# Patient Record
Sex: FEMALE | Race: WHITE | NOT HISPANIC OR LATINO
[De-identification: names, ages, dates, MRNs, and addresses within clinical notes are randomized per-mention and may not be internally consistent; named-entity substitution may affect disease eponyms.]

---

## 2017-01-31 ENCOUNTER — APPOINTMENT (OUTPATIENT)
Dept: CT IMAGING | Facility: CLINIC | Age: 72
End: 2017-01-31

## 2017-01-31 ENCOUNTER — APPOINTMENT (OUTPATIENT)
Dept: RADIOLOGY | Facility: CLINIC | Age: 72
End: 2017-01-31

## 2017-01-31 ENCOUNTER — OUTPATIENT (OUTPATIENT)
Dept: OUTPATIENT SERVICES | Facility: HOSPITAL | Age: 72
LOS: 1 days | End: 2017-01-31
Payer: MEDICARE

## 2017-01-31 DIAGNOSIS — R63.4 ABNORMAL WEIGHT LOSS: ICD-10-CM

## 2017-01-31 DIAGNOSIS — Z00.8 ENCOUNTER FOR OTHER GENERAL EXAMINATION: ICD-10-CM

## 2017-01-31 DIAGNOSIS — M54.42 LUMBAGO WITH SCIATICA, LEFT SIDE: ICD-10-CM

## 2017-01-31 PROBLEM — Z00.00 ENCOUNTER FOR PREVENTIVE HEALTH EXAMINATION: Status: ACTIVE | Noted: 2017-01-31

## 2017-01-31 PROCEDURE — 72110 X-RAY EXAM L-2 SPINE 4/>VWS: CPT

## 2017-01-31 PROCEDURE — 72170 X-RAY EXAM OF PELVIS: CPT

## 2017-01-31 PROCEDURE — 74176 CT ABD & PELVIS W/O CONTRAST: CPT

## 2018-05-20 ENCOUNTER — INPATIENT (INPATIENT)
Facility: HOSPITAL | Age: 73
LOS: 19 days | DRG: 871 | End: 2018-06-09
Attending: INTERNAL MEDICINE | Admitting: HOSPITALIST
Payer: MEDICARE

## 2018-05-20 VITALS
RESPIRATION RATE: 18 BRPM | SYSTOLIC BLOOD PRESSURE: 69 MMHG | HEART RATE: 144 BPM | DIASTOLIC BLOOD PRESSURE: 56 MMHG | OXYGEN SATURATION: 96 %

## 2018-05-20 LAB
ALBUMIN SERPL ELPH-MCNC: 1.5 G/DL — LOW (ref 3.3–5)
ALP SERPL-CCNC: 82 U/L — SIGNIFICANT CHANGE UP (ref 40–120)
ALT FLD-CCNC: 7 U/L — LOW (ref 10–45)
ANION GAP SERPL CALC-SCNC: 8 MMOL/L — SIGNIFICANT CHANGE UP (ref 5–17)
APTT BLD: 37.1 SEC — SIGNIFICANT CHANGE UP (ref 27.5–37.4)
AST SERPL-CCNC: 13 U/L — SIGNIFICANT CHANGE UP (ref 10–40)
BASOPHILS # BLD AUTO: 0 K/UL — SIGNIFICANT CHANGE UP (ref 0–0.2)
BASOPHILS NFR BLD AUTO: 0 % — SIGNIFICANT CHANGE UP (ref 0–2)
BILIRUB SERPL-MCNC: 0.3 MG/DL — SIGNIFICANT CHANGE UP (ref 0.2–1.2)
BLD GP AB SCN SERPL QL: NEGATIVE — SIGNIFICANT CHANGE UP
BUN SERPL-MCNC: 28 MG/DL — HIGH (ref 7–23)
CALCIUM SERPL-MCNC: 7.2 MG/DL — LOW (ref 8.4–10.5)
CHLORIDE SERPL-SCNC: 103 MMOL/L — SIGNIFICANT CHANGE UP (ref 96–108)
CO2 SERPL-SCNC: 25 MMOL/L — SIGNIFICANT CHANGE UP (ref 22–31)
CREAT SERPL-MCNC: 0.71 MG/DL — SIGNIFICANT CHANGE UP (ref 0.5–1.3)
EOSINOPHIL # BLD AUTO: 0.1 K/UL — SIGNIFICANT CHANGE UP (ref 0–0.5)
EOSINOPHIL NFR BLD AUTO: 0 % — SIGNIFICANT CHANGE UP (ref 0–6)
GAS PNL BLDV: SIGNIFICANT CHANGE UP
GLUCOSE SERPL-MCNC: 102 MG/DL — HIGH (ref 70–99)
HCT VFR BLD CALC: 20.7 % — CRITICAL LOW (ref 34.5–45)
HGB BLD-MCNC: 6.5 G/DL — CRITICAL LOW (ref 11.5–15.5)
INR BLD: 1.45 RATIO — HIGH (ref 0.88–1.16)
LYMPHOCYTES # BLD AUTO: 2.5 K/UL — SIGNIFICANT CHANGE UP (ref 1–3.3)
LYMPHOCYTES # BLD AUTO: 6 % — LOW (ref 13–44)
MCHC RBC-ENTMCNC: 28.9 PG — SIGNIFICANT CHANGE UP (ref 27–34)
MCHC RBC-ENTMCNC: 31.3 GM/DL — LOW (ref 32–36)
MCV RBC AUTO: 92.4 FL — SIGNIFICANT CHANGE UP (ref 80–100)
MONOCYTES # BLD AUTO: 1.2 K/UL — HIGH (ref 0–0.9)
MONOCYTES NFR BLD AUTO: 3 % — SIGNIFICANT CHANGE UP (ref 2–14)
NEUTROPHILS # BLD AUTO: 24.1 K/UL — HIGH (ref 1.8–7.4)
NEUTROPHILS NFR BLD AUTO: 89 % — HIGH (ref 43–77)
PLATELET # BLD AUTO: 363 K/UL — SIGNIFICANT CHANGE UP (ref 150–400)
POTASSIUM SERPL-MCNC: 4.5 MMOL/L — SIGNIFICANT CHANGE UP (ref 3.5–5.3)
POTASSIUM SERPL-SCNC: 4.5 MMOL/L — SIGNIFICANT CHANGE UP (ref 3.5–5.3)
PROT SERPL-MCNC: 5 G/DL — LOW (ref 6–8.3)
PROTHROM AB SERPL-ACNC: 15.9 SEC — HIGH (ref 9.8–12.7)
RBC # BLD: 2.24 M/UL — LOW (ref 3.8–5.2)
RBC # FLD: 21.7 % — HIGH (ref 10.3–14.5)
RH IG SCN BLD-IMP: POSITIVE — SIGNIFICANT CHANGE UP
RH IG SCN BLD-IMP: POSITIVE — SIGNIFICANT CHANGE UP
SODIUM SERPL-SCNC: 136 MMOL/L — SIGNIFICANT CHANGE UP (ref 135–145)
WBC # BLD: 27.9 K/UL — HIGH (ref 3.8–10.5)
WBC # FLD AUTO: 27.9 K/UL — HIGH (ref 3.8–10.5)

## 2018-05-20 PROCEDURE — 99223 1ST HOSP IP/OBS HIGH 75: CPT | Mod: AI,GC

## 2018-05-20 PROCEDURE — 74177 CT ABD & PELVIS W/CONTRAST: CPT | Mod: 26

## 2018-05-20 PROCEDURE — 71260 CT THORAX DX C+: CPT | Mod: 26

## 2018-05-20 PROCEDURE — 71045 X-RAY EXAM CHEST 1 VIEW: CPT | Mod: 26

## 2018-05-20 PROCEDURE — 99291 CRITICAL CARE FIRST HOUR: CPT | Mod: GC

## 2018-05-20 RX ORDER — SODIUM CHLORIDE 9 MG/ML
1000 INJECTION, SOLUTION INTRAVENOUS ONCE
Qty: 0 | Refills: 0 | Status: COMPLETED | OUTPATIENT
Start: 2018-05-20 | End: 2018-05-20

## 2018-05-20 RX ORDER — PIPERACILLIN AND TAZOBACTAM 4; .5 G/20ML; G/20ML
3.38 INJECTION, POWDER, LYOPHILIZED, FOR SOLUTION INTRAVENOUS ONCE
Qty: 0 | Refills: 0 | Status: COMPLETED | OUTPATIENT
Start: 2018-05-20 | End: 2018-05-21

## 2018-05-20 RX ORDER — OXYCODONE HYDROCHLORIDE 5 MG/1
5 TABLET ORAL ONCE
Qty: 0 | Refills: 0 | Status: DISCONTINUED | OUTPATIENT
Start: 2018-05-20 | End: 2018-05-20

## 2018-05-20 RX ORDER — SODIUM CHLORIDE 9 MG/ML
1000 INJECTION INTRAMUSCULAR; INTRAVENOUS; SUBCUTANEOUS ONCE
Qty: 0 | Refills: 0 | Status: DISCONTINUED | OUTPATIENT
Start: 2018-05-20 | End: 2018-05-20

## 2018-05-20 RX ORDER — VANCOMYCIN HCL 1 G
1000 VIAL (EA) INTRAVENOUS ONCE
Qty: 0 | Refills: 0 | Status: COMPLETED | OUTPATIENT
Start: 2018-05-20 | End: 2018-05-21

## 2018-05-20 RX ADMIN — OXYCODONE HYDROCHLORIDE 5 MILLIGRAM(S): 5 TABLET ORAL at 18:09

## 2018-05-20 RX ADMIN — SODIUM CHLORIDE 1000 MILLILITER(S): 9 INJECTION, SOLUTION INTRAVENOUS at 17:01

## 2018-05-20 RX ADMIN — OXYCODONE HYDROCHLORIDE 5 MILLIGRAM(S): 5 TABLET ORAL at 20:26

## 2018-05-20 NOTE — ED PROVIDER NOTE - MEDICAL DECISION MAKING DETAILS
MING Butts MD: 74 y/o female with metastatic sarcoma, AFib not on AC is BIBA for generalized weakness, abd pain, found to be hypotensive and tachycardic. Low H/H on recent labs. Plan: IV hydration, infectious and metabolic w/u, transfuse if H/H low, likely admission for further w/u

## 2018-05-20 NOTE — ED PROVIDER NOTE - CRITICAL CARE PROVIDED
direct patient care (not related to procedure)/documentation/telephone consultation with the patient's family/additional history taking/interpretation of diagnostic studies/consult w/ pt's family directly relating to pts condition

## 2018-05-20 NOTE — ED PROVIDER NOTE - OBJECTIVE STATEMENT
Pt is a 74 y/o female with PMH AFib (not on AC), metastatic sarcoma who is BIBA for weakness and abd pain. Per patient, she has chronic abd pain for which she takes oxycodone, but did not take any today. She has had reduced PO intake 2/2 pain everytime she eats and diarrhea. Per son (physician), pt was on chemo until 2 yrs ago when it was d/c-ed because it wasn't helping. Pt receives all her care at [facility]. Her Oncologist is Dr. Argelia Smith. Her Hgb was <8 on labs done last week but no blood was given at that time. She has had off/on BRBPR, which was thought to be 2/2 "gastritis," per son she had an EGD 1-2 months ago. Per EMS, SBP was 60's systolic on arrival and -140's. Per son, she has been holding her lopressor (for AFib) when her BP is low. Last BRBPR was 1 week ago per patient. Denies f/c, n/v/d, HA, dizziness, focal numbness/weakness.  Son: Reymundo Rivera (hospitalist at [facility]) [phone] Pt is a 72 y/o female with PMH AFib (not on AC), metastatic sarcoma who is BIBA for weakness and abd pain. Per patient, she has chronic abd pain for which she takes oxycodone, but did not take any today. She has had reduced PO intake 2/2 pain everytime she eats and diarrhea. Per son (physician), pt was on chemo until 2 yrs ago when it was d/c-ed because it wasn't helping. Pt receives all her care at [facility]. Her Oncologist is Dr. Argelia Smith. Her Hgb was <8 on labs done last week but no blood was given at that time. She has had off/on BRBPR, which was thought to be 2/2 "gastritis," per son she had an EGD 1-2 months ago. Per EMS, SBP was 60's systolic on arrival and -140's. Per son, she has been holding her lopressor (for AFib) when her BP is low. Last BRBPR was 1 week ago per patient. Denies f/c, n/v/d, HA, dizziness, focal numbness/weakness.  Son: Reymundo Rivera (hospitalist at [facility]) [phone] Pt is a 72 y/o female with PMH AFib (not on AC), metastatic sarcoma who is BIBA for weakness and abd pain. Per patient, she has chronic abd pain for which she takes oxycodone, but did not take any today. She has had reduced PO intake 2/2 pain everytime she eats and diarrhea. Per son (physician), pt was on chemo until 2 yrs ago when it was d/c-ed because it wasn't helping. Pt receives all her care at [facility]. Her Oncologist is Dr. Argelia Smith. Her Hgb was <8 on labs done last week but no blood was given at that time. She has had off/on BRBPR, which was thought to be 2/2 "gastritis," per son she had an EGD 1-2 months ago. Per EMS, SBP was 60's systolic on arrival and -140's. Per son, she has been holding her lopressor (for AFib) when her BP is low. Last BRBPR was 1 week ago per patient. Denies f/c, n/v/d, HA, dizziness, focal numbness/weakness.     Add' hx: Pt had a pericardial effusion drained a month ago. 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 Last surgery was 1 yr ago (resected her cancer), but it locally recurred. S/p gemcytobine/doxitaxel c/b pneumonitis, followed by liposomal doxirubicin. Later transitioned to a clinical trial immunotherapy drug of zivolumab + another new drug, d/c-ed due to side effects (thyroiditis>hypothyroidism). Had a few recent admissions for GIB (melena from duodenal lesion which improved s/p radiation) while at [facility] - xarelto discontinued. H/o EGD but no colonoscopy done.  Per son, she is usually on short acting metoprolol when BP allows if her BP is on the low-side for her AFib.   Code status: full code for now; pt has a living will however    Son: Reymundo Rivera (hospitalist at [facility]) [phone]. Please call if add'l questions or discussions re: her care

## 2018-05-20 NOTE — ED ADULT NURSE REASSESSMENT NOTE - NS ED NURSE REASSESS COMMENT FT1
#1 PRBC started as per order with 2RNs verifying the pt and blood product, consent in chart, pt is educated for signs and symptoms of adverse reaction.

## 2018-05-20 NOTE — ED ADULT NURSE NOTE - OBJECTIVE STATEMENT
72 yo F arrived to the ed by ambulance hx of stomach ca c/o rectal bleeding/hypotension; pt c/o abd pain; c.m placed, tachycardic; A&Ox4; IV placed 74 yo F arrived to the ed by ambulance hx of stomach ca c/o rectal bleeding/hypotension; pt stopped chemo 2 years ago; reports decreased po intake r/t pain when eating; as per some pt has had intermittent gi bleeding, today c/o weakness, denies any pain on assessment; c.m placed, tachycardic; A&Ox4; IV placed

## 2018-05-20 NOTE — ED PROVIDER NOTE - CARE PLAN
Principal Discharge DX:	Hypotension, unspecified hypotension type  Secondary Diagnosis:	Anemia, unspecified type

## 2018-05-21 DIAGNOSIS — A41.9 SEPSIS, UNSPECIFIED ORGANISM: ICD-10-CM

## 2018-05-21 DIAGNOSIS — E03.9 HYPOTHYROIDISM, UNSPECIFIED: ICD-10-CM

## 2018-05-21 DIAGNOSIS — I95.9 HYPOTENSION, UNSPECIFIED: ICD-10-CM

## 2018-05-21 DIAGNOSIS — Z90.411 ACQUIRED PARTIAL ABSENCE OF PANCREAS: Chronic | ICD-10-CM

## 2018-05-21 DIAGNOSIS — Z90.5 ACQUIRED ABSENCE OF KIDNEY: Chronic | ICD-10-CM

## 2018-05-21 DIAGNOSIS — C49.9 MALIGNANT NEOPLASM OF CONNECTIVE AND SOFT TISSUE, UNSPECIFIED: ICD-10-CM

## 2018-05-21 DIAGNOSIS — Z71.89 OTHER SPECIFIED COUNSELING: ICD-10-CM

## 2018-05-21 DIAGNOSIS — Z29.9 ENCOUNTER FOR PROPHYLACTIC MEASURES, UNSPECIFIED: ICD-10-CM

## 2018-05-21 DIAGNOSIS — I48.91 UNSPECIFIED ATRIAL FIBRILLATION: ICD-10-CM

## 2018-05-21 DIAGNOSIS — D62 ACUTE POSTHEMORRHAGIC ANEMIA: ICD-10-CM

## 2018-05-21 LAB
ALBUMIN SERPL ELPH-MCNC: 1.5 G/DL — LOW (ref 3.3–5)
ALBUMIN SERPL ELPH-MCNC: 1.7 G/DL — LOW (ref 3.3–5)
ALP SERPL-CCNC: 102 U/L — SIGNIFICANT CHANGE UP (ref 40–120)
ALP SERPL-CCNC: 87 U/L — SIGNIFICANT CHANGE UP (ref 40–120)
ALT FLD-CCNC: 5 U/L — LOW (ref 10–45)
ALT FLD-CCNC: <5 U/L — LOW (ref 10–45)
ANION GAP SERPL CALC-SCNC: 10 MMOL/L — SIGNIFICANT CHANGE UP (ref 5–17)
ANION GAP SERPL CALC-SCNC: 11 MMOL/L — SIGNIFICANT CHANGE UP (ref 5–17)
AST SERPL-CCNC: 12 U/L — SIGNIFICANT CHANGE UP (ref 10–40)
AST SERPL-CCNC: 6 U/L — LOW (ref 10–40)
BILIRUB SERPL-MCNC: 0.7 MG/DL — SIGNIFICANT CHANGE UP (ref 0.2–1.2)
BILIRUB SERPL-MCNC: 0.8 MG/DL — SIGNIFICANT CHANGE UP (ref 0.2–1.2)
BUN SERPL-MCNC: 26 MG/DL — HIGH (ref 7–23)
BUN SERPL-MCNC: 26 MG/DL — HIGH (ref 7–23)
CALCIUM SERPL-MCNC: 7.1 MG/DL — LOW (ref 8.4–10.5)
CALCIUM SERPL-MCNC: 7.7 MG/DL — LOW (ref 8.4–10.5)
CHLORIDE SERPL-SCNC: 102 MMOL/L — SIGNIFICANT CHANGE UP (ref 96–108)
CHLORIDE SERPL-SCNC: 104 MMOL/L — SIGNIFICANT CHANGE UP (ref 96–108)
CO2 SERPL-SCNC: 22 MMOL/L — SIGNIFICANT CHANGE UP (ref 22–31)
CO2 SERPL-SCNC: 25 MMOL/L — SIGNIFICANT CHANGE UP (ref 22–31)
CREAT SERPL-MCNC: 0.73 MG/DL — SIGNIFICANT CHANGE UP (ref 0.5–1.3)
CREAT SERPL-MCNC: 0.75 MG/DL — SIGNIFICANT CHANGE UP (ref 0.5–1.3)
FOLATE SERPL-MCNC: 7.4 NG/ML — SIGNIFICANT CHANGE UP
GAS PNL BLDV: SIGNIFICANT CHANGE UP
GLUCOSE BLDC GLUCOMTR-MCNC: 75 MG/DL — SIGNIFICANT CHANGE UP (ref 70–99)
GLUCOSE SERPL-MCNC: 77 MG/DL — SIGNIFICANT CHANGE UP (ref 70–99)
GLUCOSE SERPL-MCNC: 86 MG/DL — SIGNIFICANT CHANGE UP (ref 70–99)
HCT VFR BLD CALC: 30.7 % — LOW (ref 34.5–45)
HCT VFR BLD CALC: 34.8 % — SIGNIFICANT CHANGE UP (ref 34.5–45)
HCT VFR BLD CALC: 38.2 % — SIGNIFICANT CHANGE UP (ref 34.5–45)
HGB BLD-MCNC: 10 G/DL — LOW (ref 11.5–15.5)
HGB BLD-MCNC: 11.3 G/DL — LOW (ref 11.5–15.5)
HGB BLD-MCNC: 12.2 G/DL — SIGNIFICANT CHANGE UP (ref 11.5–15.5)
MAGNESIUM SERPL-MCNC: 1.9 MG/DL — SIGNIFICANT CHANGE UP (ref 1.6–2.6)
MCHC RBC-ENTMCNC: 29.3 PG — SIGNIFICANT CHANGE UP (ref 27–34)
MCHC RBC-ENTMCNC: 29.6 PG — SIGNIFICANT CHANGE UP (ref 27–34)
MCHC RBC-ENTMCNC: 29.8 PG — SIGNIFICANT CHANGE UP (ref 27–34)
MCHC RBC-ENTMCNC: 31.8 GM/DL — LOW (ref 32–36)
MCHC RBC-ENTMCNC: 32.5 GM/DL — SIGNIFICANT CHANGE UP (ref 32–36)
MCHC RBC-ENTMCNC: 32.6 GM/DL — SIGNIFICANT CHANGE UP (ref 32–36)
MCV RBC AUTO: 91 FL — SIGNIFICANT CHANGE UP (ref 80–100)
MCV RBC AUTO: 91.6 FL — SIGNIFICANT CHANGE UP (ref 80–100)
MCV RBC AUTO: 92.1 FL — SIGNIFICANT CHANGE UP (ref 80–100)
PHOSPHATE SERPL-MCNC: 2.9 MG/DL — SIGNIFICANT CHANGE UP (ref 2.5–4.5)
PLATELET # BLD AUTO: 293 K/UL — SIGNIFICANT CHANGE UP (ref 150–400)
PLATELET # BLD AUTO: 303 K/UL — SIGNIFICANT CHANGE UP (ref 150–400)
PLATELET # BLD AUTO: 360 K/UL — SIGNIFICANT CHANGE UP (ref 150–400)
POTASSIUM SERPL-MCNC: 4.5 MMOL/L — SIGNIFICANT CHANGE UP (ref 3.5–5.3)
POTASSIUM SERPL-MCNC: 4.5 MMOL/L — SIGNIFICANT CHANGE UP (ref 3.5–5.3)
POTASSIUM SERPL-SCNC: 4.5 MMOL/L — SIGNIFICANT CHANGE UP (ref 3.5–5.3)
POTASSIUM SERPL-SCNC: 4.5 MMOL/L — SIGNIFICANT CHANGE UP (ref 3.5–5.3)
PROT SERPL-MCNC: 5.1 G/DL — LOW (ref 6–8.3)
PROT SERPL-MCNC: 5.4 G/DL — LOW (ref 6–8.3)
RBC # BLD: 3.37 M/UL — LOW (ref 3.8–5.2)
RBC # BLD: 3.8 M/UL — SIGNIFICANT CHANGE UP (ref 3.8–5.2)
RBC # BLD: 4.15 M/UL — SIGNIFICANT CHANGE UP (ref 3.8–5.2)
RBC # FLD: 18 % — HIGH (ref 10.3–14.5)
RBC # FLD: 18.7 % — HIGH (ref 10.3–14.5)
RBC # FLD: 18.9 % — HIGH (ref 10.3–14.5)
SODIUM SERPL-SCNC: 137 MMOL/L — SIGNIFICANT CHANGE UP (ref 135–145)
SODIUM SERPL-SCNC: 137 MMOL/L — SIGNIFICANT CHANGE UP (ref 135–145)
T3 SERPL-MCNC: 37 NG/DL — LOW (ref 80–200)
T4 FREE SERPL-MCNC: 0.4 NG/DL — LOW (ref 0.9–1.8)
TSH SERPL-MCNC: 31.87 UIU/ML — HIGH (ref 0.27–4.2)
VIT B12 SERPL-MCNC: 1692 PG/ML — HIGH (ref 232–1245)
WBC # BLD: 22.4 K/UL — HIGH (ref 3.8–10.5)
WBC # BLD: 25.3 K/UL — HIGH (ref 3.8–10.5)
WBC # BLD: 26.9 K/UL — HIGH (ref 3.8–10.5)
WBC # FLD AUTO: 22.4 K/UL — HIGH (ref 3.8–10.5)
WBC # FLD AUTO: 25.3 K/UL — HIGH (ref 3.8–10.5)
WBC # FLD AUTO: 26.9 K/UL — HIGH (ref 3.8–10.5)

## 2018-05-21 PROCEDURE — 99233 SBSQ HOSP IP/OBS HIGH 50: CPT | Mod: GC

## 2018-05-21 PROCEDURE — 99222 1ST HOSP IP/OBS MODERATE 55: CPT

## 2018-05-21 RX ORDER — OXYCODONE HYDROCHLORIDE 5 MG/1
5 TABLET ORAL EVERY 6 HOURS
Qty: 0 | Refills: 0 | Status: DISCONTINUED | OUTPATIENT
Start: 2018-05-21 | End: 2018-05-21

## 2018-05-21 RX ORDER — OXYCODONE HYDROCHLORIDE 5 MG/1
5 TABLET ORAL EVERY 6 HOURS
Qty: 0 | Refills: 0 | Status: DISCONTINUED | OUTPATIENT
Start: 2018-05-21 | End: 2018-05-22

## 2018-05-21 RX ORDER — SODIUM CHLORIDE 9 MG/ML
500 INJECTION INTRAMUSCULAR; INTRAVENOUS; SUBCUTANEOUS ONCE
Qty: 0 | Refills: 0 | Status: COMPLETED | OUTPATIENT
Start: 2018-05-21 | End: 2018-05-21

## 2018-05-21 RX ORDER — DIGOXIN 250 MCG
0.25 TABLET ORAL EVERY 8 HOURS
Qty: 0 | Refills: 0 | Status: COMPLETED | OUTPATIENT
Start: 2018-05-21 | End: 2018-05-22

## 2018-05-21 RX ORDER — METOPROLOL TARTRATE 50 MG
25 TABLET ORAL
Qty: 0 | Refills: 0 | Status: DISCONTINUED | OUTPATIENT
Start: 2018-05-21 | End: 2018-06-03

## 2018-05-21 RX ORDER — SODIUM CHLORIDE 9 MG/ML
1000 INJECTION INTRAMUSCULAR; INTRAVENOUS; SUBCUTANEOUS
Qty: 0 | Refills: 0 | Status: COMPLETED | OUTPATIENT
Start: 2018-05-21 | End: 2018-05-22

## 2018-05-21 RX ORDER — SODIUM CHLORIDE 9 MG/ML
1000 INJECTION INTRAMUSCULAR; INTRAVENOUS; SUBCUTANEOUS ONCE
Qty: 0 | Refills: 0 | Status: DISCONTINUED | OUTPATIENT
Start: 2018-05-21 | End: 2018-05-21

## 2018-05-21 RX ORDER — SODIUM CHLORIDE 9 MG/ML
1000 INJECTION INTRAMUSCULAR; INTRAVENOUS; SUBCUTANEOUS ONCE
Qty: 0 | Refills: 0 | Status: COMPLETED | OUTPATIENT
Start: 2018-05-21 | End: 2018-05-21

## 2018-05-21 RX ORDER — LEVOTHYROXINE SODIUM 125 MCG
75 TABLET ORAL DAILY
Qty: 0 | Refills: 0 | Status: DISCONTINUED | OUTPATIENT
Start: 2018-05-21 | End: 2018-05-25

## 2018-05-21 RX ORDER — MIRTAZAPINE 45 MG/1
7.5 TABLET, ORALLY DISINTEGRATING ORAL DAILY
Qty: 0 | Refills: 0 | Status: DISCONTINUED | OUTPATIENT
Start: 2018-05-21 | End: 2018-06-06

## 2018-05-21 RX ORDER — ACETAMINOPHEN 500 MG
1000 TABLET ORAL ONCE
Qty: 0 | Refills: 0 | Status: DISCONTINUED | OUTPATIENT
Start: 2018-05-21 | End: 2018-05-21

## 2018-05-21 RX ORDER — VANCOMYCIN HCL 1 G
1000 VIAL (EA) INTRAVENOUS EVERY 12 HOURS
Qty: 0 | Refills: 0 | Status: DISCONTINUED | OUTPATIENT
Start: 2018-05-21 | End: 2018-05-22

## 2018-05-21 RX ORDER — SODIUM CHLORIDE 9 MG/ML
1000 INJECTION INTRAMUSCULAR; INTRAVENOUS; SUBCUTANEOUS
Qty: 0 | Refills: 0 | Status: DISCONTINUED | OUTPATIENT
Start: 2018-05-21 | End: 2018-05-21

## 2018-05-21 RX ORDER — DIGOXIN 250 MCG
0.5 TABLET ORAL ONCE
Qty: 0 | Refills: 0 | Status: COMPLETED | OUTPATIENT
Start: 2018-05-21 | End: 2018-05-21

## 2018-05-21 RX ORDER — PANTOPRAZOLE SODIUM 20 MG/1
40 TABLET, DELAYED RELEASE ORAL EVERY 12 HOURS
Qty: 0 | Refills: 0 | Status: DISCONTINUED | OUTPATIENT
Start: 2018-05-21 | End: 2018-05-24

## 2018-05-21 RX ORDER — ATORVASTATIN CALCIUM 80 MG/1
40 TABLET, FILM COATED ORAL AT BEDTIME
Qty: 0 | Refills: 0 | Status: DISCONTINUED | OUTPATIENT
Start: 2018-05-21 | End: 2018-06-05

## 2018-05-21 RX ORDER — PIPERACILLIN AND TAZOBACTAM 4; .5 G/20ML; G/20ML
3.38 INJECTION, POWDER, LYOPHILIZED, FOR SOLUTION INTRAVENOUS EVERY 8 HOURS
Qty: 0 | Refills: 0 | Status: DISCONTINUED | OUTPATIENT
Start: 2018-05-21 | End: 2018-05-25

## 2018-05-21 RX ORDER — HYDROMORPHONE HYDROCHLORIDE 2 MG/ML
0.5 INJECTION INTRAMUSCULAR; INTRAVENOUS; SUBCUTANEOUS ONCE
Qty: 0 | Refills: 0 | Status: DISCONTINUED | OUTPATIENT
Start: 2018-05-21 | End: 2018-05-21

## 2018-05-21 RX ORDER — HYDROMORPHONE HYDROCHLORIDE 2 MG/ML
0.5 INJECTION INTRAMUSCULAR; INTRAVENOUS; SUBCUTANEOUS EVERY 4 HOURS
Qty: 0 | Refills: 0 | Status: DISCONTINUED | OUTPATIENT
Start: 2018-05-21 | End: 2018-05-22

## 2018-05-21 RX ADMIN — HYDROMORPHONE HYDROCHLORIDE 0.5 MILLIGRAM(S): 2 INJECTION INTRAMUSCULAR; INTRAVENOUS; SUBCUTANEOUS at 12:56

## 2018-05-21 RX ADMIN — HYDROMORPHONE HYDROCHLORIDE 0.5 MILLIGRAM(S): 2 INJECTION INTRAMUSCULAR; INTRAVENOUS; SUBCUTANEOUS at 13:15

## 2018-05-21 RX ADMIN — PANTOPRAZOLE SODIUM 40 MILLIGRAM(S): 20 TABLET, DELAYED RELEASE ORAL at 18:11

## 2018-05-21 RX ADMIN — OXYCODONE HYDROCHLORIDE 5 MILLIGRAM(S): 5 TABLET ORAL at 21:33

## 2018-05-21 RX ADMIN — OXYCODONE HYDROCHLORIDE 5 MILLIGRAM(S): 5 TABLET ORAL at 20:33

## 2018-05-21 RX ADMIN — ATORVASTATIN CALCIUM 40 MILLIGRAM(S): 80 TABLET, FILM COATED ORAL at 20:33

## 2018-05-21 RX ADMIN — Medication 0.25 MILLIGRAM(S): at 20:21

## 2018-05-21 RX ADMIN — Medication 250 MILLIGRAM(S): at 10:14

## 2018-05-21 RX ADMIN — Medication 0.5 MILLIGRAM(S): at 11:08

## 2018-05-21 RX ADMIN — MIRTAZAPINE 7.5 MILLIGRAM(S): 45 TABLET, ORALLY DISINTEGRATING ORAL at 20:33

## 2018-05-21 RX ADMIN — PIPERACILLIN AND TAZOBACTAM 25 GRAM(S): 4; .5 INJECTION, POWDER, LYOPHILIZED, FOR SOLUTION INTRAVENOUS at 09:02

## 2018-05-21 RX ADMIN — SODIUM CHLORIDE 75 MILLILITER(S): 9 INJECTION INTRAMUSCULAR; INTRAVENOUS; SUBCUTANEOUS at 04:38

## 2018-05-21 RX ADMIN — PANTOPRAZOLE SODIUM 40 MILLIGRAM(S): 20 TABLET, DELAYED RELEASE ORAL at 06:55

## 2018-05-21 RX ADMIN — PIPERACILLIN AND TAZOBACTAM 25 GRAM(S): 4; .5 INJECTION, POWDER, LYOPHILIZED, FOR SOLUTION INTRAVENOUS at 18:11

## 2018-05-21 RX ADMIN — SODIUM CHLORIDE 4000 MILLILITER(S): 9 INJECTION INTRAMUSCULAR; INTRAVENOUS; SUBCUTANEOUS at 11:07

## 2018-05-21 RX ADMIN — Medication 250 MILLIGRAM(S): at 04:34

## 2018-05-21 RX ADMIN — Medication 250 MILLIGRAM(S): at 22:57

## 2018-05-21 RX ADMIN — PIPERACILLIN AND TAZOBACTAM 200 GRAM(S): 4; .5 INJECTION, POWDER, LYOPHILIZED, FOR SOLUTION INTRAVENOUS at 00:53

## 2018-05-21 NOTE — CONSULT NOTE ADULT - ATTENDING COMMENTS
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  Patient with multiple admissions for anemia requiring transfusions over the past weeks.  Patient had previous response to XRT to duodenal lesion after bleeding  All prior care performed at [facility].  Currently no bleeding for 24-48 hours.  Patient responded to 2 units prbc.  Extensive discussion with son (Hospitalist at [facility]) regarding potential of endoscopy for diagnostic purposes, but unclear benefit for intervention (suspect any GI intervention to eroding tumor may not be of benefit).      - Will defer on endoscopic evaluation at this time, re evaluate if rebleeding occurs  - IV PPI twice daily (may be of some benefit for duodenal lesion)  - CBC q12 - transfusion as per primary team  - Liquid diet

## 2018-05-21 NOTE — PROGRESS NOTE ADULT - PROBLEM SELECTOR PLAN 6
Per son, patient takes Metoprolol 50 ER BID, which is an abnormal dose. Will hold MEtoprolol for now in setting of hypotension.  Admit to telemetry.  Check TSH. Per son, patient takes Metoprolol 50 ER BID, which is an abnormal dose. Will hold Metoprolol for now in setting of hypotension.  Admit to telemetry.  Check TSH.

## 2018-05-21 NOTE — PROGRESS NOTE ADULT - PROBLEM SELECTOR PLAN 2
F/u UA, BCx.  LLL PNA on CXR. In setting of hypotension, could be septic shock. Will continue broad spectrum antibiotics.  F/u C. Diff per sons request. Patient not endorsing diarrhea at this time. Leukocytosis 28 on admission, unclear whether reactive 2/2 malignancy vs infectious.   F/u UA, BCx.  LLL PNA on CXR. In setting of hypotension, could be septic shock. Will continue broad spectrum antibiotics.  F/u C. Diff per sons request. Patient not endorsing diarrhea at this time.

## 2018-05-21 NOTE — CONSULT NOTE ADULT - ASSESSMENT
Impression:    1. Anemia/recent hematochezia: unclear if acute active bleeding is the cause of hemodynamic instability vs. infectious process. The ddx for bleeding includes due to the upper GI fistula with the duodenum vs. secondary to the lower GI lesion fistulizing with the duodenum, cannot exclude a coexisting other GI lesion such as a peptic ulcer or angioectasia    2. Locally advanced sarcoma    3. Afib with RVR    4. Leukocytosis: no clear infectious source     5. Intermittent diarrhea: possibly due to lesion invading the colon, rule out infectious enterocolitis (cdiff)    Recommendation:  -stabilization/resuscitation per medical team, consider ICU c/s  -would consider EGD/colonoscopy to identify the underlying cause of bleeding and see if the lesion is amenable to endoscopic therapy however the patient's Son Dr. Reymundo Clark would prefer any endoscopic evaluation be performed at [facility] so that care can be coordinated with the patient's oncology team  -it would be helpful to obtain endoscopy records from [facility]  -continue to clarify goals of care  -if diarrhea recurs check Cdiff/GI PCR Impression:    1. Anemia/recent hematochezia: unclear if acute active bleeding is the cause of hemodynamic instability vs. infectious process. The ddx for bleeding includes due to the upper GI fistula with the duodenum vs. secondary to the lower GI lesion fistulizing with the colon, cannot exclude a coexisting other GI lesion such as a peptic ulcer or angioectasia    2. Locally advanced sarcoma    3. Afib with RVR    4. Leukocytosis: no clear infectious source     5. Intermittent diarrhea: possibly due to lesion invading the colon, rule out infectious enterocolitis (cdiff)    Recommendation:  -stabilization/resuscitation per medical team, consider ICU c/s  -would consider EGD/colonoscopy to identify the underlying cause of bleeding and see if the lesion is amenable to endoscopic therapy pending stabilization, however the patient's Son  Reymundo Collazojames would prefer any endoscopic evaluation be performed at [facility] so that care can be coordinated with the patient's oncology team  -it would be helpful to obtain endoscopy records from [facility]  -continue to clarify goals of care  -if diarrhea recurs check Cdiff/GI PCR

## 2018-05-21 NOTE — PROGRESS NOTE ADULT - SUBJECTIVE AND OBJECTIVE BOX
Internal Medicine Progress Note    Claire De Paz, PGY1  Internal Medicine, team 1  Pager [phone] / [phone]  After 7PM on weekdays and 12PM on weekends, please page #[phone]    Patient is a 73y old  Female who presents with a chief complaint of Lethargy, abd pain (21 May 2018 01:47)      SUBJECTIVE / OVERNIGHT EVENTS:    MEDICATIONS  (STANDING):  atorvastatin 40 milliGRAM(s) Oral at bedtime  levothyroxine 75 MICROGram(s) Oral daily  metoprolol tartrate 25 milliGRAM(s) Oral two times a day  mirtazapine 7.5 milliGRAM(s) Oral daily  pantoprazole  Injectable 40 milliGRAM(s) IV Push every 12 hours  piperacillin/tazobactam IVPB. 3.375 Gram(s) IV Intermittent every 8 hours  sodium chloride 0.9%. 1000 milliLiter(s) (75 mL/Hr) IV Continuous <Continuous>  vancomycin  IVPB 1000 milliGRAM(s) IV Intermittent every 12 hours    MEDICATIONS  (PRN):    Vital Signs Last 24 Hrs  T(C): 36.4 (21 May 2018 07:12), Max: 37.6 (21 May 2018 03:28)  T(F): 97.6 (21 May 2018 07:12), Max: 99.7 (21 May 2018 03:28)  HR: 127 (21 May 2018 07:12) (80 - 145)  BP: 98/63 (21 May 2018 07:12) (69/56 - 135/67)  BP(mean): --  RR: 15 (21 May 2018 07:12) (15 - 22)  SpO2: 100% (21 May 2018 07:12) (95% - 100%)    CAPILLARY BLOOD GLUCOSE      POCT Blood Glucose.: 75 mg/dL (21 May 2018 03:13)    I&O's Summary      PHYSICAL EXAM  GENERAL: NAD, well-developed  HEAD:  Atraumatic, Normocephalic  EYES: EOMI, PERRLA, conjunctiva and sclera clear  NECK: Supple, No JVD  CHEST/LUNG: Clear to auscultation bilaterally; No wheeze  HEART: Regular rate and rhythm; No murmurs, rubs, or gallops  ABDOMEN: Soft, Nontender, Nondistended; Bowel sounds present  EXTREMITIES:  2+ Peripheral Pulses, No clubbing, cyanosis, or edema  NEURO/PSYCH: AAOx3, nonfocal  SKIN: No rashes or lesions      LABS:                        10.0   25.3  )-----------( 293      ( 21 May 2018 04:07 )             30.7     CBC Full  -  ( 21 May 2018 04:07 )  WBC Count : 25.3 K/uL  Hemoglobin : 10.0 g/dL  Hematocrit : 30.7 %  Platelet Count - Automated : 293 K/uL  Mean Cell Volume : 91.0 fl  Mean Cell Hemoglobin : 29.6 pg  Mean Cell Hemoglobin Concentration : 32.5 gm/dL  Auto Neutrophil # : x  Auto Lymphocyte # : x  Auto Monocyte # : x  Auto Eosinophil # : x  Auto Basophil # : x  Auto Neutrophil % : x  Auto Lymphocyte % : x  Auto Monocyte % : x  Auto Eosinophil % : x  Auto Basophil % : x    05-21    137  |  104  |  26<H>  ----------------------------<  77  4.5   |  22  |  0.73    Ca    7.1<L>      21 May 2018 04:07  Phos  2.9     05-21  Mg     1.9     05-21    TPro  5.1<L>  /  Alb  1.5<L>  /  TBili  0.7  /  DBili  x   /  AST  12  /  ALT  5<L>  /  AlkPhos  87  05-21    Creatinine Trend: 0.73<--, 0.75<--, 0.71<--  LIVER FUNCTIONS - ( 21 May 2018 04:07 )  Alb: 1.5 g/dL / Pro: 5.1 g/dL / ALK PHOS: 87 U/L / ALT: 5 U/L / AST: 12 U/L / GGT: x           PT/INR - ( 20 May 2018 17:18 )   PT: 15.9 sec;   INR: 1.45 ratio         PTT - ( 20 May 2018 17:18 )  PTT:37.1 sec  CARDIAC MARKERS ( 20 May 2018 17:18 )  x     / <0.01 ng/mL / x     / x     / x                MICROBIOLOGY:      RADIOLOGY & ADDITIONAL TESTS:     CONSULTS: Internal Medicine Progress Note    Claire De Paz, PGY1  Internal Medicine, team 1  Pager [phone] / [phone]  After 7PM on weekdays and 12PM on weekends, please page #[phone]    Patient is a 73y old  Female who presents with a chief complaint of Lethargy, abd pain (21 May 2018 01:47)      SUBJECTIVE / OVERNIGHT EVENTS: Received 2u pRBC with Hg increase to 11.3 from 6.5. RRT for AF RVR to 150s with hypotension SBP 70s-80s. Received IVF and lopressor 2.5 IV x 2, metoprolol 5mg IV x 2. Improved to AF rate 110s- 120s and BP 90/70. No BM in the ED. Pt denies abd pain, nausea, CP, SOB, lightheadedness. She would like to sleep.    MEDICATIONS  (STANDING):  atorvastatin 40 milliGRAM(s) Oral at bedtime  levothyroxine 75 MICROGram(s) Oral daily  metoprolol tartrate 25 milliGRAM(s) Oral two times a day  mirtazapine 7.5 milliGRAM(s) Oral daily  pantoprazole  Injectable 40 milliGRAM(s) IV Push every 12 hours  piperacillin/tazobactam IVPB. 3.375 Gram(s) IV Intermittent every 8 hours  sodium chloride 0.9%. 1000 milliLiter(s) (75 mL/Hr) IV Continuous <Continuous>  vancomycin  IVPB 1000 milliGRAM(s) IV Intermittent every 12 hours    MEDICATIONS  (PRN):    Vital Signs Last 24 Hrs  T(C): 36.4 (21 May 2018 07:12), Max: 37.6 (21 May 2018 03:28)  T(F): 97.6 (21 May 2018 07:12), Max: 99.7 (21 May 2018 03:28)  HR: 127 (21 May 2018 07:12) (80 - 145)  BP: 98/63 (21 May 2018 07:12) (69/56 - 135/67)  BP(mean): --  RR: 15 (21 May 2018 07:12) (15 - 22)  SpO2: 100% (21 May 2018 07:12) (95% - 100%)    CAPILLARY BLOOD GLUCOSE      POCT Blood Glucose.: 75 mg/dL (21 May 2018 03:13)    I&O's Summary      PHYSICAL EXAM  GENERAL: NAD, well-developed  HEAD:  Atraumatic, Normocephalic  EYES: EOMI, PERRLA, conjunctiva and sclera clear  NECK: Supple, No JVD  CHEST/LUNG: Clear to auscultation bilaterally; No wheeze  HEART: Regular rate and rhythm; No murmurs, rubs, or gallops  ABDOMEN: Soft, Nondistended, mild generalized tenderness, no rebound tenderness; Bowel sounds present. Nontender ~5cm firm region inferior to umbilicus  EXTREMITIES:  2+ Peripheral Pulses, No clubbing, cyanosis, or edema  NEURO/PSYCH: AAOx3, nonfocal  SKIN: No rashes or lesions      LABS:                        10.0   25.3  )-----------( 293      ( 21 May 2018 04:07 )             30.7     CBC Full  -  ( 21 May 2018 04:07 )  WBC Count : 25.3 K/uL  Hemoglobin : 10.0 g/dL  Hematocrit : 30.7 %  Platelet Count - Automated : 293 K/uL  Mean Cell Volume : 91.0 fl  Mean Cell Hemoglobin : 29.6 pg  Mean Cell Hemoglobin Concentration : 32.5 gm/dL  Auto Neutrophil # : x  Auto Lymphocyte # : x  Auto Monocyte # : x  Auto Eosinophil # : x  Auto Basophil # : x  Auto Neutrophil % : x  Auto Lymphocyte % : x  Auto Monocyte % : x  Auto Eosinophil % : x  Auto Basophil % : x    05-21    137  |  104  |  26<H>  ----------------------------<  77  4.5   |  22  |  0.73    Ca    7.1<L>      21 May 2018 04:07  Phos  2.9     05-21  Mg     1.9     05-21    TPro  5.1<L>  /  Alb  1.5<L>  /  TBili  0.7  /  DBili  x   /  AST  12  /  ALT  5<L>  /  AlkPhos  87  05-21    Creatinine Trend: 0.73<--, 0.75<--, 0.71<--  LIVER FUNCTIONS - ( 21 May 2018 04:07 )  Alb: 1.5 g/dL / Pro: 5.1 g/dL / ALK PHOS: 87 U/L / ALT: 5 U/L / AST: 12 U/L / GGT: x           PT/INR - ( 20 May 2018 17:18 )   PT: 15.9 sec;   INR: 1.45 ratio         PTT - ( 20 May 2018 17:18 )  PTT:37.1 sec  CARDIAC MARKERS ( 20 May 2018 17:18 )  x     / <0.01 ng/mL / x     / x     / x                MICROBIOLOGY:  BCx pending    RADIOLOGY & ADDITIONAL TESTS:  < from: CT Abdomen and Pelvis w/ IV Cont (05.20.18 @ 20:03) >  IMPRESSION:     Small to moderate and moderate to large left pleural effusion with   compressive atelectasis. Recommend clinical correlation to assess   underlying pneumonia.    Decreased size of the left renal fossa/retroperitoneal mass since   1/31/2017. Evidence of fistulization between the mass and the adjacent   duodenum and descending colon as described.    Indeterminate hypodense focus in theright hepatic lobe.   Neoplasm/metastasis cannot be excluded. This can be further characterized   on a nonemergent contrast enhanced MRI.    Endometrial thickening. Neoplasm cannot be excluded in a postmenopausal   patient. Recommend clinical correlation and follow-up.    Additional findings as described.  < end of copied text >       CONSULTS: GI

## 2018-05-21 NOTE — H&P ADULT - PROBLEM SELECTOR PLAN 2
F/u UA, BCx.  LLL PNA on CXR. In setting of hypotension, could be septic shock. Will continue broad spectrum antibiotics.  F/u C. Diff per sons request. Patient not endorsing diarrhea at this time.

## 2018-05-21 NOTE — ED ADULT NURSE REASSESSMENT NOTE - NS ED NURSE REASSESS COMMENT FT1
L hand 20G infiltrated, iv removed, hot pack provided, dressing applied, site has mild edema noted; md falk aware

## 2018-05-21 NOTE — H&P ADULT - NSHPPHYSICALEXAM_GEN_ALL_CORE
Vital Signs Last 24 Hrs  T(C): 35.9 (21 May 2018 00:08), Max: 36.7 (20 May 2018 16:50)  T(F): 96.7 (21 May 2018 00:08), Max: 98 (20 May 2018 16:50)  HR: 112 (20 May 2018 23:15) (106 - 144)  BP: 98/45 (20 May 2018 23:15) (69/56 - 98/45)  BP(mean): --  RR: 16 (20 May 2018 23:15) (15 - 22)  SpO2: 98% (20 May 2018 23:15) (96% - 100%)    PHYSICAL EXAM:    GENERAL: Comfortable, no acute distress   HEAD:  Normocephalic, atraumatic  EYES: EOMI, PERRLA  HEENT: Moist mucous membranes  NECK: Supple, No JVD  NERVOUS SYSTEM:  CN 2-12 intact b/l. Alert & Oriented X3, Motor Strength 3/5 B/L upper and lower extremities. Sensation intact B/L  CHEST/LUNG: Clear to auscultation bilaterally  HEART: Regular rate and rhythm, no murmur   ABDOMEN: Soft, generalized TTP, focused in the RUQ, LUQ.  EXTREMITIES:   No clubbing, cyanosis, or edema  MUSCULOSKELETAL: No muscle tenderness, no joint tenderness  SKIN: warm and dry, no rash

## 2018-05-21 NOTE — PROGRESS NOTE ADULT - ATTENDING COMMENTS
pt seen and examined 5/21 with medical team. See H&P from 5/21 for full details.   Spoke with pts oncologist at [facility]-- she is aware of current clinical situation and will be in communication daily. She will put pt up for transfer to [facility] with understanding that a bed may not be available prior to d/c. Continue care here as above resident note.

## 2018-05-21 NOTE — PROGRESS NOTE ADULT - PROBLEM SELECTOR PLAN 8
Discussed with radha's son, Dr. Rivera at length. Patient wishes to be full code, as patient's son is getting  in August. Discussed with patient's son, Dr. Rivera at length. Patient wishes to be full code, as patient's son is getting  in August.

## 2018-05-21 NOTE — PROGRESS NOTE ADULT - ASSESSMENT
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

## 2018-05-21 NOTE — H&P ADULT - PROBLEM SELECTOR PLAN 4
Son unsure if gabet is on levothyroxine 112 or 75 mcg. Pharmacy records indicate 75 mcg more likely. AM team to confirm with pharmacy.  Check AM TSH.

## 2018-05-21 NOTE — H&P ADULT - PROBLEM SELECTOR PLAN 5
AM team to speak to Dr. Tamez about further imaging or plan. Evidence of hepatic mass, possible met?

## 2018-05-21 NOTE — PROGRESS NOTE ADULT - PROBLEM SELECTOR PLAN 3
Low threshold for MICU eval  MAy be secondary to sepsis vs acute hemorrhage. Likely multifactorial: May be secondary to sepsis, acute GIB, AF RVR, decreased PO intake  Low threshold for MICU eval for pressor support

## 2018-05-21 NOTE — H&P ADULT - PROBLEM SELECTOR PLAN 6
Per son, patient takes Metoprolol 50 ER BID, which is an abnormal dose. Will start with metoprolol tartrate 25 BID and uptitrate.  Admit to telemetry.  Check TSH. Per son, patient takes Metoprolol 50 ER BID, which is an abnormal dose. Will hold MEtoprolol for now in setting of hypotension.  Admit to telemetry.  Check TSH.

## 2018-05-21 NOTE — H&P ADULT - HISTORY OF PRESENT ILLNESS
History obtained via chart review and telephone phone call with the patient's son, Dr. Reymundo Rivera ().    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 Per the patient, the abdominal pain is "ok." Per the son, the patient has been complaining of abdominal pain for the last 2 weeks with diarrhea and nausea, and unable to tolerate PO. The patient receives all her care at [facility] in [facility]. Per the son, the patient has had multiple admissions at [facility]. The first admission was 3-4 months ago for SOB in the setting of pleural effusions, which were thought to be from possible pancreatic leak and inflammation. The patient was discharged to rehab, where she developed melena, and was found to have the duodenal mass, and was treated with protonix, radiation and Xarelto was discontinued. The patient's last EGD was 1-2 months ago, but the son states she did not have a colonoscopy at that time. The patient is currently denying and F C NVD CP SOB rash HA or change in neurological function or sensation. The patient is aware she is in the hospital and states that her pain is better. Per son, the patient is at her baseline. The son is investigating how to transfer the patient to [facility].    Vital Signs Last 24 Hrs  T(C): 35.9 (21 May 2018 00:08), Max: 36.7 (20 May 2018 16:50)  T(F): 96.7 (21 May 2018 00:08), Max: 98 (20 May 2018 16:50)  HR: 112 (20 May 2018 23:15) (106 - 144)  BP: 98/45 (20 May 2018 23:15) (69/56 - 98/45)  BP(mean): --  RR: 16 (20 May 2018 23:15) (15 - 22)  SpO2: 98% (20 May 2018 23:15) (96% - 100%)    In the ED: CT CAP performed. Being transfused 2 u pRBC. Vanc, Zosyn x1. 1L LR.

## 2018-05-21 NOTE — PROGRESS NOTE ADULT - PROBLEM SELECTOR PLAN 4
Son unsure if gabet is on levothyroxine 112 or 75 mcg. Pharmacy records indicate 75 mcg more likely. AM team to confirm with pharmacy.  Check AM TSH. Son unsure if patient is on levothyroxine 112 or 75 mcg. Pharmacy records indicate 75 mcg more likely.  -continue synthyroid 75 mcg daily  -TSH significantly elevated 31.9. F/u free T4, T3  -f/u dosing from pharmacy

## 2018-05-21 NOTE — PROGRESS NOTE ADULT - PROBLEM SELECTOR PLAN 7
DVT: No chem 2/2 possible bleed  Diet: NPO ahead of possible intervention by GI.  Dispo PT DVT: No chem 2/2 possible bleed  Diet: NPO ahead of possible intervention by GI.  Dispo: PT radha pending, son would like to transfer to [facility] DVT: No pharmacologic 2/2 possible bleed, SCDs  Diet: NPO ahead of possible intervention by GI.  Dispo: PT eval pending, son would like to transfer to [facility]

## 2018-05-21 NOTE — H&P ADULT - NSHPLABSRESULTS_GEN_ALL_CORE
6.5    27.9  )-----------( 363      ( 20 May 2018 17:17 )             20.7     05-20    136  |  103  |  28<H>  ----------------------------<  102<H>  4.5   |  25  |  0.71    Ca    7.2<L>      20 May 2018 17:18    TPro  5.0<L>  /  Alb  1.5<L>  /  TBili  0.3  /  DBili  x   /  AST  13  /  ALT  7<L>  /  AlkPhos  82  05-20    PT/INR - ( 20 May 2018 17:18 )   PT: 15.9 sec;   INR: 1.45 ratio         PTT - ( 20 May 2018 17:18 )  PTT:37.1 sec    < from: CT Abdomen and Pelvis w/ IV Cont (05.20.18 @ 20:03) >      IMPRESSION:     Small to moderate and moderate to large left pleural effusion with   compressive atelectasis. Recommend clinical correlation to assess   underlying pneumonia.    Decreased size of the left renal fossa/retroperitoneal mass since   1/31/2017. Evidence of fistulization between the mass and the adjacent   duodenum and descending colon as described.    Indeterminate hypodense focus in theright hepatic lobe.   Neoplasm/metastasis cannot be excluded. This can be further characterized   on a nonemergent contrast enhanced MRI.    Endometrial thickening. Neoplasm cannot be excluded in a postmenopausal   patient. Recommend clinical correlation and follow-up.    Additional findings as described.    < end of copied text >

## 2018-05-21 NOTE — H&P ADULT - ASSESSMENT
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

## 2018-05-21 NOTE — CHART NOTE - NSCHARTNOTEFT_GEN_A_CORE
RRT called at 2:59 for Afib with RVR and hypotension.  HPI/Hospital course obtained from H&P obtained immediately prior to RRT.  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 Per the patient, the abdominal pain is "ok." Per the son, the patient has been complaining of abdominal pain for the last 2 weeks with diarrhea and nausea, and unable to tolerate PO. [place] [facility] [place] [facility]. Per the son, the patient has had multiple [place] [facility] The first admission was 3-4 months ago for SOB in the setting of pleural effusions, which were thought to be from possible pancreatic leak and inflammation. The patient was discharged to rehab, where she developed melena, and was found to have the duodenal mass, and was treated with protonix, radiation and Xarelto was discontinued. The patient's last EGD was 1-2 months ago, but the son states she did not have a colonoscopy at that time. The patient is currently denying and F C NVD CP SOB rash HA or change in neurological function or sensation. The patient is aware she is in the hospital and states that her pain is better. Per son, the patient is at her baseline. The son is investigating how to transfer the patient [place] [facility]  In the ED, patient hypotensive to 69/56, improved to 90s/60s s/p 1L LR bolus, patient also tachycardic to 140s on arrival, improved to 90s on IVF. Patient received Vancomycin and Zosyn in the ED.    At 2:59, patient noted with HR 150s, irregular on Tele monitor, BP 70s/50s.  On arrival of RRT, patient at baseline mental status, appropriately responding to questions, albeit lethargic.  Vitals: HR irregular 150s, BP 70s-80s/50s-60s, SP02: 100%, RR: 14.  Given low BP, initial management with IVF, running wide open pursued given that patient mentating, and unwilling to run risk of CVA with possible embolization of clot with cardioversion.  Patient's BP transiently improved to 100s/60s, at which time 2.5 mg IV push of Lopressor was administered, lowering patient's HR to 130s-140s, with BP stable 80s/60s.  BP repeatedly recycled, and observed patient for improvement in HR with metoprolol, however given persistent RVR, patient given an additional 2.5, then 5 mg .  Patient with only one 22 gauge peripheral IV, as such additional IV access was pursued.  Patient received an additional PIV in her left foot, and a 20 gauge EJ for additional access. After total of 10 mg IV Metoprolol, and 1L IVF, patient's BP noted to be stable with MAP low 70s, and HR better controlled 110s-130s, decision made to end rapid, with admitting resident to follow up on patient's CBC, BMP, lactate, and coag panel that were obtained aldo-RRT.     Marisol Hopkins, PGY-3  MAR Spectra: [id number] RRT called at 2:59 for Afib with RVR and hypotension.  HPI/Hospital course obtained from H&P obtained immediately prior to RRT.  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 Per the patient, the abdominal pain is "ok." Per the son, the patient has been complaining of abdominal pain for the last 2 weeks with diarrhea and nausea, and unable to tolerate PO. [place] [facility] [place] [facility]. Per the son, the patient has had multiple admissions [place] [facility] The first admission was 3-4 months ago for SOB in the setting of pleural effusions, which were thought to be from possible pancreatic leak and inflammation. The patient was discharged to rehab, where she developed melena, and was found to have the duodenal mass, and was treated with protonix, radiation and Xarelto was discontinued. The patient's last EGD was 1-2 months ago, but the son states she did not have a colonoscopy at that time. The patient is currently denying and F C NVD CP SOB rash HA or change in neurological function or sensation. The patient is aware she is in the hospital and states that her pain is better. Per son, the patient is at her baseline. The son is investigating how to transfer the patient [place] [facility]  In the ED, patient hypotensive to 69/56, improved to 90s/60s s/p 1L LR bolus, and 2U PRBCs, patient also tachycardic to 140s on arrival, improved to 90s. Patient received Vancomycin and Zosyn in the ED.    At 2:59, patient noted with HR 150s, irregular on Tele monitor, BP 70s/50s.  On arrival of RRT, patient at baseline mental status, appropriately responding to questions, albeit lethargic.  Vitals: HR irregular 150s, BP 70s-80s/50s-60s, SP02: 100%, RR: 14.  Given low BP, initial management with IVF, running wide open pursued given that patient mentating, and RRT unwilling to run risk of CVA with possible embolization of clot with cardioversion.  EKG obtained showing Afib with RVR.  Patient's BP transiently improved to 100s/60s, at which time 2.5 mg IV push of Lopressor was administered, lowering patient's HR to 130s-140s, with BP stable 80s/60s.  BP repeatedly recycled, and observed patient for improvement in HR with metoprolol, however given persistent RVR, patient given an additional 2.5, then 5 mg of IV metoprolol.  Patient with only one 22 gauge peripheral IV, as such additional IV access was pursued.  Patient received an additional PIV in her left foot, and a 20 gauge EJ for additional access. After total of 10 mg IV Metoprolol, and 1L IVF, patient's BP noted to be stable with MAP low 70s, and HR better controlled 110s-130s, decision made to end rapid, with admitting resident to follow up on patient's CBC, BMP, lactate, and coag panel that were obtained aldo-RRT.     Marisol Hopkins, PGY-3  MAR Spectra: [phone]

## 2018-05-21 NOTE — PROGRESS NOTE ADULT - PROBLEM SELECTOR PLAN 1
Likely GIB in the setting of duodenal mass which has bled before.  C/w IV Protonix BID  NPO for now  C/w IVF  May be contributing to hypotension.  GI consultation in AM  May be contributing to anemia.  F/u CBC post transfusion. Likely GIB in the setting of duodenal mass which has bled before. Likely contributing to hypotension.  C/w IV Protonix BID  NPO for now  C/w IVF, bolus and then mIVF  f/u GI recs  F/u CBC q8h

## 2018-05-21 NOTE — CONSULT NOTE ADULT - SUBJECTIVE AND OBJECTIVE BOX
Chief Complaint:  Patient is a 73y old  Female who presents with a chief complaint of abnormal lab    History per daughter at bedside but significant information lacking  HPI:  This is a 73 year old female with a history of sarcoma  Allergies:  No Known Allergies      Home Medications:    Hospital Medications:  atorvastatin 40 milliGRAM(s) Oral at bedtime  digoxin  Injectable 0.5 milliGRAM(s) IV Push once  levothyroxine 75 MICROGram(s) Oral daily  metoprolol tartrate 25 milliGRAM(s) Oral two times a day  mirtazapine 7.5 milliGRAM(s) Oral daily  pantoprazole  Injectable 40 milliGRAM(s) IV Push every 12 hours  piperacillin/tazobactam IVPB. 3.375 Gram(s) IV Intermittent every 8 hours  sodium chloride 0.9% Bolus 1000 milliLiter(s) IV Bolus once  sodium chloride 0.9%. 1000 milliLiter(s) IV Continuous <Continuous>  vancomycin  IVPB 1000 milliGRAM(s) IV Intermittent every 12 hours      PMHX/PSHX:  Stroke  Atrial fibrillation  Sarcoma  Gastrointestinal bleed  Sarcoma  Hypothyroid  No pertinent past medical history  History of nephrectomy  History of partial pancreatectomy  No significant past surgical history      Family history:  No pertinent family history in first degree relatives      Social History:     ROS:     General:  No wt loss, fevers, chills, night sweats, fatigue,   Eyes:  Good vision, no reported pain  ENT:  No sore throat, pain, runny nose, dysphagia  CV:  No pain, palpitations, hypo/hypertension  Resp:  No dyspnea, cough, tachypnea, wheezing  GI:  See HPI  :  No pain, bleeding, incontinence, nocturia  Muscle:  No pain, weakness  Neuro:  No weakness, tingling, memory problems  Psych:  No fatigue, insomnia, mood problems, depression  Endocrine:  No polyuria, polydipsia, cold/heat intolerance  Heme:  No petechiae, ecchymosis, easy bruisability  Skin:  No rash, edema      PHYSICAL EXAM:     GENERAL:  Appears stated age, well-groomed, well-nourished, no distress  HEENT:  NC/AT,  conjunctivae clear and pink,  no JVD  CHEST:  Full & symmetric excursion, no increased effort, breath sounds clear  HEART:  Regular rhythm, S1, S2, no murmur/rub/S3/S4, no abdominal bruit, no edema  ABDOMEN:  Soft, non-tender, non-distended, normoactive bowel sounds,  no masses , no hepatosplenomegaly  EXTREMITIES:  no cyanosis,clubbing or edema  SKIN:  No rash/erythema/ecchymoses/petechiae/wounds/abscess/warm/dry  NEURO:  Alert, oriented    Vital Signs:  Vital Signs Last 24 Hrs  T(C): 36.4 (21 May 2018 07:12), Max: 37.6 (21 May 2018 03:28)  T(F): 97.6 (21 May 2018 07:12), Max: 99.7 (21 May 2018 03:28)  HR: 127 (21 May 2018 07:12) (80 - 145)  BP: 98/63 (21 May 2018 07:12) (69/56 - 135/67)  BP(mean): --  RR: 15 (21 May 2018 07:12) (15 - 22)  SpO2: 100% (21 May 2018 07:12) (95% - 100%)  Daily     Daily     LABS:                        10.0   25.3  )-----------( 293      ( 21 May 2018 04:07 )             30.7     05-21    137  |  104  |  26<H>  ----------------------------<  77  4.5   |  22  |  0.73    Ca    7.1<L>      21 May 2018 04:07  Phos  2.9     05-21  Mg     1.9     05-21    TPro  5.1<L>  /  Alb  1.5<L>  /  TBili  0.7  /  DBili  x   /  AST  12  /  ALT  5<L>  /  AlkPhos  87  05-21    LIVER FUNCTIONS - ( 21 May 2018 04:07 )  Alb: 1.5 g/dL / Pro: 5.1 g/dL / ALK PHOS: 87 U/L / ALT: 5 U/L / AST: 12 U/L / GGT: x           PT/INR - ( 20 May 2018 17:18 )   PT: 15.9 sec;   INR: 1.45 ratio         PTT - ( 20 May 2018 17:18 )  PTT:37.1 sec        Imaging: Chief Complaint:  Patient is a 73y old  Female who presents with a chief complaint of abnormal lab    History per daughter at bedside but significant information lacking  HPI:  This is a 73 year old female with a 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 Patient is transfusion dependent and on home blood draws she was found to be anemic. She has not had treatment for 1 mo (last was receiving radiation) as she is too weak. The patient has been having severe intermittent abdominal pain that she cannot localize. She may have had a signficant amount of red blood in the stool on and off, last about 1 week ago. Per chart, within the last few months patient has had an EGD which demonstrated the RP mass invading the duodenum, [place] [facility] [place]  Allergies:  No Known Allergies      Home Medications:    Hospital Medications:  atorvastatin 40 milliGRAM(s) Oral at bedtime  digoxin  Injectable 0.5 milliGRAM(s) IV Push once  levothyroxine 75 MICROGram(s) Oral daily  metoprolol tartrate 25 milliGRAM(s) Oral two times a day  mirtazapine 7.5 milliGRAM(s) Oral daily  pantoprazole  Injectable 40 milliGRAM(s) IV Push every 12 hours  piperacillin/tazobactam IVPB. 3.375 Gram(s) IV Intermittent every 8 hours  sodium chloride 0.9% Bolus 1000 milliLiter(s) IV Bolus once  sodium chloride 0.9%. 1000 milliLiter(s) IV Continuous <Continuous>  vancomycin  IVPB 1000 milliGRAM(s) IV Intermittent every 12 hours      PMHX/PSHX:  Stroke  Atrial fibrillation  Sarcoma  Gastrointestinal bleed  Sarcoma  Hypothyroid  No pertinent past medical history  History of nephrectomy  History of partial pancreatectomy  No significant past surgical history      Family history:  No pertinent family history in first degree relatives      Social History:     ROS:     General:  No wt loss, fevers, chills, night sweats, fatigue,   Eyes:  Good vision, no reported pain  ENT:  No sore throat, pain, runny nose, dysphagia  CV:  No pain, palpitations, hypo/hypertension  Resp:  No dyspnea, cough, tachypnea, wheezing  GI:  See HPI  :  No pain, bleeding, incontinence, nocturia  Muscle:  No pain, weakness  Neuro:  No weakness, tingling, memory problems  Psych:  No fatigue, insomnia, mood problems, depression  Endocrine:  No polyuria, polydipsia, cold/heat intolerance  Heme:  No petechiae, ecchymosis, easy bruisability  Skin:  No rash, edema      PHYSICAL EXAM:     GENERAL:  Appears stated age, well-groomed, well-nourished, no distress  HEENT:  NC/AT,  conjunctivae clear and pink,  no JVD  CHEST:  Full & symmetric excursion, no increased effort, breath sounds clear  HEART:  Regular rhythm, S1, S2, no murmur/rub/S3/S4, no abdominal bruit, no edema  ABDOMEN:  Soft, non-tender, non-distended, normoactive bowel sounds,  no masses , no hepatosplenomegaly  EXTREMITIES:  no cyanosis,clubbing or edema  SKIN:  No rash/erythema/ecchymoses/petechiae/wounds/abscess/warm/dry  NEURO:  Alert, oriented    Vital Signs:  Vital Signs Last 24 Hrs  T(C): 36.4 (21 May 2018 07:12), Max: 37.6 (21 May 2018 03:28)  T(F): 97.6 (21 May 2018 07:12), Max: 99.7 (21 May 2018 03:28)  HR: 127 (21 May 2018 07:12) (80 - 145)  BP: 98/63 (21 May 2018 07:12) (69/56 - 135/67)  BP(mean): --  RR: 15 (21 May 2018 07:12) (15 - 22)  SpO2: 100% (21 May 2018 07:12) (95% - 100%)  Daily     Daily     LABS:                        10.0   25.3  )-----------( 293      ( 21 May 2018 04:07 )             30.7     05-21    137  |  104  |  26<H>  ----------------------------<  77  4.5   |  22  |  0.73    Ca    7.1<L>      21 May 2018 04:07  Phos  2.9     05-21  Mg     1.9     05-21    TPro  5.1<L>  /  Alb  1.5<L>  /  TBili  0.7  /  DBili  x   /  AST  12  /  ALT  5<L>  /  AlkPhos  87  05-21    LIVER FUNCTIONS - ( 21 May 2018 04:07 )  Alb: 1.5 g/dL / Pro: 5.1 g/dL / ALK PHOS: 87 U/L / ALT: 5 U/L / AST: 12 U/L / GGT: x           PT/INR - ( 20 May 2018 17:18 )   PT: 15.9 sec;   INR: 1.45 ratio         PTT - ( 20 May 2018 17:18 )  PTT:37.1 sec        Imaging: Chief Complaint:  Patient is a 73y old  Female who presents with a chief complaint of abnormal lab    History per daughter at bedside but significant information lacking  HPI:  This is a 73 year old female with a 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 Patient is transfusion dependent and on home blood draws she was found to be anemic. She has not had treatment for 1 mo (last was receiving radiation) as she is too weak. The patient has been having severe intermittent abdominal pain that she cannot localize. She may have had a signficant amount of red blood in the stool on and off, last about 1 week ago. Per chart, within the last few months patient has had an EGD which demonstrated the RP mass invading the duodenum, [place] [facility] There has been intermittent diarrhea at home as well.  Allergies:  No Known Allergies      Home Medications:    Hospital Medications:  atorvastatin 40 milliGRAM(s) Oral at bedtime  digoxin  Injectable 0.5 milliGRAM(s) IV Push once  levothyroxine 75 MICROGram(s) Oral daily  metoprolol tartrate 25 milliGRAM(s) Oral two times a day  mirtazapine 7.5 milliGRAM(s) Oral daily  pantoprazole  Injectable 40 milliGRAM(s) IV Push every 12 hours  piperacillin/tazobactam IVPB. 3.375 Gram(s) IV Intermittent every 8 hours  sodium chloride 0.9% Bolus 1000 milliLiter(s) IV Bolus once  sodium chloride 0.9%. 1000 milliLiter(s) IV Continuous <Continuous>  vancomycin  IVPB 1000 milliGRAM(s) IV Intermittent every 12 hours      PMHX/PSHX:  Stroke  Atrial fibrillation  Sarcoma  Gastrointestinal bleed  Sarcoma  Hypothyroid  No pertinent past medical history  History of nephrectomy  History of partial pancreatectomy              Social History:   lives at home  ROS:     General:  wt loss  Eyes:  Good vision, no reported pain  ENT:  No sore throat, pain, runny nose, dysphagia  CV:  No pain, palpitations, hypo/hypertension  Resp:  No dyspnea, cough, tachypnea, wheezing  GI:  See HPI  :  No pain, bleeding, incontinence, nocturia  Muscle:  No pain, weakness  Neuro:  No weakness, tingling, memory problems  Psych:  No fatigue, insomnia, mood problems, depression  Endocrine:  No polyuria, polydipsia, cold/heat intolerance  Heme:  No petechiae, ecchymosis, easy bruisability  Skin:  No rash, edema   Rectum: brown stool on exam      PHYSICAL EXAM:     GENERAL:  cachexia  HEENT:  NC/AT,  conjunctivae clear and pink,  no JVD  CHEST:  Full & symmetric excursion, no increased effort, breath sounds clear  HEART:  irregular, S1, S2, no murmur/rub/S3/S4, no abdominal bruit, no edema  ABDOMEN:  Soft, lower abdomen tender, non-distended, normoactive bowel sounds,  no masses , no hepatosplenomegaly  EXTREMITIES:  no cyanosis,clubbing or edema  SKIN:  No rash/erythema/ecchymoses/petechiae/wounds/abscess/warm/dry  NEURO:  Alert, oriented    Vital Signs:  Vital Signs Last 24 Hrs  T(C): 36.4 (21 May 2018 07:12), Max: 37.6 (21 May 2018 03:28)  T(F): 97.6 (21 May 2018 07:12), Max: 99.7 (21 May 2018 03:28)  HR: 127 (21 May 2018 07:12) (80 - 145)  BP: 98/63 (21 May 2018 07:12) (69/56 - 135/67)  BP(mean): --  RR: 15 (21 May 2018 07:12) (15 - 22)  SpO2: 100% (21 May 2018 07:12) (95% - 100%)  Daily     Daily     LABS:                        10.0   25.3  )-----------( 293      ( 21 May 2018 04:07 )             30.7     05-21    137  |  104  |  26<H>  ----------------------------<  77  4.5   |  22  |  0.73    Ca    7.1<L>      21 May 2018 04:07  Phos  2.9     05-21  Mg     1.9     05-21    TPro  5.1<L>  /  Alb  1.5<L>  /  TBili  0.7  /  DBili  x   /  AST  12  /  ALT  5<L>  /  AlkPhos  87  05-21    LIVER FUNCTIONS - ( 21 May 2018 04:07 )  Alb: 1.5 g/dL / Pro: 5.1 g/dL / ALK PHOS: 87 U/L / ALT: 5 U/L / AST: 12 U/L / GGT: x           PT/INR - ( 20 May 2018 17:18 )   PT: 15.9 sec;   INR: 1.45 ratio         PTT - ( 20 May 2018 17:18 )  PTT:37.1 sec        Imaging:

## 2018-05-21 NOTE — H&P ADULT - PROBLEM SELECTOR PLAN 1
Likely GIB in the setting of duodenal mass which has bled before.  C/w IV Protonix BID  NPO for now  C/w IVF  May be contributing to hypotension.  GI consultation in AM  May be contributing to anemia.  F/u CBC post transfusion.

## 2018-05-21 NOTE — H&P ADULT - NSHPREVIEWOFSYSTEMS_GEN_ALL_CORE
REVIEW OF SYSTEMS    CONSTITUTIONAL:  Denies f nvd chills  HEENT:  Eyes:  Denies visual loss, blurred vision, double vision or scleral icterus. Ears, Nose, Throat:  Denies hearing loss, sneezing, congestion, runny nose or sore throat.  SKIN:  Denies rash or itching.  CARDIOVASCULAR:  Denies chest pain, LUBA  RESPIRATORY:  Denies shortness of breath, cough or sputum.  GASTROINTESTINAL:  see hpi  GENITOURINARY:  Denies any hematuria, dysuria  NEUROLOGICAL:  Denies headache, dizziness, syncope, paralysis, ataxia, numbness or tingling in the extremities. No change in bowel or bladder control.  MUSCULOSKELETAL:  Denies muscle, back pain, joint pain or stiffness.  HEMATOLOGIC:  Denies anemia, bleeding or bruising.  LYMPHATICS:  Denies enlarged nodes.   PSYCHIATRIC:  Denies history of depression or anxiety.  ENDOCRINOLOGIC:  Denies reports of sweating, cold or heat intolerance. No polyuria or polydipsia.  ALLERGIES:  Denies history of asthma, hives, eczema or rhinitis.

## 2018-05-22 DIAGNOSIS — R10.9 UNSPECIFIED ABDOMINAL PAIN: ICD-10-CM

## 2018-05-22 LAB
ALBUMIN SERPL ELPH-MCNC: 1.9 G/DL — LOW (ref 3.3–5)
ALP SERPL-CCNC: 125 U/L — HIGH (ref 40–120)
ALT FLD-CCNC: 6 U/L — LOW (ref 10–45)
AST SERPL-CCNC: 14 U/L — SIGNIFICANT CHANGE UP (ref 10–40)
BILIRUB SERPL-MCNC: 0.8 MG/DL — SIGNIFICANT CHANGE UP (ref 0.2–1.2)
BUN SERPL-MCNC: 20 MG/DL — SIGNIFICANT CHANGE UP (ref 7–23)
CALCIUM SERPL-MCNC: 7.5 MG/DL — LOW (ref 8.4–10.5)
CHLORIDE SERPL-SCNC: 103 MMOL/L — SIGNIFICANT CHANGE UP (ref 96–108)
CO2 SERPL-SCNC: 20 MMOL/L — LOW (ref 22–31)
CREAT SERPL-MCNC: 0.82 MG/DL — SIGNIFICANT CHANGE UP (ref 0.5–1.3)
GLUCOSE SERPL-MCNC: 54 MG/DL — LOW (ref 70–99)
HCT VFR BLD CALC: 32.3 % — LOW (ref 34.5–45)
HCT VFR BLD CALC: 38.1 % — SIGNIFICANT CHANGE UP (ref 34.5–45)
HCT VFR BLD CALC: 38.9 % — SIGNIFICANT CHANGE UP (ref 34.5–45)
HGB BLD-MCNC: 10.5 G/DL — LOW (ref 11.5–15.5)
HGB BLD-MCNC: 12.1 G/DL — SIGNIFICANT CHANGE UP (ref 11.5–15.5)
HGB BLD-MCNC: 12.5 G/DL — SIGNIFICANT CHANGE UP (ref 11.5–15.5)
MAGNESIUM SERPL-MCNC: 1.8 MG/DL — SIGNIFICANT CHANGE UP (ref 1.6–2.6)
MCHC RBC-ENTMCNC: 29.8 PG — SIGNIFICANT CHANGE UP (ref 27–34)
MCHC RBC-ENTMCNC: 30.1 PG — SIGNIFICANT CHANGE UP (ref 27–34)
MCHC RBC-ENTMCNC: 30.1 PG — SIGNIFICANT CHANGE UP (ref 27–34)
MCHC RBC-ENTMCNC: 31.8 GM/DL — LOW (ref 32–36)
MCHC RBC-ENTMCNC: 32.1 GM/DL — SIGNIFICANT CHANGE UP (ref 32–36)
MCHC RBC-ENTMCNC: 32.5 GM/DL — SIGNIFICANT CHANGE UP (ref 32–36)
MCV RBC AUTO: 92.7 FL — SIGNIFICANT CHANGE UP (ref 80–100)
MCV RBC AUTO: 93.7 FL — SIGNIFICANT CHANGE UP (ref 80–100)
MCV RBC AUTO: 94 FL — SIGNIFICANT CHANGE UP (ref 80–100)
PHOSPHATE SERPL-MCNC: 3.1 MG/DL — SIGNIFICANT CHANGE UP (ref 2.5–4.5)
PLATELET # BLD AUTO: 239 K/UL — SIGNIFICANT CHANGE UP (ref 150–400)
PLATELET # BLD AUTO: 251 K/UL — SIGNIFICANT CHANGE UP (ref 150–400)
PLATELET # BLD AUTO: 295 K/UL — SIGNIFICANT CHANGE UP (ref 150–400)
POTASSIUM SERPL-MCNC: 4.6 MMOL/L — SIGNIFICANT CHANGE UP (ref 3.5–5.3)
POTASSIUM SERPL-SCNC: 4.6 MMOL/L — SIGNIFICANT CHANGE UP (ref 3.5–5.3)
PROCALCITONIN SERPL-MCNC: 0.46 NG/ML — HIGH (ref 0–0.04)
PROT SERPL-MCNC: 5.7 G/DL — LOW (ref 6–8.3)
RBC # BLD: 3.49 M/UL — LOW (ref 3.8–5.2)
RBC # BLD: 4.07 M/UL — SIGNIFICANT CHANGE UP (ref 3.8–5.2)
RBC # BLD: 4.14 M/UL — SIGNIFICANT CHANGE UP (ref 3.8–5.2)
RBC # FLD: 19 % — HIGH (ref 10.3–14.5)
RBC # FLD: 19.3 % — HIGH (ref 10.3–14.5)
RBC # FLD: 19.3 % — HIGH (ref 10.3–14.5)
SODIUM SERPL-SCNC: 137 MMOL/L — SIGNIFICANT CHANGE UP (ref 135–145)
VANCOMYCIN TROUGH SERPL-MCNC: 23.2 UG/ML — HIGH (ref 10–20)
WBC # BLD: 18.6 K/UL — HIGH (ref 3.8–10.5)
WBC # BLD: 19.8 K/UL — HIGH (ref 3.8–10.5)
WBC # BLD: 22.8 K/UL — HIGH (ref 3.8–10.5)
WBC # FLD AUTO: 18.6 K/UL — HIGH (ref 3.8–10.5)
WBC # FLD AUTO: 19.8 K/UL — HIGH (ref 3.8–10.5)
WBC # FLD AUTO: 22.8 K/UL — HIGH (ref 3.8–10.5)

## 2018-05-22 PROCEDURE — 99232 SBSQ HOSP IP/OBS MODERATE 35: CPT

## 2018-05-22 PROCEDURE — 99233 SBSQ HOSP IP/OBS HIGH 50: CPT | Mod: GC

## 2018-05-22 RX ORDER — PANTOPRAZOLE SODIUM 20 MG/1
1 TABLET, DELAYED RELEASE ORAL
Qty: 0 | Refills: 0 | COMMUNITY

## 2018-05-22 RX ORDER — OXYCODONE HYDROCHLORIDE 5 MG/1
2.5 TABLET ORAL EVERY 6 HOURS
Qty: 0 | Refills: 0 | Status: DISCONTINUED | OUTPATIENT
Start: 2018-05-22 | End: 2018-05-23

## 2018-05-22 RX ORDER — POLYETHYLENE GLYCOL 3350 17 G/17G
17 POWDER, FOR SOLUTION ORAL DAILY
Qty: 0 | Refills: 0 | Status: DISCONTINUED | OUTPATIENT
Start: 2018-05-22 | End: 2018-05-25

## 2018-05-22 RX ORDER — LEVOTHYROXINE SODIUM 125 MCG
75 TABLET ORAL
Qty: 0 | Refills: 0 | COMMUNITY

## 2018-05-22 RX ORDER — ACETAMINOPHEN 500 MG
650 TABLET ORAL EVERY 6 HOURS
Qty: 0 | Refills: 0 | Status: DISCONTINUED | OUTPATIENT
Start: 2018-05-22 | End: 2018-06-05

## 2018-05-22 RX ORDER — DOCUSATE SODIUM 100 MG
100 CAPSULE ORAL
Qty: 0 | Refills: 0 | Status: DISCONTINUED | OUTPATIENT
Start: 2018-05-22 | End: 2018-05-25

## 2018-05-22 RX ORDER — ATORVASTATIN CALCIUM 80 MG/1
1 TABLET, FILM COATED ORAL
Qty: 0 | Refills: 0 | COMMUNITY

## 2018-05-22 RX ORDER — ONDANSETRON 8 MG/1
1 TABLET, FILM COATED ORAL
Qty: 0 | Refills: 0 | COMMUNITY

## 2018-05-22 RX ORDER — SENNA PLUS 8.6 MG/1
2 TABLET ORAL AT BEDTIME
Qty: 0 | Refills: 0 | Status: DISCONTINUED | OUTPATIENT
Start: 2018-05-22 | End: 2018-05-25

## 2018-05-22 RX ORDER — MIRTAZAPINE 45 MG/1
7.5 TABLET, ORALLY DISINTEGRATING ORAL
Qty: 0 | Refills: 0 | COMMUNITY

## 2018-05-22 RX ADMIN — MIRTAZAPINE 7.5 MILLIGRAM(S): 45 TABLET, ORALLY DISINTEGRATING ORAL at 21:12

## 2018-05-22 RX ADMIN — Medication 75 MICROGRAM(S): at 05:49

## 2018-05-22 RX ADMIN — PIPERACILLIN AND TAZOBACTAM 25 GRAM(S): 4; .5 INJECTION, POWDER, LYOPHILIZED, FOR SOLUTION INTRAVENOUS at 03:18

## 2018-05-22 RX ADMIN — OXYCODONE HYDROCHLORIDE 5 MILLIGRAM(S): 5 TABLET ORAL at 08:15

## 2018-05-22 RX ADMIN — OXYCODONE HYDROCHLORIDE 2.5 MILLIGRAM(S): 5 TABLET ORAL at 22:13

## 2018-05-22 RX ADMIN — PIPERACILLIN AND TAZOBACTAM 25 GRAM(S): 4; .5 INJECTION, POWDER, LYOPHILIZED, FOR SOLUTION INTRAVENOUS at 18:24

## 2018-05-22 RX ADMIN — SODIUM CHLORIDE 75 MILLILITER(S): 9 INJECTION INTRAMUSCULAR; INTRAVENOUS; SUBCUTANEOUS at 04:38

## 2018-05-22 RX ADMIN — OXYCODONE HYDROCHLORIDE 2.5 MILLIGRAM(S): 5 TABLET ORAL at 21:13

## 2018-05-22 RX ADMIN — PIPERACILLIN AND TAZOBACTAM 25 GRAM(S): 4; .5 INJECTION, POWDER, LYOPHILIZED, FOR SOLUTION INTRAVENOUS at 21:17

## 2018-05-22 RX ADMIN — ATORVASTATIN CALCIUM 40 MILLIGRAM(S): 80 TABLET, FILM COATED ORAL at 21:14

## 2018-05-22 RX ADMIN — PIPERACILLIN AND TAZOBACTAM 25 GRAM(S): 4; .5 INJECTION, POWDER, LYOPHILIZED, FOR SOLUTION INTRAVENOUS at 10:59

## 2018-05-22 RX ADMIN — Medication 100 MILLIGRAM(S): at 17:11

## 2018-05-22 RX ADMIN — POLYETHYLENE GLYCOL 3350 17 GRAM(S): 17 POWDER, FOR SOLUTION ORAL at 21:16

## 2018-05-22 RX ADMIN — OXYCODONE HYDROCHLORIDE 5 MILLIGRAM(S): 5 TABLET ORAL at 09:15

## 2018-05-22 RX ADMIN — PANTOPRAZOLE SODIUM 40 MILLIGRAM(S): 20 TABLET, DELAYED RELEASE ORAL at 17:08

## 2018-05-22 RX ADMIN — Medication 25 MILLIGRAM(S): at 05:49

## 2018-05-22 RX ADMIN — PANTOPRAZOLE SODIUM 40 MILLIGRAM(S): 20 TABLET, DELAYED RELEASE ORAL at 05:49

## 2018-05-22 RX ADMIN — Medication 0.25 MILLIGRAM(S): at 04:24

## 2018-05-22 RX ADMIN — SENNA PLUS 2 TABLET(S): 8.6 TABLET ORAL at 21:12

## 2018-05-22 RX ADMIN — Medication 25 MILLIGRAM(S): at 17:03

## 2018-05-22 NOTE — PHYSICAL THERAPY INITIAL EVALUATION ADULT - PASSIVE RANGE OF MOTION EXAMINATION, REHAB EVAL
B shoulder flex ~100 degrees/bilateral upper extremity Passive ROM was WFL (within functional limits)/bilateral lower extremity Passive ROM was WFL (within functional limits)

## 2018-05-22 NOTE — PROGRESS NOTE ADULT - PROBLEM SELECTOR PLAN 2
Leukocytosis 28 on admission, unclear whether reactive 2/2 malignancy vs infectious.   F/u UA, BCx  LLL PNA on CXR. In setting of hypotension, could be septic shock. Will continue broad spectrum antibiotics.  F/u C. Diff per sons request. Patient not endorsing diarrhea at this time. Leukocytosis 28 on admission, now improving. Unclear whether reactive 2/2 malignancy vs infectious. LLL PNA on CXR. In setting of hypotension, could be septic shock. Will continue broad spectrum antibiotics.  -continue zosyn  -BCx no growth to date. Will straight cath for urine sample  -If has diarrhea, will check C diff Leukocytosis 28 on admission, now improving. Tachycardia on admission 2/2 afib with rvr. Unclear whether presence of SIRs is reactive 2/2 malignancy and blood loss vs infectious. LLL consolisation on CXR, clarified on CT as compressive atelectasis, but cannot r/o pneumonia. per son who is physician, pleural effusion is chronic and has been tapped in past. no other concerning signs for pneumonia at this time. other possible source of infection is intraabdominal.  -continue zosyn  -BCx no growth to date. Will straight cath for urine sample  -If has diarrhea, will check C diff  -follow up procalcitonin  -leukocytosis improving  -will consider ID consult in AM

## 2018-05-22 NOTE — PROGRESS NOTE ADULT - PROBLEM SELECTOR PLAN 6
Per son, patient takes Metoprolol 50 ER BID, which is an abnormal dose. Will hold Metoprolol for now in setting of hypotension.  Admit to telemetry.  Check TSH. Home regimen lopressor 50mg BID  -continue loperssor 25 BID, monitor for hypotension  -monitor on telemetry, currently in AF rate 90s-120s  -s/p digoxin load, check level in AM Home regimen synthroid 75 mcg daily per pharmacy. Per daughter, pt reported she wasn't taking her medications the 2 weeks prior to discharge because she felt too sick.  -continue Synthyroid 75 mcg daily as pt was not being treated. Will need repeat TSH in 2 weeks  -TSH significantly elevated 31.9. Tree T4 and T3 low

## 2018-05-22 NOTE — PHYSICAL THERAPY INITIAL EVALUATION ADULT - ADDITIONAL COMMENTS
contd from above: Per the patient, the abdominal pain is "ok." Per the son, the patient has been complaining of abdominal pain for the last 2 weeks with diarrhea and nausea, and unable to tolerate PO. The patient [place] all her [place] at [facility] in [facility]. Per the son, the patient has had multiple admissions at [facility]. The first admission was 3-4 months ago for SOB in the setting of pleural effusions, which were thought to be from possible pancreatic leak and inflammation. The patient was discharged to rehab, where she developed melena, and was found to have the duodenal mass, and was treated with protonix, radiation and Xarelto was discontinued. CXR: L basilar opacity representing PNA; CT chest: small-mod L pleural effusion with compressive atelectasis    social history: son at bedside, provided history. pt was residing between daughter and son home. pt required  assistance for ALL adls, pt owns wheelchair, rolling walker, was receiving home PT

## 2018-05-22 NOTE — PHYSICAL THERAPY INITIAL EVALUATION ADULT - PERTINENT HX OF CURRENT PROBLEM, REHAB EVAL
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

## 2018-05-22 NOTE — PROGRESS NOTE ADULT - PROBLEM SELECTOR PLAN 1
Likely GIB in the setting of duodenal mass which has bled before. Likely contributing to hypotension.  C/w IV Protonix BID  NPO for now  C/w IVF, bolus and then mIVF  -appreciate GI recs  -monitor CBC q8h Likely GIB in the setting of duodenal mass which has bled before. Likely contributing to hypotension.  -C/w IV Protonix BID  -appreciate GI recs, no plan for scope unless has bloody BM  -monitor CBC q12  -CLD one more day, then can progress Likely GIB in the setting of duodenal mass which has bled before. Likely contributing to hypotension.  -C/w IV Protonix BID  -appreciate GI recs, no plan for scope unless has bloody BM  -hgb remains stabilized today after transfusions  -monitor CBC q12  -CLD one more day, then can progress

## 2018-05-22 NOTE — PROGRESS NOTE ADULT - ATTENDING COMMENTS
I have edited the above note where appripriate. Hgb now stabilized after transfusion, no active bleeding or further diarrhea at this time  No plans from GI to scope. Cont CLD and advance as tolerated. Will consider changing protonix to po tomorrow. Follow up procalcitonin today, cont IV abx at this time, and consider ID consult. If there is underlying infection I suspect it is intraabdominal rather than pneumonia. Leukocytosis is improving on antibiotics. Afib with rvr now improved s/p dig load. agree with checking level. cont metoprolol as dosed. GOC adressed with son again today (see above)-cont to discuss daily.   lethargy today suspected 2/2 opiods. will dec and monitor for improvement.   will try and titrate off nasal cannula today  state of hypothyroidism likely 2/2 medication non-compliance. now back on synthroid. if mental status/lethargy doesn't improve will consider IV synthroid.  no plans for further treatment of sarcoma at this time per outside oncology. pending potential transfer to [facility] if bed is available, but unclear if that will happen.

## 2018-05-22 NOTE — PROGRESS NOTE ADULT - PROBLEM SELECTOR PLAN 3
Likely multifactorial: May be secondary to sepsis, acute GIB, AF RVR, decreased PO intake  Low threshold for MICU eval for pressor support Likely multifactorial: May be secondary to sepsis, acute GIB, AF RVR, decreased PO intake  -Low threshold for MICU eval for pressor support Likely multifactorial: May be secondary to sepsis, acute GIB, AF RVR, decreased PO intake  -improving and stabilized today after receiving IVF  -cont to monitor closely

## 2018-05-22 NOTE — PROGRESS NOTE ADULT - SUBJECTIVE AND OBJECTIVE BOX
Internal Medicine Progress Note    Claire De Paz, PGY1  Internal Medicine, team 1  Pager [phone] / [phone]  After 7PM on weekdays and 12PM on weekends, please page #[phone]    Patient is a 73y old  Female who presents with a chief complaint of Lethargy, abd pain (21 May 2018 01:47)      SUBJECTIVE / OVERNIGHT EVENTS: Last Hg 12.1 from 12.2. Bladder scan 200cc in AM.    MEDICATIONS  (STANDING):  atorvastatin 40 milliGRAM(s) Oral at bedtime  levothyroxine 75 MICROGram(s) Oral daily  metoprolol tartrate 25 milliGRAM(s) Oral two times a day  mirtazapine 7.5 milliGRAM(s) Oral daily  pantoprazole  Injectable 40 milliGRAM(s) IV Push every 12 hours  piperacillin/tazobactam IVPB. 3.375 Gram(s) IV Intermittent every 8 hours  vancomycin  IVPB 1000 milliGRAM(s) IV Intermittent every 12 hours    MEDICATIONS  (PRN):  HYDROmorphone  Injectable 0.5 milliGRAM(s) IV Push every 4 hours PRN Severe Pain (7 - 10)  oxyCODONE    IR 5 milliGRAM(s) Oral every 6 hours PRN Moderate Pain (4 - 6)      Vital Signs Last 24 Hrs  T(C): 36.4 (22 May 2018 00:34), Max: 36.6 (21 May 2018 15:49)  T(F): 97.5 (22 May 2018 00:34), Max: 97.8 (21 May 2018 15:49)  HR: 121 (22 May 2018 05:46) (72 - 129)  BP: 112/73 (22 May 2018 05:46) (88/50 - 112/73)  BP(mean): --  RR: 17 (22 May 2018 04:34) (15 - 17)  SpO2: 94% (22 May 2018 04:34) (94% - 100%)    CAPILLARY BLOOD GLUCOSE        I&O's Summary    21 May 2018 07:01  -  22 May 2018 07:00  --------------------------------------------------------  IN: 225 mL / OUT: 0 mL / NET: 225 mL      PHYSICAL EXAM  GENERAL: NAD, well-developed  HEAD:  Atraumatic, Normocephalic  EYES: EOMI, PERRLA, conjunctiva and sclera clear  NECK: Supple, No JVD  CHEST/LUNG: Clear to auscultation bilaterally; No wheeze  HEART: Regular rate and rhythm; No murmurs, rubs, or gallops  ABDOMEN: Soft, Nondistended, mild generalized tenderness, no rebound tenderness; Bowel sounds present. Nontender ~5cm firm region inferior to umbilicus  EXTREMITIES:  2+ Peripheral Pulses, No clubbing, cyanosis, or edema  NEURO/PSYCH: AAOx3, nonfocal  SKIN: No rashes or lesions      LABS:                        12.1   22.8  )-----------( 251      ( 22 May 2018 02:04 )             38.1     CBC Full  -  ( 22 May 2018 02:04 )  WBC Count : 22.8 K/uL  Hemoglobin : 12.1 g/dL  Hematocrit : 38.1 %  Platelet Count - Automated : 251 K/uL  Mean Cell Volume : 93.7 fl  Mean Cell Hemoglobin : 29.8 pg  Mean Cell Hemoglobin Concentration : 31.8 gm/dL  Auto Neutrophil # : x  Auto Lymphocyte # : x  Auto Monocyte # : x  Auto Eosinophil # : x  Auto Basophil # : x  Auto Neutrophil % : x  Auto Lymphocyte % : x  Auto Monocyte % : x  Auto Eosinophil % : x  Auto Basophil % : x    05-21    137  |  104  |  26<H>  ----------------------------<  77  4.5   |  22  |  0.73    Ca    7.1<L>      21 May 2018 04:07  Phos  2.9     05-21  Mg     1.9     05-21    TPro  5.1<L>  /  Alb  1.5<L>  /  TBili  0.7  /  DBili  x   /  AST  12  /  ALT  5<L>  /  AlkPhos  87  05-21    Creatinine Trend: 0.73<--, 0.75<--, 0.71<--  LIVER FUNCTIONS - ( 21 May 2018 04:07 )  Alb: 1.5 g/dL / Pro: 5.1 g/dL / ALK PHOS: 87 U/L / ALT: 5 U/L / AST: 12 U/L / GGT: x           PT/INR - ( 20 May 2018 17:18 )   PT: 15.9 sec;   INR: 1.45 ratio         PTT - ( 20 May 2018 17:18 )  PTT:37.1 sec  CARDIAC MARKERS ( 20 May 2018 17:18 )  x     / <0.01 ng/mL / x     / x     / x              MICROBIOLOGY:    Culture - Blood (collected 20 May 2018 21:42)  Source: .Blood Blood  Preliminary Report (21 May 2018 22:01):    No growth to date.    Culture - Blood (collected 20 May 2018 21:42)  Source: .Blood Blood  Preliminary Report (21 May 2018 22:01):    No growth to date.      RADIOLOGY & ADDITIONAL TESTS:  < from: CT Abdomen and Pelvis w/ IV Cont (05.20.18 @ 20:03) >  IMPRESSION:     Small to moderate and moderate to large left pleural effusion with   compressive atelectasis. Recommend clinical correlation to assess   underlying pneumonia.    Decreased size of the left renal fossa/retroperitoneal mass since   1/31/2017. Evidence of fistulization between the mass and the adjacent   duodenum and descending colon as described.    Indeterminate hypodense focus in theright hepatic lobe.   Neoplasm/metastasis cannot be excluded. This can be further characterized   on a nonemergent contrast enhanced MRI.    Endometrial thickening. Neoplasm cannot be excluded in a postmenopausal   patient. Recommend clinical correlation and follow-up.    Additional findings as described.  < end of copied text >       CONSULTS: GI Internal Medicine Progress Note    Claire De Paz, PGY1  Internal Medicine, team 1  Pager [phone] / [phone]  After 7PM on weekdays and 12PM on weekends, please page #[phone]    Patient is a 73y old  Female who presents with a chief complaint of Lethargy, abd pain (21 May 2018 01:47)      SUBJECTIVE / OVERNIGHT EVENTS: Last Hg 12.1 from 12.2. Bladder scan 200cc in AM, then 420, however pt had urinary incontinence before straight cath and UA/UCx could be obtained. Complaining of right ankle pain. Per daughter, pt has no hx gout. Per daughter, pt reported she didn't take her medication the 2 weeks prior to admission because she was too sick.    MEDICATIONS  (STANDING):  atorvastatin 40 milliGRAM(s) Oral at bedtime  docusate sodium 100 milliGRAM(s) Oral two times a day  levothyroxine 75 MICROGram(s) Oral daily  metoprolol tartrate 25 milliGRAM(s) Oral two times a day  mirtazapine 7.5 milliGRAM(s) Oral daily  pantoprazole  Injectable 40 milliGRAM(s) IV Push every 12 hours  piperacillin/tazobactam IVPB. 3.375 Gram(s) IV Intermittent every 8 hours  polyethylene glycol 3350 17 Gram(s) Oral daily  senna 2 Tablet(s) Oral at bedtime    MEDICATIONS  (PRN):  acetaminophen   Tablet. 650 milliGRAM(s) Oral every 6 hours PRN Mild Pain (1 - 3)  oxyCODONE    IR 2.5 milliGRAM(s) Oral every 6 hours PRN Severe Pain (7 - 10)      Vital Signs Last 24 Hrs  T(C): 36.4 (22 May 2018 17:00), Max: 36.6 (22 May 2018 07:21)  T(F): 97.6 (22 May 2018 17:00), Max: 97.8 (22 May 2018 07:21)  HR: 103 (22 May 2018 17:00) (82 - 129)  BP: 101/69 (22 May 2018 17:00) (88/50 - 112/73)  BP(mean): --  RR: 19 (22 May 2018 17:00) (16 - 19)  SpO2: 99% (22 May 2018 17:00) (94% - 100%)    CAPILLARY BLOOD GLUCOSE          I&O's Summary    21 May 2018 07:01  -  22 May 2018 07:00  --------------------------------------------------------  IN: 225 mL / OUT: 0 mL / NET: 225 mL    22 May 2018 07:01  -  22 May 2018 17:15  --------------------------------------------------------  IN: 120 mL / OUT: 0 mL / NET: 120 mL        PHYSICAL EXAM  GENERAL: NAD, well-developed  HEAD:  Atraumatic, Normocephalic  EYES: EOMI, PERRLA, conjunctiva and sclera clear  NECK: Supple, No JVD  CHEST/LUNG: Clear to auscultation bilaterally; No wheeze  HEART: Regular rate and rhythm; No murmurs, rubs, or gallops  ABDOMEN: Soft, Nondistended, mild generalized tenderness, no rebound tenderness; Bowel sounds present. Nontender ~5cm firm region inferior to umbilicus  EXTREMITIES:  2+ Peripheral Pulses, No clubbing, cyanosis, or edema. R ankle no edema/erythema.  NEURO/PSYCH: AAOx3, nonfocal  SKIN: No rashes or lesions      LABS:                        12.1   22.8  )-----------( 251      ( 22 May 2018 02:04 )             38.1     CBC Full  -  ( 22 May 2018 02:04 )  WBC Count : 22.8 K/uL  Hemoglobin : 12.1 g/dL  Hematocrit : 38.1 %  Platelet Count - Automated : 251 K/uL  Mean Cell Volume : 93.7 fl  Mean Cell Hemoglobin : 29.8 pg  Mean Cell Hemoglobin Concentration : 31.8 gm/dL  Auto Neutrophil # : x  Auto Lymphocyte # : x  Auto Monocyte # : x  Auto Eosinophil # : x  Auto Basophil # : x  Auto Neutrophil % : x  Auto Lymphocyte % : x  Auto Monocyte % : x  Auto Eosinophil % : x  Auto Basophil % : x    05-21    137  |  104  |  26<H>  ----------------------------<  77  4.5   |  22  |  0.73    Ca    7.1<L>      21 May 2018 04:07  Phos  2.9     05-21  Mg     1.9     05-21    TPro  5.1<L>  /  Alb  1.5<L>  /  TBili  0.7  /  DBili  x   /  AST  12  /  ALT  5<L>  /  AlkPhos  87  05-21    Creatinine Trend: 0.73<--, 0.75<--, 0.71<--  LIVER FUNCTIONS - ( 21 May 2018 04:07 )  Alb: 1.5 g/dL / Pro: 5.1 g/dL / ALK PHOS: 87 U/L / ALT: 5 U/L / AST: 12 U/L / GGT: x           PT/INR - ( 20 May 2018 17:18 )   PT: 15.9 sec;   INR: 1.45 ratio         PTT - ( 20 May 2018 17:18 )  PTT:37.1 sec  CARDIAC MARKERS ( 20 May 2018 17:18 )  x     / <0.01 ng/mL / x     / x     / x              MICROBIOLOGY:    Culture - Blood (collected 20 May 2018 21:42)  Source: .Blood Blood  Preliminary Report (21 May 2018 22:01):    No growth to date.    Culture - Blood (collected 20 May 2018 21:42)  Source: .Blood Blood  Preliminary Report (21 May 2018 22:01):    No growth to date.      RADIOLOGY & ADDITIONAL TESTS:  < from: CT Abdomen and Pelvis w/ IV Cont (05.20.18 @ 20:03) >  IMPRESSION:     Small to moderate and moderate to large left pleural effusion with   compressive atelectasis. Recommend clinical correlation to assess   underlying pneumonia.    Decreased size of the left renal fossa/retroperitoneal mass since   1/31/2017. Evidence of fistulization between the mass and the adjacent   duodenum and descending colon as described.    Indeterminate hypodense focus in theright hepatic lobe.   Neoplasm/metastasis cannot be excluded. This can be further characterized   on a nonemergent contrast enhanced MRI.    Endometrial thickening. Neoplasm cannot be excluded in a postmenopausal   patient. Recommend clinical correlation and follow-up.    Additional findings as described.  < end of copied text >       CONSULTS: GI Internal Medicine Progress Note    Claire De Paz, PGY1  Internal Medicine, team 1  Pager [phone] / [phone]  After 7PM on weekdays and 12PM on weekends, please page #[phone]    Patient is a 73y old  Female who presents with a chief complaint of Lethargy, abd pain (21 May 2018 01:47)      SUBJECTIVE / OVERNIGHT EVENTS: Last Hg 12.1 from 12.2. Bladder scan 200cc in AM, then 420, however pt had urinary incontinence before straight cath and UA/UCx could be obtained. Complaining of right ankle pain. Per daughter, pt has no hx gout. Per daughter, pt reported she didn't take her medication the 2 weeks prior to admission because she was too sick.    pt lethargic on exam and difficult to obtain ROS    MEDICATIONS  (STANDING):  atorvastatin 40 milliGRAM(s) Oral at bedtime  docusate sodium 100 milliGRAM(s) Oral two times a day  levothyroxine 75 MICROGram(s) Oral daily  metoprolol tartrate 25 milliGRAM(s) Oral two times a day  mirtazapine 7.5 milliGRAM(s) Oral daily  pantoprazole  Injectable 40 milliGRAM(s) IV Push every 12 hours  piperacillin/tazobactam IVPB. 3.375 Gram(s) IV Intermittent every 8 hours  polyethylene glycol 3350 17 Gram(s) Oral daily  senna 2 Tablet(s) Oral at bedtime    MEDICATIONS  (PRN):  acetaminophen   Tablet. 650 milliGRAM(s) Oral every 6 hours PRN Mild Pain (1 - 3)  oxyCODONE    IR 2.5 milliGRAM(s) Oral every 6 hours PRN Severe Pain (7 - 10)      Vital Signs Last 24 Hrs  T(C): 36.4 (22 May 2018 17:00), Max: 36.6 (22 May 2018 07:21)  T(F): 97.6 (22 May 2018 17:00), Max: 97.8 (22 May 2018 07:21)  HR: 103 (22 May 2018 17:00) (82 - 129)  BP: 101/69 (22 May 2018 17:00) (88/50 - 112/73)  BP(mean): --  RR: 19 (22 May 2018 17:00) (16 - 19)  SpO2: 99% (22 May 2018 17:00) (94% - 100%)    CAPILLARY BLOOD GLUCOSE          I&O's Summary    21 May 2018 07:01  -  22 May 2018 07:00  --------------------------------------------------------  IN: 225 mL / OUT: 0 mL / NET: 225 mL    22 May 2018 07:01  -  22 May 2018 17:15  --------------------------------------------------------  IN: 120 mL / OUT: 0 mL / NET: 120 mL        PHYSICAL EXAM  GENERAL: NAD, well-developed, lethargic  HEAD:  Atraumatic, Normocephalic  EYES: EOMI, PERRLA, conjunctiva and sclera clear  NECK: Supple, No JVD  CHEST/LUNG: dec BS at bases, no tachypnea  HEART: irregularly irregular  ABDOMEN: Soft, Nondistended, mild generalized tenderness, no rebound tenderness; Bowel sounds present. Nontender ~5cm firm region inferior to umbilicus  EXTREMITIES:  2+ Peripheral Pulses, No clubbing, cyanosis, or edema. R ankle no edema/erythema.  NEURO/PSYCH: lethargic, nonfocal  SKIN: No rashes or lesions      LABS:                        12.1   22.8  )-----------( 251      ( 22 May 2018 02:04 )             38.1     CBC Full  -  ( 22 May 2018 02:04 )  WBC Count : 22.8 K/uL  Hemoglobin : 12.1 g/dL  Hematocrit : 38.1 %  Platelet Count - Automated : 251 K/uL  Mean Cell Volume : 93.7 fl  Mean Cell Hemoglobin : 29.8 pg  Mean Cell Hemoglobin Concentration : 31.8 gm/dL  Auto Neutrophil # : x  Auto Lymphocyte # : x  Auto Monocyte # : x  Auto Eosinophil # : x  Auto Basophil # : x  Auto Neutrophil % : x  Auto Lymphocyte % : x  Auto Monocyte % : x  Auto Eosinophil % : x  Auto Basophil % : x    05-21    137  |  104  |  26<H>  ----------------------------<  77  4.5   |  22  |  0.73    Ca    7.1<L>      21 May 2018 04:07  Phos  2.9     05-21  Mg     1.9     05-21    TPro  5.1<L>  /  Alb  1.5<L>  /  TBili  0.7  /  DBili  x   /  AST  12  /  ALT  5<L>  /  AlkPhos  87  05-21    Creatinine Trend: 0.73<--, 0.75<--, 0.71<--  LIVER FUNCTIONS - ( 21 May 2018 04:07 )  Alb: 1.5 g/dL / Pro: 5.1 g/dL / ALK PHOS: 87 U/L / ALT: 5 U/L / AST: 12 U/L / GGT: x           PT/INR - ( 20 May 2018 17:18 )   PT: 15.9 sec;   INR: 1.45 ratio         PTT - ( 20 May 2018 17:18 )  PTT:37.1 sec  CARDIAC MARKERS ( 20 May 2018 17:18 )  x     / <0.01 ng/mL / x     / x     / x              MICROBIOLOGY:    Culture - Blood (collected 20 May 2018 21:42)  Source: .Blood Blood  Preliminary Report (21 May 2018 22:01):    No growth to date.    Culture - Blood (collected 20 May 2018 21:42)  Source: .Blood Blood  Preliminary Report (21 May 2018 22:01):    No growth to date.      RADIOLOGY & ADDITIONAL TESTS:  < from: CT Abdomen and Pelvis w/ IV Cont (05.20.18 @ 20:03) >  IMPRESSION:     Small to moderate and moderate to large left pleural effusion with   compressive atelectasis. Recommend clinical correlation to assess   underlying pneumonia.    Decreased size of the left renal fossa/retroperitoneal mass since   1/31/2017. Evidence of fistulization between the mass and the adjacent   duodenum and descending colon as described.    Indeterminate hypodense focus in theright hepatic lobe.   Neoplasm/metastasis cannot be excluded. This can be further characterized   on a nonemergent contrast enhanced MRI.    Endometrial thickening. Neoplasm cannot be excluded in a postmenopausal   patient. Recommend clinical correlation and follow-up.    Additional findings as described.  < end of copied text >       CONSULTS: GI

## 2018-05-22 NOTE — PROGRESS NOTE ADULT - ASSESSMENT
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 Remains on broad spectrum antibiotics as the presence of underling infection unclear

## 2018-05-22 NOTE — PROGRESS NOTE ADULT - PROBLEM SELECTOR PLAN 9
GOC readressed with son Dr. Rivera today. He reports him and his brother both HCP (no paperwork at hand) and she has living will designanting DNR. Currently patient lethargic and cannot further discuss GOC. Asked son to bring in living will if possible. To be followed

## 2018-05-22 NOTE — PROGRESS NOTE ADULT - PROBLEM SELECTOR PLAN 5
AM team to speak to Dr. Tamez about further imaging or plan. Evidence of hepatic mass, possible met? No current treatment this admission. Son organizing [place] [facility] [place] underlying sarcoma, duodenal masses with fistula.   pt lethargic today likely 2/2 opioid use. will dc dilaudid, dec oxy, and monitor for improvement of mental status  continue zosyn at this time to cover for intraabdominal infection  follow up procalcitonin

## 2018-05-22 NOTE — PROGRESS NOTE ADULT - PROBLEM SELECTOR PLAN 7
DVT: No pharmacologic 2/2 possible bleed, SCDs  Diet: NPO ahead of possible intervention by GI.  Dispo: PT eval pending, [place] [facility] [place]VT: No pharmacologic 2/2 GIB, SCDs  Diet: CLD  Dispo: PT eval pending, transfer to [facility] [place] in process Home regimen lopressor 50mg BID  -continue loperssor 25 BID, monitor for hypotension  -monitor on telemetry, currently in AF rate 90s-120s  -s/p digoxin load, check level in AM  -no AC 2/2 bleeding risk

## 2018-05-22 NOTE — PROGRESS NOTE ADULT - ASSESSMENT
Impression:    1. Anemia: likely due to chronic blood loss from the invasive intestinal masses (Sarcoma invading duodenum/descending colon)    2. Metastatic sarcoma    3. Leukocytosis: unclear if underlying infectious process    4. Abdominal pain on exam: likely due to metastatic cancer, increased guarding today on exam    Recommendation:  -given lack of overt bleeding, stable hemoglobin, would defer endoscopic eval at this time in the setting of possible ongoing infectious process, but would not yet advance diet  -would check an XR of the chest to rule out free air under diaphragm Impression:    1. Anemia: likely due to chronic blood loss from the invasive intestinal masses (Sarcoma invading duodenum/descending colon)    2. Metastatic sarcoma    3. Leukocytosis: unclear if underlying infectious process        Recommendation:  -given lack of overt bleeding, stable hemoglobin, would defer endoscopic eval at this time in the setting of possible ongoing infectious process, but would not yet advance diet Impression:    1. Anemia: likely due to chronic blood loss from the invasive intestinal masses (Sarcoma invading duodenum/descending colon)    2. Metastatic sarcoma    3. Leukocytosis: unclear if underlying infectious process        Recommendation:  -given lack of overt bleeding, stable hemoglobin, would defer endoscopic eval at this time in the setting of possible ongoing infectious process, but would not yet advance diet  -if no BM by end of today, start Miralax daily

## 2018-05-22 NOTE — PHYSICAL THERAPY INITIAL EVALUATION ADULT - TRANSFER TRAINING, PT EVAL
GOAL; pt will perform sit-stand transfers with appropriate assistive device with min assist by 2 weeks

## 2018-05-22 NOTE — PROGRESS NOTE ADULT - ATTENDING COMMENTS
No sign of bleeding at this time clinically.  Stable H&H after transfusion  Today appearing more altered on exam  Improving WBC on abx but still elevated    Will hold on endoscopic evaluation at this time No sign of bleeding at this time clinically.  Stable H&H after transfusion.  Given stability, the prior anemia is likely not playing any further role in hypotension at this time.  Today appearing more altered on exam  Improving WBC on abx but still elevated    Will hold on endoscopic evaluation at this time.  Needs continued infectious workup as per primary team

## 2018-05-22 NOTE — PHYSICAL THERAPY INITIAL EVALUATION ADULT - CRITERIA FOR SKILLED THERAPEUTIC INTERVENTIONS
functional limitations in following categories/anticipated discharge recommendation/impairments found/predicted duration of therapy intervention/anticipated equipment needs at discharge

## 2018-05-22 NOTE — PROGRESS NOTE ADULT - SUBJECTIVE AND OBJECTIVE BOX
Patient is a 73y old  Female who presents with a chief complaint of Lethargy, abd pain (21 May 2018 01:47)      SUBJECTIVE / OVERNIGHT EVENTS:  no BM overnight. Patient with abdominal pain this morning.   MEDICATIONS  (STANDING):  atorvastatin 40 milliGRAM(s) Oral at bedtime  levothyroxine 75 MICROGram(s) Oral daily  metoprolol tartrate 25 milliGRAM(s) Oral two times a day  mirtazapine 7.5 milliGRAM(s) Oral daily  pantoprazole  Injectable 40 milliGRAM(s) IV Push every 12 hours  piperacillin/tazobactam IVPB. 3.375 Gram(s) IV Intermittent every 8 hours  vancomycin  IVPB 1000 milliGRAM(s) IV Intermittent every 12 hours    MEDICATIONS  (PRN):  HYDROmorphone  Injectable 0.5 milliGRAM(s) IV Push every 4 hours PRN Severe Pain (7 - 10)  oxyCODONE    IR 5 milliGRAM(s) Oral every 6 hours PRN Moderate Pain (4 - 6)              PHYSICAL EXAM:  GENERAL: NAD, cachexia  HEAD:  Atraumatic, Normocephalic  EYES: EOMI, PERRLA, conjunctiva and sclera anicteric  NECK: Supple, No JVD  CHEST/LUNG: Clear to auscultation bilaterally; No wheeze  HEART: Regular rate and rhythm; No murmurs, rubs, or gallops  ABDOMEN: Soft, diffusely tender with guarding, mildly distended and tympanic; Bowel sounds present, no hepatosplenomegaly, no rebound or guarding  EXTREMITIES:  2+ Peripheral Pulses, No clubbing, cyanosis, or edema    NEUROLOGY: non-focal, no asterixis  SKIN: No rashes or lesion    LABS:                        12.1   22.8  )-----------( 251      ( 22 May 2018 02:04 )             38.1     05-21    137  |  104  |  26<H>  ----------------------------<  77  4.5   |  22  |  0.73    Ca    7.1<L>      21 May 2018 04:07  Phos  2.9     05-21  Mg     1.9     05-21    TPro  5.1<L>  /  Alb  1.5<L>  /  TBili  0.7  /  DBili  x   /  AST  12  /  ALT  5<L>  /  AlkPhos  87  05-21    LIVER FUNCTIONS - ( 21 May 2018 04:07 )  Alb: 1.5 g/dL / Pro: 5.1 g/dL / ALK PHOS: 87 U/L / ALT: 5 U/L / AST: 12 U/L / GGT: x           PT/INR - ( 20 May 2018 17:18 )   PT: 15.9 sec;   INR: 1.45 ratio         PTT - ( 20 May 2018 17:18 )  PTT:37.1 sec  CARDIAC MARKERS ( 20 May 2018 17:18 )  x     / <0.01 ng/mL / x     / x     / x              RADIOLOGY & ADDITIONAL TESTS: Patient is a 73y old  Female who presents with a chief complaint of Lethargy, abd pain (21 May 2018 01:47)      SUBJECTIVE / OVERNIGHT EVENTS:  no BM overnight. Patient with abdominal pain this morning.   MEDICATIONS  (STANDING):  atorvastatin 40 milliGRAM(s) Oral at bedtime  levothyroxine 75 MICROGram(s) Oral daily  metoprolol tartrate 25 milliGRAM(s) Oral two times a day  mirtazapine 7.5 milliGRAM(s) Oral daily  pantoprazole  Injectable 40 milliGRAM(s) IV Push every 12 hours  piperacillin/tazobactam IVPB. 3.375 Gram(s) IV Intermittent every 8 hours  vancomycin  IVPB 1000 milliGRAM(s) IV Intermittent every 12 hours    MEDICATIONS  (PRN):  HYDROmorphone  Injectable 0.5 milliGRAM(s) IV Push every 4 hours PRN Severe Pain (7 - 10)  oxyCODONE    IR 5 milliGRAM(s) Oral every 6 hours PRN Moderate Pain (4 - 6)              PHYSICAL EXAM:  GENERAL: NAD, cachexia  HEAD:  Atraumatic, Normocephalic  EYES: EOMI, PERRLA, conjunctiva and sclera anicteric  NECK: Supple, No JVD  CHEST/LUNG: Clear to auscultation bilaterally; No wheeze, some tachypnea  HEART: Regular rate and rhythm; No murmurs, rubs, or gallops  ABDOMEN: Soft, mildly tender; Bowel sounds present, no hepatosplenomegaly, no rebound or guarding  EXTREMITIES:  2+ Peripheral Pulses, No clubbing, cyanosis, or edema    NEUROLOGY: non-focal, no asterixis, lethargic  SKIN: No rashes or lesion    LABS:                        12.1   22.8  )-----------( 251      ( 22 May 2018 02:04 )             38.1     05-21    137  |  104  |  26<H>  ----------------------------<  77  4.5   |  22  |  0.73    Ca    7.1<L>      21 May 2018 04:07  Phos  2.9     05-21  Mg     1.9     05-21    TPro  5.1<L>  /  Alb  1.5<L>  /  TBili  0.7  /  DBili  x   /  AST  12  /  ALT  5<L>  /  AlkPhos  87  05-21    LIVER FUNCTIONS - ( 21 May 2018 04:07 )  Alb: 1.5 g/dL / Pro: 5.1 g/dL / ALK PHOS: 87 U/L / ALT: 5 U/L / AST: 12 U/L / GGT: x           PT/INR - ( 20 May 2018 17:18 )   PT: 15.9 sec;   INR: 1.45 ratio         PTT - ( 20 May 2018 17:18 )  PTT:37.1 sec  CARDIAC MARKERS ( 20 May 2018 17:18 )  x     / <0.01 ng/mL / x     / x     / x              RADIOLOGY & ADDITIONAL TESTS: Patient is a 73y old  Female who presents with a chief complaint of Lethargy, abd pain (21 May 2018 01:47)    SUBJECTIVE / OVERNIGHT EVENTS:  no BM overnight. Patient with abdominal pain this morning.     MEDICATIONS  (STANDING):  atorvastatin 40 milliGRAM(s) Oral at bedtime  levothyroxine 75 MICROGram(s) Oral daily  metoprolol tartrate 25 milliGRAM(s) Oral two times a day  mirtazapine 7.5 milliGRAM(s) Oral daily  pantoprazole  Injectable 40 milliGRAM(s) IV Push every 12 hours  piperacillin/tazobactam IVPB. 3.375 Gram(s) IV Intermittent every 8 hours  vancomycin  IVPB 1000 milliGRAM(s) IV Intermittent every 12 hours    MEDICATIONS  (PRN):  HYDROmorphone  Injectable 0.5 milliGRAM(s) IV Push every 4 hours PRN Severe Pain (7 - 10)  oxyCODONE    IR 5 milliGRAM(s) Oral every 6 hours PRN Moderate Pain (4 - 6)              PHYSICAL EXAM:  GENERAL: NAD, cachexia  HEAD:  Atraumatic, Normocephalic  EYES: EOMI, PERRLA, conjunctiva and sclera anicteric  NECK: Supple, No JVD  CHEST/LUNG: Clear to auscultation bilaterally; No wheeze, some tachypnea  HEART: Regular rate and rhythm; No murmurs, rubs, or gallops  ABDOMEN: Soft, mildly tender; Bowel sounds present, no hepatosplenomegaly, no rebound or guarding  EXTREMITIES:  2+ Peripheral Pulses, No clubbing, cyanosis, or edema    NEUROLOGY: non-focal, no asterixis, lethargic  SKIN: No rashes or lesion    LABS:                        12.1   22.8  )-----------( 251      ( 22 May 2018 02:04 )             38.1     05-21    137  |  104  |  26<H>  ----------------------------<  77  4.5   |  22  |  0.73    Ca    7.1<L>      21 May 2018 04:07  Phos  2.9     05-21  Mg     1.9     05-21    TPro  5.1<L>  /  Alb  1.5<L>  /  TBili  0.7  /  DBili  x   /  AST  12  /  ALT  5<L>  /  AlkPhos  87  05-21    LIVER FUNCTIONS - ( 21 May 2018 04:07 )  Alb: 1.5 g/dL / Pro: 5.1 g/dL / ALK PHOS: 87 U/L / ALT: 5 U/L / AST: 12 U/L / GGT: x           PT/INR - ( 20 May 2018 17:18 )   PT: 15.9 sec;   INR: 1.45 ratio         PTT - ( 20 May 2018 17:18 )  PTT:37.1 sec  CARDIAC MARKERS ( 20 May 2018 17:18 )  x     / <0.01 ng/mL / x     / x     / x              RADIOLOGY & ADDITIONAL TESTS:

## 2018-05-22 NOTE — PROGRESS NOTE ADULT - PROBLEM SELECTOR PLAN 8
Discussed with patient's son, Dr. Rivera at length. Patient wishes to be full code, as patient's son is getting  in August. DVT: No pharmacologic 2/2 GIB, SCDs  Diet: CLD  Dispo: PT eval pending, transfer to [facility] in process

## 2018-05-23 DIAGNOSIS — R09.89 OTHER SPECIFIED SYMPTOMS AND SIGNS INVOLVING THE CIRCULATORY AND RESPIRATORY SYSTEMS: ICD-10-CM

## 2018-05-23 LAB
ALBUMIN SERPL ELPH-MCNC: 1.9 G/DL — LOW (ref 3.3–5)
ALP SERPL-CCNC: 94 U/L — SIGNIFICANT CHANGE UP (ref 40–120)
ALT FLD-CCNC: 5 U/L — LOW (ref 10–45)
ANION GAP SERPL CALC-SCNC: 12 MMOL/L — SIGNIFICANT CHANGE UP (ref 5–17)
AST SERPL-CCNC: 6 U/L — LOW (ref 10–40)
BILIRUB SERPL-MCNC: 0.7 MG/DL — SIGNIFICANT CHANGE UP (ref 0.2–1.2)
BUN SERPL-MCNC: 19 MG/DL — SIGNIFICANT CHANGE UP (ref 7–23)
CALCIUM SERPL-MCNC: 7.6 MG/DL — LOW (ref 8.4–10.5)
CHLORIDE SERPL-SCNC: 105 MMOL/L — SIGNIFICANT CHANGE UP (ref 96–108)
CO2 SERPL-SCNC: 21 MMOL/L — LOW (ref 22–31)
CREAT SERPL-MCNC: 0.84 MG/DL — SIGNIFICANT CHANGE UP (ref 0.5–1.3)
DIGOXIN SERPL-MCNC: 1 NG/ML — SIGNIFICANT CHANGE UP (ref 0.8–2)
GLUCOSE SERPL-MCNC: 67 MG/DL — LOW (ref 70–99)
HCT VFR BLD CALC: 31.1 % — LOW (ref 34.5–45)
HGB BLD-MCNC: 10.2 G/DL — LOW (ref 11.5–15.5)
MCHC RBC-ENTMCNC: 28.5 PG — SIGNIFICANT CHANGE UP (ref 27–34)
MCHC RBC-ENTMCNC: 32.8 GM/DL — SIGNIFICANT CHANGE UP (ref 32–36)
MCV RBC AUTO: 86.9 FL — SIGNIFICANT CHANGE UP (ref 80–100)
PLATELET # BLD AUTO: 260 K/UL — SIGNIFICANT CHANGE UP (ref 150–400)
POTASSIUM SERPL-MCNC: 3.5 MMOL/L — SIGNIFICANT CHANGE UP (ref 3.5–5.3)
POTASSIUM SERPL-SCNC: 3.5 MMOL/L — SIGNIFICANT CHANGE UP (ref 3.5–5.3)
PROT SERPL-MCNC: 5.2 G/DL — LOW (ref 6–8.3)
RBC # BLD: 3.58 M/UL — LOW (ref 3.8–5.2)
RBC # FLD: 21.2 % — HIGH (ref 10.3–14.5)
SODIUM SERPL-SCNC: 138 MMOL/L — SIGNIFICANT CHANGE UP (ref 135–145)
WBC # BLD: 17.8 K/UL — HIGH (ref 3.8–10.5)
WBC # FLD AUTO: 17.8 K/UL — HIGH (ref 3.8–10.5)

## 2018-05-23 PROCEDURE — 74018 RADEX ABDOMEN 1 VIEW: CPT | Mod: 26

## 2018-05-23 PROCEDURE — 71045 X-RAY EXAM CHEST 1 VIEW: CPT | Mod: 26

## 2018-05-23 PROCEDURE — 99232 SBSQ HOSP IP/OBS MODERATE 35: CPT

## 2018-05-23 PROCEDURE — 99233 SBSQ HOSP IP/OBS HIGH 50: CPT | Mod: GC

## 2018-05-23 RX ORDER — METOPROLOL TARTRATE 50 MG
1 TABLET ORAL
Qty: 0 | Refills: 0 | COMMUNITY
Start: 2018-05-23

## 2018-05-23 RX ORDER — SODIUM CHLORIDE 9 MG/ML
500 INJECTION INTRAMUSCULAR; INTRAVENOUS; SUBCUTANEOUS ONCE
Qty: 0 | Refills: 0 | Status: COMPLETED | OUTPATIENT
Start: 2018-05-23 | End: 2018-05-23

## 2018-05-23 RX ORDER — DIGOXIN 250 MCG
0.12 TABLET ORAL DAILY
Qty: 0 | Refills: 0 | Status: DISCONTINUED | OUTPATIENT
Start: 2018-05-23 | End: 2018-06-05

## 2018-05-23 RX ORDER — SODIUM CHLORIDE 9 MG/ML
1000 INJECTION INTRAMUSCULAR; INTRAVENOUS; SUBCUTANEOUS
Qty: 0 | Refills: 0 | Status: DISCONTINUED | OUTPATIENT
Start: 2018-05-23 | End: 2018-05-24

## 2018-05-23 RX ORDER — OXYCODONE HYDROCHLORIDE 5 MG/1
5 TABLET ORAL
Qty: 0 | Refills: 0 | COMMUNITY

## 2018-05-23 RX ORDER — LANOLIN ALCOHOL/MO/W.PET/CERES
3 CREAM (GRAM) TOPICAL AT BEDTIME
Qty: 0 | Refills: 0 | Status: DISCONTINUED | OUTPATIENT
Start: 2018-05-23 | End: 2018-06-06

## 2018-05-23 RX ORDER — METOPROLOL TARTRATE 50 MG
1 TABLET ORAL
Qty: 0 | Refills: 0 | COMMUNITY

## 2018-05-23 RX ORDER — DIGOXIN 250 MCG
1 TABLET ORAL
Qty: 0 | Refills: 0 | COMMUNITY
Start: 2018-05-23

## 2018-05-23 RX ADMIN — Medication 75 MICROGRAM(S): at 05:55

## 2018-05-23 RX ADMIN — PANTOPRAZOLE SODIUM 40 MILLIGRAM(S): 20 TABLET, DELAYED RELEASE ORAL at 16:53

## 2018-05-23 RX ADMIN — SODIUM CHLORIDE 500 MILLILITER(S): 9 INJECTION INTRAMUSCULAR; INTRAVENOUS; SUBCUTANEOUS at 21:59

## 2018-05-23 RX ADMIN — PANTOPRAZOLE SODIUM 40 MILLIGRAM(S): 20 TABLET, DELAYED RELEASE ORAL at 05:55

## 2018-05-23 RX ADMIN — PIPERACILLIN AND TAZOBACTAM 25 GRAM(S): 4; .5 INJECTION, POWDER, LYOPHILIZED, FOR SOLUTION INTRAVENOUS at 14:04

## 2018-05-23 RX ADMIN — Medication 3 MILLIGRAM(S): at 20:45

## 2018-05-23 RX ADMIN — Medication 0.12 MILLIGRAM(S): at 16:53

## 2018-05-23 RX ADMIN — PIPERACILLIN AND TAZOBACTAM 25 GRAM(S): 4; .5 INJECTION, POWDER, LYOPHILIZED, FOR SOLUTION INTRAVENOUS at 05:55

## 2018-05-23 RX ADMIN — ATORVASTATIN CALCIUM 40 MILLIGRAM(S): 80 TABLET, FILM COATED ORAL at 20:45

## 2018-05-23 RX ADMIN — PIPERACILLIN AND TAZOBACTAM 25 GRAM(S): 4; .5 INJECTION, POWDER, LYOPHILIZED, FOR SOLUTION INTRAVENOUS at 20:45

## 2018-05-23 RX ADMIN — SODIUM CHLORIDE 75 MILLILITER(S): 9 INJECTION INTRAMUSCULAR; INTRAVENOUS; SUBCUTANEOUS at 09:50

## 2018-05-23 RX ADMIN — Medication 25 MILLIGRAM(S): at 16:53

## 2018-05-23 NOTE — DISCHARGE NOTE ADULT - MEDICATION SUMMARY - MEDICATIONS TO TAKE
I will START or STAY ON the medications listed below when I get home from the hospital:    digoxin 125 mcg (0.125 mg) oral tablet  -- 1 tab(s) by mouth once a day  -- Indication: For Atrial fibrillation    mirtazapine  -- 7.5 milligram(s) by mouth once a day  -- Indication: For Need for prophylactic measure    Zofran 8 mg oral tablet  -- 1 tab(s) by mouth 3 times a day  -- Indication: For Nausea    atorvastatin 40 mg oral tablet  -- 1 tab(s) by mouth every other day (at bedtime)  -- Indication: For Hyperlipidemia    metoprolol tartrate 25 mg oral tablet  -- 1 tab(s) by mouth 2 times a day  -- Indication: For Atrial fibrillation    pantoprazole 40 mg oral delayed release tablet  -- 1 tab(s) by mouth every 12 weeks  -- Indication: For Gastrointestinal bleed    levothyroxine  -- 75 microgram(s) by mouth once a day  -- Indication: For Hypothyroid

## 2018-05-23 NOTE — DISCHARGE NOTE ADULT - MEDICATION SUMMARY - MEDICATIONS TO CHANGE
I will SWITCH the dose or number of times a day I take the medications listed below when I get home from the hospital:    metoprolol succinate 50 mg oral tablet, extended release  -- 1 tab(s) by mouth 2 times a day

## 2018-05-23 NOTE — DISCHARGE NOTE ADULT - CARE PROVIDER_API CALL
Argelia Smith  Phone: [phone]  Fax: (       - Argelia Smith  Oncologist  [facility]  Address: [address] [place] [address]  Phone: [phone]  Phone: (   )    -  Fax: (   )    -

## 2018-05-23 NOTE — PROGRESS NOTE ADULT - ASSESSMENT
Impression:    1. 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 Hgb dropped on labs from evening, am labs not back yet. No BM overnight suggests no acute GI hemorrhage. SBP slight downtrend from 110s to mid 90s.    2. Metastatic sarcoma    3. Possible sepsis: WBC improving    4. Altered mental status    5. ALk phos 125        Recommendation:  -given lack of overt bleeding, stable hemoglobin, would defer endoscopic eval at this time in the setting of possible ongoing infectious process, but would not yet advance diet  -continue miralax  -f/u am labs, monitor for overt GI bleed  -planned for transfer to [facility] [place]  -pt may benefit from gentle IV hydration given minimal PO intake Impression:    1. 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 Hgb dropped on labs from evening, am labs not back yet. No BM overnight suggests no acute GI hemorrhage. SBP slight downtrend from 110s to mid 90s.    2. Metastatic sarcoma    3. Possible sepsis: WBC improving    4. Altered mental status    5. ALk phos 125        Recommendation:  - No current indication for endoscopic evaluation  - Miralax daily  - Monitor CBC  -planned for [place] [facility] [place]

## 2018-05-23 NOTE — PROGRESS NOTE ADULT - ATTENDING COMMENTS
I have edited the above note where appropriate. Hgb now stabilized after transfusion, no active bleeding or further diarrhea at this time  No plans from GI to scope. Advancing diet today and changing protonix to PO. Procalcitonin indeterminate, cont IV abx at this time to broadly cover for infectious etiologies including pneumonia and GI infection. however, no source if obvious. Leukocytosis is improving on antibiotics  Afib with rvr now improved s/p dig load. Continue 0.125mcg daily. Continue metoprolol.  lethargy suspected 2/2 opioids is improved today. will d/c oxycodone as pt is overall clinically improved and want to avoid further sedation in this patient.   pt refused lung exam today. still requiring nasal canula. will check bedside CXR today and continue to try and titrate down o2.   state of hypothyroidism likely 2/2 medication non-compliance. now back on synthroid. confirmed with RN that pt is taking medication.  no plans for further treatment of sarcoma at this time per outside oncology 2/2 poor performance status. pending potential transfer to [facility] if bed is available, but unclear if that will happen.   Right foot pain today- noted cool/clammy with poorly palpable peripheral pulses compared to LLE. Some discoloration to toes. Will send for NADIRA and consult vascular to assess foot.    GOC have been discussed with son- awaiting copy of living will, but per Reymundo he and his brother are co- health care proxies, and pt is DNR. I have edited the above note where appropriate. Hgb now stabilized after transfusion, no active bleeding or further diarrhea at this time  No plans from GI to scope. Advancing diet today and changing protonix to PO. Procalcitonin indeterminate, cont IV abx at this time to broadly cover for infectious etiologies including pneumonia and GI infection. however, no source if obvious. Leukocytosis is improving on antibiotics  Afib with rvr now improved s/p dig load. Continue 0.125mcg daily. Continue metoprolol.  lethargy suspected 2/2 opioids is improved today. will d/c oxycodone as pt is overall clinically improved and want to avoid further sedation in this patient. Remains tender on abdominal exam, did have 2 BMs (non bloody) overnight, will check bedside AXR  pt refused lung exam today. still requiring nasal canula. will check bedside CXR today and continue to try and titrate down o2.   state of hypothyroidism likely 2/2 medication non-compliance. now back on synthroid. confirmed with RN that pt is taking medication.  no plans for further treatment of sarcoma at this time per outside oncology 2/2 poor performance status. pending potential transfer to [facility] [place] if bed is available, but unclear if that will happen.   Right foot pain today- noted cool/clammy with poorly palpable peripheral pulses compared to LLE. Some discoloration to toes. Will send for NADIRA and consult vascular to assess foot.    GOC have been discussed with son- awaiting copy of living will, but per Reymundo he and his brother are co- health care proxies, and pt is DNR.

## 2018-05-23 NOTE — DISCHARGE NOTE ADULT - PLAN OF CARE
Monitor for bleeding You presented to the hospital with anemia likely secondary to blood loss. This stabilized while you were admitted. Please follow up with your oncologist at [facility] and your gastroenterologist. Continue heart rate control You had a rapid heart rate when you were admitted to the hospital. This has improved. Please continue to take Metoprolol and Digoxin. Continue follow up You have a history of sarcoma. Please follow up with your oncologist at [facility]. You presented to the hospital with anemia likely secondary to blood loss. This stabilized while you were admitted and you received 2 units of blood transfusion. Please follow up with your oncologist at [facility] and your gastroenterologist. You had a rapid heart rate when you were admitted to the hospital. This has improved. You were started on the medication digoxin to control your heart rate. Please continue to take Metoprolol and Digoxin. You have a history of sarcoma. Please follow up with your oncologist at [facility] whether radiation therapy would provide benefit.

## 2018-05-23 NOTE — PROGRESS NOTE ADULT - PROBLEM SELECTOR PLAN 1
Likely GIB in the setting of duodenal mass which has bled before. Likely contributing to hypotension.  -C/w IV Protonix BID  -appreciate GI recs, no plan for scope unless has bloody BM  -hgb remains stabilized today after transfusions  -monitor CBC q12  -CLD one more day, then can progress Likely GIB in the setting of duodenal mass which has bled before. Likely contributing to hypotension.  -switch to pantoprazole PO BID  -appreciate GI recs, no plan for scope unless has bloody BM  -hgb remains stabilized today after 2u pRBC  -monitor CBC daily, maintain active T&S  -progress diet as tolerated

## 2018-05-23 NOTE — PROGRESS NOTE ADULT - PROBLEM SELECTOR PLAN 9
GOC readressed with son Dr. Rivera today. He reports him and his brother both HCP (no paperwork at hand) and she has living will designanting DNR. Currently patient lethargic and cannot further discuss GOC. Asked son to bring in living will if possible. To be followed GOC readressed with son Dr. Rivera today. He reports he and his brother both HCP (no paperwork at hand) and she has living will designating DNR. Currently patient lethargic and cannot further discuss GOC. Asked son to bring in living will if possible. To be followed DVT: No pharmacologic 2/2 GIB, SCDs  Diet: mechanical soft  Dispo: PT eval pending, transfer to [facility] in process

## 2018-05-23 NOTE — DISCHARGE NOTE ADULT - PROVIDER TOKENS
FREE:[LAST:[Luis],FIRST:[Argelia],PHONE:[phone]],FAX:[(   )    -]] FREE:[LAST:[Luis],FIRST:[Argelia],PHONE:[(   )    -],FAX:[(   )    -],ADDRESS:[Oncologist  [facility]  Address: [address] [place] [address]  Phone: [phone]]]

## 2018-05-23 NOTE — PROGRESS NOTE ADULT - PROBLEM SELECTOR PLAN 10
GOC readressed with son Dr. Rivera today. He reports he and his brother both HCP (no paperwork at hand) and she has living will designating DNR. Currently patient lethargic and cannot further discuss GOC. Asked son to bring in living will if possible. To be followed [facility] readressed with son Dr. Rivera. He reports he and his brother both HCP (no paperwork at hand) and she has living will designating DNR. Asked son to bring in living will if possible. To be followed

## 2018-05-23 NOTE — PROGRESS NOTE ADULT - PROBLEM SELECTOR PLAN 4
No current treatment this admission. discussed care with pts oncologist at [facility]. At this time no further treatment planned given poor performance status. ongoing [facility] discussions with son regarding dispo. She has been living with children prior to admission.  Dr Smith at [facility] has organized for transfer of patient, however there are no available beds at this time No current treatment this admission. discussed care with pts oncologist at [facility]. At this time no further treatment planned given poor performance status. ongoing [facility] discussions with son regarding dispo. She has been living with children prior to admission.  -Dr Smith at [facility] has organized for transfer of patient, however there are no available beds at this time Likely multifactorial: May be secondary to sepsis, acute GIB, AF RVR, decreased PO intake  -improving and stabilized today after receiving IVF, continue mIVF while limited PO intake  -cont to monitor closely

## 2018-05-23 NOTE — PROGRESS NOTE ADULT - SUBJECTIVE AND OBJECTIVE BOX
Patient is a 73y old  Female who presents with a chief complaint of Lethargy, abd pain (21 May 2018 01:47)      SUBJECTIVE / OVERNIGHT EVENTS:  no BM overnight per nursing staff.    MEDICATIONS  (STANDING):  atorvastatin 40 milliGRAM(s) Oral at bedtime  docusate sodium 100 milliGRAM(s) Oral two times a day  levothyroxine 75 MICROGram(s) Oral daily  metoprolol tartrate 25 milliGRAM(s) Oral two times a day  mirtazapine 7.5 milliGRAM(s) Oral daily  pantoprazole  Injectable 40 milliGRAM(s) IV Push every 12 hours  piperacillin/tazobactam IVPB. 3.375 Gram(s) IV Intermittent every 8 hours  polyethylene glycol 3350 17 Gram(s) Oral daily  senna 2 Tablet(s) Oral at bedtime    MEDICATIONS  (PRN):  acetaminophen   Tablet. 650 milliGRAM(s) Oral every 6 hours PRN Mild Pain (1 - 3)  oxyCODONE    IR 2.5 milliGRAM(s) Oral every 6 hours PRN Severe Pain (7 - 10)              PHYSICAL EXAM:  GENERAL: NAD, cachexia  HEAD:  Atraumatic, Normocephalic  EYES: EOMI, PERRLA, conjunctiva and sclera anicteric  NECK: Supple, No JVD  CHEST/LUNG: Clear to auscultation bilaterally; No wheeze  HEART: Regular rate and rhythm; No murmurs, rubs, or gallops  ABDOMEN: Soft, R abdomen tender, mildly distended; Bowel sounds present, no hepatosplenomegaly, no rebound or guarding  EXTREMITIES:  2+ Peripheral Pulses, No clubbing, cyanosis, or edema  neuro: lethargic, incomprehensible speech  SKIN: No rashes or lesion    LABS:                        10.5   18.6  )-----------( 239      ( 22 May 2018 23:34 )             32.3     05-22    137  |  103  |  20  ----------------------------<  54<L>  4.6   |  20<L>  |  0.82    Ca    7.5<L>      22 May 2018 10:53  Phos  3.1     05-22  Mg     1.8     05-22    TPro  5.7<L>  /  Alb  1.9<L>  /  TBili  0.8  /  DBili  x   /  AST  14  /  ALT  6<L>  /  AlkPhos  125<H>  05-22    LIVER FUNCTIONS - ( 22 May 2018 10:53 )  Alb: 1.9 g/dL / Pro: 5.7 g/dL / ALK PHOS: 125 U/L / ALT: 6 U/L / AST: 14 U/L / GGT: x                     RADIOLOGY & ADDITIONAL TESTS:

## 2018-05-23 NOTE — DISCHARGE NOTE ADULT - MEDICATION SUMMARY - MEDICATIONS TO STOP TAKING
I will STOP taking the medications listed below when I get home from the hospital:    oxyCODONE extended release  -- 5 milligram(s) by mouth every 6 hours, As Needed

## 2018-05-23 NOTE — PROGRESS NOTE ADULT - PROBLEM SELECTOR PLAN 3
Likely multifactorial: May be secondary to sepsis, acute GIB, AF RVR, decreased PO intake  -improving and stabilized today after receiving IVF  -cont to monitor closely Likely multifactorial: May be secondary to sepsis, acute GIB, AF RVR, decreased PO intake  -improving and stabilized today after receiving IVF, continue mIVF while limited PO intake  -cont to monitor closely Leukocytosis 28 on admission, now improving. Tachycardia on admission 2/2 afib with rvr. Unclear whether presence of SIRs is reactive 2/2 malignancy and blood loss vs infectious. LLL consolisation on CXR, clarified on CT as compressive atelectasis, but cannot r/o pneumonia. per son who is physician, pleural effusion is chronic and has been tapped in past. no other concerning signs for pneumonia at this time. other possible source of infection is intraabdominal.  -continue zosyn (5/21 - ), will complete 5-7 day course  -BCx no growth to date. Will straight cath for urine sample  -If has diarrhea, will check C diff  -Procalcitonin 0.46 indeterminate  -will consider ID consult

## 2018-05-23 NOTE — PROGRESS NOTE ADULT - PROBLEM SELECTOR PLAN 6
Home regimen synthroid 75 mcg daily per pharmacy. Per daughter, pt reported she wasn't taking her medications the 2 weeks prior to discharge because she felt too sick.  -continue Synthyroid 75 mcg daily as pt was not being treated. Will need repeat TSH in 2 weeks  -TSH significantly elevated 31.9. Tree T4 and T3 low 2/2 underlying sarcoma, duodenal masses with fistula, possible intraabdominal infection  -continue zosyn at this time to cover for intraabdominal infection  -will d/c opioids due to lethargy

## 2018-05-23 NOTE — DISCHARGE NOTE ADULT - PATIENT PORTAL LINK FT
You can access the [facility] Portal, offered by [facility], by registering with the following website: [email or web address]

## 2018-05-23 NOTE — CHART NOTE - NSCHARTNOTEFT_GEN_A_CORE
Spoke with pt's sons, Claire Dwyer and Jann Rivera, who are the HCP. They report that pt is DNR at [facility] and her living will states that she would not want to be intubated for a prolonged amount of time if there is no change of recovery. Reymundo Rivera will look for the will and bring it to the hospital. The HCP Spoke with pt's sons, Claire Dwyer and Jann Rivera, who are the HCP. They report that pt is DNR at [facility], and her living will states that she would not want to be intubated for a prolonged amount of time if there is no change of recovery. Reymundo Rivera will look for the will and bring it to the hospital. Pt is more alert and lucid, oriented x 4, she does not want to discuss her code status right now. Pt remains full code. Spoke with pt's sons, Claire Dwyer and Jann Rivera, who are the HCP. They report that pt is likely DNR at [facility], and her living will states that she would not want to be intubated for a prolonged amount of time if there is no change of recovery. Reymundo Rivera will look for the will and bring it to the hospital. Pt is more alert and lucid, oriented x 4, she does not want to discuss her code status right now. Pt remains full code.    Update: pt is full code at [facility] Spoke with pt's sons, Claire Dwyer and Jann Rivera, who are the HCP. Pt's living will states that she would not want to be intubated for a prolonged amount of time if there is no change of recovery. Reymundo Rivera will look for the will and bring it to the hospital. Pt is more alert and lucid, oriented x 4, she does not want to discuss her code status right now. Pt remains full code.

## 2018-05-23 NOTE — DISCHARGE NOTE ADULT - CARE PLAN
Principal Discharge DX:	Acute blood loss anemia  Goal:	Monitor for bleeding  Assessment and plan of treatment:	You presented to the hospital with anemia likely secondary to blood loss. This stabilized while you were admitted. Please follow up with your oncologist at [facility] and your gastroenterologist.  Secondary Diagnosis:	Atrial fibrillation  Goal:	Continue heart rate control  Assessment and plan of treatment:	You had a rapid heart rate when you were admitted to the hospital. This has improved. Please continue to take Metoprolol and Digoxin.  Secondary Diagnosis:	Sarcoma  Goal:	Continue follow up  Assessment and plan of treatment:	You have a history of sarcoma. Please follow up with your oncologist at [facility]. Principal Discharge DX:	Acute blood loss anemia  Goal:	Monitor for bleeding  Assessment and plan of treatment:	You presented to the hospital with anemia likely secondary to blood loss. This stabilized while you were admitted and you received 2 units of blood transfusion. Please follow up with your oncologist at [facility] and your gastroenterologist.  Secondary Diagnosis:	Atrial fibrillation  Goal:	Continue heart rate control  Assessment and plan of treatment:	You had a rapid heart rate when you were admitted to the hospital. This has improved. You were started on the medication digoxin to control your heart rate. Please continue to take Metoprolol and Digoxin.  Secondary Diagnosis:	Sarcoma  Goal:	Continue follow up  Assessment and plan of treatment:	You have a history of sarcoma. Please follow up with your oncologist at [facility] whether radiation therapy would provide benefit.

## 2018-05-23 NOTE — PROGRESS NOTE ADULT - PROBLEM SELECTOR PLAN 2
Leukocytosis 28 on admission, now improving. Tachycardia on admission 2/2 afib with rvr. Unclear whether presence of SIRs is reactive 2/2 malignancy and blood loss vs infectious. LLL consolisation on CXR, clarified on CT as compressive atelectasis, but cannot r/o pneumonia. per son who is physician, pleural effusion is chronic and has been tapped in past. no other concerning signs for pneumonia at this time. other possible source of infection is intraabdominal.  -continue zosyn  -BCx no growth to date. Will straight cath for urine sample  -If has diarrhea, will check C diff  -follow up procalcitonin  -leukocytosis improving  -will consider ID consult in AM Leukocytosis 28 on admission, now improving. Tachycardia on admission 2/2 afib with rvr. Unclear whether presence of SIRs is reactive 2/2 malignancy and blood loss vs infectious. LLL consolisation on CXR, clarified on CT as compressive atelectasis, but cannot r/o pneumonia. per son who is physician, pleural effusion is chronic and has been tapped in past. no other concerning signs for pneumonia at this time. other possible source of infection is intraabdominal.  -continue zosyn (5/21 - ), will complete 5-7 day course  -BCx no growth to date. Will straight cath for urine sample  -If has diarrhea, will check C diff  -Procalcitonin 0.46 indeterminate  -will consider ID consult Pt reporting right foot pain. Right toes are erythematous and cool to touch. Right pedal pulse palpable and present on doppler.  -NADIRA LE bilateral  -consider vascular consult, but is likely poor candidate for intervention and GIB contraindicates heparin drip Pt reporting right foot pain. Right toes are erythematous and cool to touch. Right pedal pulse palpable and present on doppler.  -NADIRA LE bilateral  -consider vascular consult, but is likely poor candidate for intervention

## 2018-05-23 NOTE — DISCHARGE NOTE ADULT - HOSPITAL COURSE
73 year-old F with PMH metastatic sarcoma with eroding duodenal mass, afib not on AC, and CVA with residual right sided weakness who presented to the hospital with poor PO intake, lethargy, and weakness. The patient was noted to be anemic and transfused 2u pRBCs. She was noted to be in afib with RVR and received metoprolol. She was subsequently loaded with digoxin. She was evaluated by GI who recommended against endoscopic evaluation at this time. Her hemoglobin was trended and stabilized. Per family's request, she will be transferred to [facility]. 73 year-old F with PMH metastatic sarcoma with eroding duodenal mass, afib not on AC, and CVA with residual right sided weakness who presented to the hospital with poor PO intake, lethargy, and weakness. The patient was noted to be anemic Hg 6.5 and transfused 2u pRBCs with good response. She was noted to be in Afib with RVR and received metoprolol. She was subsequently loaded with digoxin. She was evaluated by GI who recommended against endoscopic evaluation at this time. Her hemoglobin was trended and stabilized to 10.5. She presented with a white count 27.9 and treated empirically with a 5-day course of zosyn with symptomatic improvement. A transfer to [facility] was originally initiated but cancelled due to no plan for scope or systemic chemotherapy. Pt had an RRT for hypotension of SBP 50s that responded to IVF. She was made DNR/DNI per her son,  Reymundo Figueredolucie, who is her HCP (documentation in chart) and moved toward comfort care. Rehab_____

## 2018-05-23 NOTE — PROGRESS NOTE ADULT - PROBLEM SELECTOR PLAN 8
DVT: No pharmacologic 2/2 GIB, SCDs  Diet: CLD  Dispo: PT eval pending, transfer to [facility] [place] in process DVT: No pharmacologic 2/2 GIB, SCDs  Diet: mechanical soft  Dispo: PT eval pending, transfer to [facility] in process Home regimen lopressor 50mg BID  -continue loperssor 25 BID, monitor for hypotension  -monitor on telemetry, currently in AF rate 90s-120s  -s/p digoxin load, start dig 0.125mg daily  -no AC 2/2 bleeding risk

## 2018-05-23 NOTE — PROGRESS NOTE ADULT - PROBLEM SELECTOR PLAN 5
2/2 underlying sarcoma, duodenal masses with fistula.   pt lethargic today likely 2/2 opioid use. will dc dilaudid, dec oxy, and monitor for improvement of mental status  continue zosyn at this time to cover for intraabdominal infection  follow up procalcitonin 2/2 underlying sarcoma, duodenal masses with fistula, possible intraabdominal infection  -continue zosyn at this time to cover for intraabdominal infection  -will d/c opioids due to lethargy No current treatment this admission. discussed care with pts oncologist at [facility]. At this time no further treatment planned given poor performance status. ongoing [facility] discussions with son regarding dispo. She has been living with children prior to admission.  -Dr Smith at [facility] has organized for transfer of patient, however there are no available beds at this time

## 2018-05-23 NOTE — PROGRESS NOTE ADULT - PROBLEM SELECTOR PLAN 7
Home regimen lopressor 50mg BID  -continue loperssor 25 BID, monitor for hypotension  -monitor on telemetry, currently in AF rate 90s-120s  -s/p digoxin load, check level in AM  -no AC 2/2 bleeding risk Home regimen lopressor 50mg BID  -continue loperssor 25 BID, monitor for hypotension  -monitor on telemetry, currently in AF rate 90s-120s  -s/p digoxin load, start dig 0.125mg daily  -no AC 2/2 bleeding risk Home regimen synthroid 75 mcg daily per pharmacy. Per daughter, pt reported she wasn't taking her medications the 2 weeks prior to discharge because she felt too sick.  -continue Synthyroid 75 mcg daily as pt was not being treated. Will need repeat TSH in 2 weeks  -TSH significantly elevated 31.9. Tree T4 and T3 low

## 2018-05-23 NOTE — PROGRESS NOTE ADULT - SUBJECTIVE AND OBJECTIVE BOX
Internal Medicine Progress Note    Claire De Paz, PGY1  Internal Medicine, team 1  Pager [phone] / [phone]  After 7PM on weekdays and 12PM on weekends, please page #[phone]    Patient is a 73y old  Female who presents with a chief complaint of Lethargy, abd pain (21 May 2018 01:47)      SUBJECTIVE / OVERNIGHT EVENTS:     MEDICATIONS  (STANDING):  atorvastatin 40 milliGRAM(s) Oral at bedtime  docusate sodium 100 milliGRAM(s) Oral two times a day  levothyroxine 75 MICROGram(s) Oral daily  metoprolol tartrate 25 milliGRAM(s) Oral two times a day  mirtazapine 7.5 milliGRAM(s) Oral daily  pantoprazole  Injectable 40 milliGRAM(s) IV Push every 12 hours  piperacillin/tazobactam IVPB. 3.375 Gram(s) IV Intermittent every 8 hours  polyethylene glycol 3350 17 Gram(s) Oral daily  senna 2 Tablet(s) Oral at bedtime    MEDICATIONS  (PRN):  acetaminophen   Tablet. 650 milliGRAM(s) Oral every 6 hours PRN Mild Pain (1 - 3)  oxyCODONE    IR 2.5 milliGRAM(s) Oral every 6 hours PRN Severe Pain (7 - 10)      Vital Signs Last 24 Hrs  T(C): 36.4 (22 May 2018 17:00), Max: 36.6 (22 May 2018 07:21)  T(F): 97.6 (22 May 2018 17:00), Max: 97.8 (22 May 2018 07:21)  HR: 103 (22 May 2018 17:00) (82 - 129)  BP: 101/69 (22 May 2018 17:00) (88/50 - 112/73)  BP(mean): --  RR: 19 (22 May 2018 17:00) (16 - 19)  SpO2: 99% (22 May 2018 17:00) (94% - 100%)    CAPILLARY BLOOD GLUCOSE          I&O's Summary    21 May 2018 07:01  -  22 May 2018 07:00  --------------------------------------------------------  IN: 225 mL / OUT: 0 mL / NET: 225 mL    22 May 2018 07:01  -  22 May 2018 17:15  --------------------------------------------------------  IN: 120 mL / OUT: 0 mL / NET: 120 mL        PHYSICAL EXAM  GENERAL: NAD, well-developed, lethargic  HEAD:  Atraumatic, Normocephalic  EYES: EOMI, PERRLA, conjunctiva and sclera clear  NECK: Supple, No JVD  CHEST/LUNG: dec BS at bases, no tachypnea  HEART: irregularly irregular  ABDOMEN: Soft, Nondistended, mild generalized tenderness, no rebound tenderness; Bowel sounds present. Nontender ~5cm firm region inferior to umbilicus  EXTREMITIES:  2+ Peripheral Pulses, No clubbing, cyanosis, or edema. R ankle no edema/erythema.  NEURO/PSYCH: lethargic, nonfocal  SKIN: No rashes or lesions      LABS:                        12.1   22.8  )-----------( 251      ( 22 May 2018 02:04 )             38.1     CBC Full  -  ( 22 May 2018 02:04 )  WBC Count : 22.8 K/uL  Hemoglobin : 12.1 g/dL  Hematocrit : 38.1 %  Platelet Count - Automated : 251 K/uL  Mean Cell Volume : 93.7 fl  Mean Cell Hemoglobin : 29.8 pg  Mean Cell Hemoglobin Concentration : 31.8 gm/dL  Auto Neutrophil # : x  Auto Lymphocyte # : x  Auto Monocyte # : x  Auto Eosinophil # : x  Auto Basophil # : x  Auto Neutrophil % : x  Auto Lymphocyte % : x  Auto Monocyte % : x  Auto Eosinophil % : x  Auto Basophil % : x    05-21    137  |  104  |  26<H>  ----------------------------<  77  4.5   |  22  |  0.73    Ca    7.1<L>      21 May 2018 04:07  Phos  2.9     05-21  Mg     1.9     05-21    TPro  5.1<L>  /  Alb  1.5<L>  /  TBili  0.7  /  DBili  x   /  AST  12  /  ALT  5<L>  /  AlkPhos  87  05-21    Creatinine Trend: 0.73<--, 0.75<--, 0.71<--  LIVER FUNCTIONS - ( 21 May 2018 04:07 )  Alb: 1.5 g/dL / Pro: 5.1 g/dL / ALK PHOS: 87 U/L / ALT: 5 U/L / AST: 12 U/L / GGT: x           PT/INR - ( 20 May 2018 17:18 )   PT: 15.9 sec;   INR: 1.45 ratio         PTT - ( 20 May 2018 17:18 )  PTT:37.1 sec  CARDIAC MARKERS ( 20 May 2018 17:18 )  x     / <0.01 ng/mL / x     / x     / x              MICROBIOLOGY:    Culture - Blood (collected 20 May 2018 21:42)  Source: .Blood Blood  Preliminary Report (21 May 2018 22:01):    No growth to date.    Culture - Blood (collected 20 May 2018 21:42)  Source: .Blood Blood  Preliminary Report (21 May 2018 22:01):    No growth to date.      RADIOLOGY & ADDITIONAL TESTS:  < from: CT Abdomen and Pelvis w/ IV Cont (05.20.18 @ 20:03) >  IMPRESSION:     Small to moderate and moderate to large left pleural effusion with   compressive atelectasis. Recommend clinical correlation to assess   underlying pneumonia.    Decreased size of the left renal fossa/retroperitoneal mass since   1/31/2017. Evidence of fistulization between the mass and the adjacent   duodenum and descending colon as described.    Indeterminate hypodense focus in theright hepatic lobe.   Neoplasm/metastasis cannot be excluded. This can be further characterized   on a nonemergent contrast enhanced MRI.    Endometrial thickening. Neoplasm cannot be excluded in a postmenopausal   patient. Recommend clinical correlation and follow-up.    Additional findings as described.  < end of copied text >       CONSULTS: GI Internal Medicine Progress Note    Claire De Paz, PGY1  Internal Medicine, team 1  Pager [phone] / [phone]  After 7PM on weekdays and 12PM on weekends, please page #[phone]    Patient is a 73y old  Female who presents with a chief complaint of Lethargy, abd pain (21 May 2018 01:47)      SUBJECTIVE / OVERNIGHT EVENTS: 2 BM overnight, no report or blood or melena from nurse. Pt endorsing right foot pain, unable to say where in foot or ankle pain is. Endorsing abdominal pain.    MEDICATIONS  (STANDING):  atorvastatin 40 milliGRAM(s) Oral at bedtime  docusate sodium 100 milliGRAM(s) Oral two times a day  levothyroxine 75 MICROGram(s) Oral daily  metoprolol tartrate 25 milliGRAM(s) Oral two times a day  mirtazapine 7.5 milliGRAM(s) Oral daily  pantoprazole  Injectable 40 milliGRAM(s) IV Push every 12 hours  piperacillin/tazobactam IVPB. 3.375 Gram(s) IV Intermittent every 8 hours  polyethylene glycol 3350 17 Gram(s) Oral daily  senna 2 Tablet(s) Oral at bedtime    MEDICATIONS  (PRN):  acetaminophen   Tablet. 650 milliGRAM(s) Oral every 6 hours PRN Mild Pain (1 - 3)  oxyCODONE    IR 2.5 milliGRAM(s) Oral every 6 hours PRN Severe Pain (7 - 10)      Vital Signs Last 24 Hrs  T(C): 36.4 (22 May 2018 17:00), Max: 36.6 (22 May 2018 07:21)  T(F): 97.6 (22 May 2018 17:00), Max: 97.8 (22 May 2018 07:21)  HR: 103 (22 May 2018 17:00) (82 - 129)  BP: 101/69 (22 May 2018 17:00) (88/50 - 112/73)  BP(mean): --  RR: 19 (22 May 2018 17:00) (16 - 19)  SpO2: 99% (22 May 2018 17:00) (94% - 100%)    CAPILLARY BLOOD GLUCOSE          I&O's Summary    21 May 2018 07:01  -  22 May 2018 07:00  --------------------------------------------------------  IN: 225 mL / OUT: 0 mL / NET: 225 mL    22 May 2018 07:01  -  22 May 2018 17:15  --------------------------------------------------------  IN: 120 mL / OUT: 0 mL / NET: 120 mL        PHYSICAL EXAM  GENERAL: NAD, well-developed, lethargic  HEAD:  Atraumatic, Normocephalic  EYES: EOMI, PERRLA, conjunctiva and sclera clear  NECK: Supple, No JVD  CHEST/LUNG: dec BS at bases, no tachypnea  HEART: irregularly irregular  ABDOMEN: Soft, Nondistended, mild generalized tenderness, no rebound tenderness; Bowel sounds present. Nontender ~5cm firm region inferior to umbilicus  EXTREMITIES:  2+ Peripheral Pulses, No clubbing, cyanosis, or edema. R ankle no edema/erythema.  NEURO/PSYCH: lethargic, nonfocal  SKIN: No rashes or lesions      LABS:  AM labs pending                         10.5   18.6  )-----------( 239      ( 22 May 2018 23:34 )             32.3     CBC Full  -  ( 22 May 2018 23:34 )  WBC Count : 18.6 K/uL  Hemoglobin : 10.5 g/dL  Hematocrit : 32.3 %  Platelet Count - Automated : 239 K/uL  Mean Cell Volume : 92.7 fl  Mean Cell Hemoglobin : 30.1 pg  Mean Cell Hemoglobin Concentration : 32.5 gm/dL  Auto Neutrophil # : x  Auto Lymphocyte # : x  Auto Monocyte # : x  Auto Eosinophil # : x  Auto Basophil # : x  Auto Neutrophil % : x  Auto Lymphocyte % : x  Auto Monocyte % : x  Auto Eosinophil % : x  Auto Basophil % : x      MICROBIOLOGY:  Culture - Blood (collected 20 May 2018 21:42)  Source: .Blood Blood  Preliminary Report (21 May 2018 22:01):    No growth to date.    Culture - Blood (collected 20 May 2018 21:42)  Source: .Blood Blood  Preliminary Report (21 May 2018 22:01):    No growth to date.      MICROBIOLOGY:    Culture - Blood (collected 20 May 2018 21:42)  Source: .Blood Blood  Preliminary Report (21 May 2018 22:01):    No growth to date.    Culture - Blood (collected 20 May 2018 21:42)  Source: .Blood Blood  Preliminary Report (21 May 2018 22:01):    No growth to date.      RADIOLOGY & ADDITIONAL TESTS:  < from: CT Abdomen and Pelvis w/ IV Cont (05.20.18 @ 20:03) >  IMPRESSION:     Small to moderate and moderate to large left pleural effusion with   compressive atelectasis. Recommend clinical correlation to assess   underlying pneumonia.    Decreased size of the left renal fossa/retroperitoneal mass since   1/31/2017. Evidence of fistulization between the mass and the adjacent   duodenum and descending colon as described.    Indeterminate hypodense focus in theright hepatic lobe.   Neoplasm/metastasis cannot be excluded. This can be further characterized   on a nonemergent contrast enhanced MRI.    Endometrial thickening. Neoplasm cannot be excluded in a postmenopausal   patient. Recommend clinical correlation and follow-up.    Additional findings as described.  < end of copied text >       CONSULTS: GI Internal Medicine Progress Note    Claire De Paz, PGY1  Internal Medicine, team 1  Pager [phone] / [phone]  After 7PM on weekdays and 12PM on weekends, please page #[phone]    Patient is a 73y old  Female who presents with a chief complaint of Lethargy, abd pain (21 May 2018 01:47)      SUBJECTIVE / OVERNIGHT EVENTS: 2 BM overnight, no report or blood or melena from nurse. Endorsing abdominal pain. Pt endorsing right foot pain, unable to say where in foot or ankle pain is. Right pedal pulse diminished but palpable and found with doppler.    MEDICATIONS  (STANDING):  atorvastatin 40 milliGRAM(s) Oral at bedtime  digoxin     Tablet 0.125 milliGRAM(s) Oral daily  docusate sodium 100 milliGRAM(s) Oral two times a day  levothyroxine 75 MICROGram(s) Oral daily  melatonin 3 milliGRAM(s) Oral at bedtime  metoprolol tartrate 25 milliGRAM(s) Oral two times a day  mirtazapine 7.5 milliGRAM(s) Oral daily  pantoprazole  Injectable 40 milliGRAM(s) IV Push every 12 hours  piperacillin/tazobactam IVPB. 3.375 Gram(s) IV Intermittent every 8 hours  polyethylene glycol 3350 17 Gram(s) Oral daily  senna 2 Tablet(s) Oral at bedtime  sodium chloride 0.9%. 1000 milliLiter(s) (75 mL/Hr) IV Continuous <Continuous>    MEDICATIONS  (PRN):  acetaminophen   Tablet. 650 milliGRAM(s) Oral every 6 hours PRN Mild Pain (1 - 3)      Vital Signs Last 24 Hrs  T(C): 36.5 (23 May 2018 12:11), Max: 36.7 (23 May 2018 00:24)  T(F): 97.7 (23 May 2018 12:11), Max: 98.1 (23 May 2018 00:24)  HR: 100 (23 May 2018 12:11) (92 - 112)  BP: 103/73 (23 May 2018 12:11) (91/65 - 121/85)  BP(mean): --  RR: 16 (23 May 2018 12:11) (16 - 19)  SpO2: 95% (23 May 2018 12:11) (95% - 100%)    CAPILLARY BLOOD GLUCOSE          I&O's Summary    22 May 2018 07:01  -  23 May 2018 07:00  --------------------------------------------------------  IN: 180 mL / OUT: 0 mL / NET: 180 mL    23 May 2018 07:01  -  23 May 2018 16:01  --------------------------------------------------------  IN: 220 mL / OUT: 0 mL / NET: 220 mL      PHYSICAL EXAM  GENERAL: NAD,  more alert  HEAD:  Atraumatic, temporal wasting  EYES: EOMI, PERRLA, conjunctiva and sclera clear  NECK: Supple, No JVD  CHEST/LUNG: dec BS at bases, no tachypnea  HEART: irregularly irregular  ABDOMEN: Soft, mildly distended and firmer, mild generalized tenderness, no rebound tenderness; Bowel sounds present.  EXTREMITIES:  1+ Peripheral Pulses by palpation and present by doppler, No clubbing, cyanosis, or edema. R toes cooler to touch and erythematous  NEURO/PSYCH: nonfocal, more alert, mild dysarthria  SKIN: No rashes or lesions      LABS:                        10.2   17.80 )-----------( 260      ( 23 May 2018 15:28 )             31.1     CBC Full  -  ( 23 May 2018 15:28 )  WBC Count : 17.80 K/uL  Hemoglobin : 10.2 g/dL  Hematocrit : 31.1 %  Platelet Count - Automated : 260 K/uL  Mean Cell Volume : 86.9 fl  Mean Cell Hemoglobin : 28.5 pg  Mean Cell Hemoglobin Concentration : 32.8 gm/dL  Auto Neutrophil # : x  Auto Lymphocyte # : x  Auto Monocyte # : x  Auto Eosinophil # : x  Auto Basophil # : x  Auto Neutrophil % : x  Auto Lymphocyte % : x  Auto Monocyte % : x  Auto Eosinophil % : x  Auto Basophil % : x    05-23    138  |  105  |  19  ----------------------------<  67<L>  3.5   |  21<L>  |  0.84    Ca    7.6<L>      23 May 2018 12:48  Phos  3.1     05-22  Mg     1.8     05-22    TPro  5.2<L>  /  Alb  1.9<L>  /  TBili  0.7  /  DBili  x   /  AST  6<L>  /  ALT  5<L>  /  AlkPhos  94  05-23    Creatinine Trend: 0.84<--, 0.82<--, 0.73<--, 0.75<--, 0.71<--  LIVER FUNCTIONS - ( 23 May 2018 12:48 )  Alb: 1.9 g/dL / Pro: 5.2 g/dL / ALK PHOS: 94 U/L / ALT: 5 U/L / AST: 6 U/L / GGT: x               MICROBIOLOGY:    Culture - Blood (collected 20 May 2018 21:42)  Source: .Blood Blood  Preliminary Report (21 May 2018 22:01):    No growth to date.    Culture - Blood (collected 20 May 2018 21:42)  Source: .Blood Blood  Preliminary Report (21 May 2018 22:01):    No growth to date.        RADIOLOGY & ADDITIONAL TESTS:  < from: CT Abdomen and Pelvis w/ IV Cont (05.20.18 @ 20:03) >  IMPRESSION:     Small to moderate and moderate to large left pleural effusion with   compressive atelectasis. Recommend clinical correlation to assess   underlying pneumonia.    Decreased size of the left renal fossa/retroperitoneal mass since   1/31/2017. Evidence of fistulization between the mass and the adjacent   duodenum and descending colon as described.    Indeterminate hypodense focus in theright hepatic lobe.   Neoplasm/metastasis cannot be excluded. This can be further characterized   on a nonemergent contrast enhanced MRI.    Endometrial thickening. Neoplasm cannot be excluded in a postmenopausal   patient. Recommend clinical correlation and follow-up.    Additional findings as described.  < end of copied text >       CONSULTS: GI

## 2018-05-24 LAB
ANION GAP SERPL CALC-SCNC: 13 MMOL/L — SIGNIFICANT CHANGE UP (ref 5–17)
BASOPHILS # BLD AUTO: 0 K/UL — SIGNIFICANT CHANGE UP (ref 0–0.2)
BASOPHILS NFR BLD AUTO: 0 % — SIGNIFICANT CHANGE UP (ref 0–2)
BLD GP AB SCN SERPL QL: NEGATIVE — SIGNIFICANT CHANGE UP
BUN SERPL-MCNC: 18 MG/DL — SIGNIFICANT CHANGE UP (ref 7–23)
CALCIUM SERPL-MCNC: 7.1 MG/DL — LOW (ref 8.4–10.5)
CHLORIDE SERPL-SCNC: 108 MMOL/L — SIGNIFICANT CHANGE UP (ref 96–108)
CO2 SERPL-SCNC: 20 MMOL/L — LOW (ref 22–31)
CREAT SERPL-MCNC: 0.89 MG/DL — SIGNIFICANT CHANGE UP (ref 0.5–1.3)
EOSINOPHIL # BLD AUTO: 0.15 K/UL — SIGNIFICANT CHANGE UP (ref 0–0.5)
EOSINOPHIL NFR BLD AUTO: 0.9 % — SIGNIFICANT CHANGE UP (ref 0–6)
GLUCOSE SERPL-MCNC: 74 MG/DL — SIGNIFICANT CHANGE UP (ref 70–99)
HCT VFR BLD CALC: 32.5 % — LOW (ref 34.5–45)
HGB BLD-MCNC: 10.5 G/DL — LOW (ref 11.5–15.5)
INR BLD: 1.39 RATIO — HIGH (ref 0.88–1.16)
LYMPHOCYTES # BLD AUTO: 0.45 K/UL — LOW (ref 1–3.3)
LYMPHOCYTES # BLD AUTO: 2.7 % — LOW (ref 13–44)
MAGNESIUM SERPL-MCNC: 1.8 MG/DL — SIGNIFICANT CHANGE UP (ref 1.6–2.6)
MCHC RBC-ENTMCNC: 28.3 PG — SIGNIFICANT CHANGE UP (ref 27–34)
MCHC RBC-ENTMCNC: 32.3 GM/DL — SIGNIFICANT CHANGE UP (ref 32–36)
MCV RBC AUTO: 87.6 FL — SIGNIFICANT CHANGE UP (ref 80–100)
MONOCYTES # BLD AUTO: 0.45 K/UL — SIGNIFICANT CHANGE UP (ref 0–0.9)
MONOCYTES NFR BLD AUTO: 2.7 % — SIGNIFICANT CHANGE UP (ref 2–14)
NEUTROPHILS # BLD AUTO: 15.56 K/UL — HIGH (ref 1.8–7.4)
NEUTROPHILS NFR BLD AUTO: 93.7 % — HIGH (ref 43–77)
PHOSPHATE SERPL-MCNC: 3.2 MG/DL — SIGNIFICANT CHANGE UP (ref 2.5–4.5)
PLATELET # BLD AUTO: 272 K/UL — SIGNIFICANT CHANGE UP (ref 150–400)
POTASSIUM SERPL-MCNC: 3.5 MMOL/L — SIGNIFICANT CHANGE UP (ref 3.5–5.3)
POTASSIUM SERPL-SCNC: 3.5 MMOL/L — SIGNIFICANT CHANGE UP (ref 3.5–5.3)
PROTHROM AB SERPL-ACNC: 15.8 SEC — HIGH (ref 10–13.1)
RBC # BLD: 3.71 M/UL — LOW (ref 3.8–5.2)
RBC # FLD: 21.7 % — HIGH (ref 10.3–14.5)
RH IG SCN BLD-IMP: POSITIVE — SIGNIFICANT CHANGE UP
SODIUM SERPL-SCNC: 141 MMOL/L — SIGNIFICANT CHANGE UP (ref 135–145)
WBC # BLD: 16.61 K/UL — HIGH (ref 3.8–10.5)
WBC # FLD AUTO: 16.61 K/UL — HIGH (ref 3.8–10.5)

## 2018-05-24 PROCEDURE — 99233 SBSQ HOSP IP/OBS HIGH 50: CPT | Mod: GC

## 2018-05-24 RX ORDER — PANTOPRAZOLE SODIUM 20 MG/1
40 TABLET, DELAYED RELEASE ORAL DAILY
Qty: 0 | Refills: 0 | Status: DISCONTINUED | OUTPATIENT
Start: 2018-05-24 | End: 2018-05-31

## 2018-05-24 RX ORDER — CALCIUM CARBONATE 500(1250)
1 TABLET ORAL ONCE
Qty: 0 | Refills: 0 | Status: DISCONTINUED | OUTPATIENT
Start: 2018-05-24 | End: 2018-06-07

## 2018-05-24 RX ORDER — PANTOPRAZOLE SODIUM 20 MG/1
40 TABLET, DELAYED RELEASE ORAL
Qty: 0 | Refills: 0 | Status: DISCONTINUED | OUTPATIENT
Start: 2018-05-24 | End: 2018-05-24

## 2018-05-24 RX ADMIN — Medication 3 MILLIGRAM(S): at 20:31

## 2018-05-24 RX ADMIN — Medication 25 MILLIGRAM(S): at 18:42

## 2018-05-24 RX ADMIN — Medication 650 MILLIGRAM(S): at 13:46

## 2018-05-24 RX ADMIN — Medication 650 MILLIGRAM(S): at 02:09

## 2018-05-24 RX ADMIN — Medication 650 MILLIGRAM(S): at 06:22

## 2018-05-24 RX ADMIN — PIPERACILLIN AND TAZOBACTAM 25 GRAM(S): 4; .5 INJECTION, POWDER, LYOPHILIZED, FOR SOLUTION INTRAVENOUS at 15:07

## 2018-05-24 RX ADMIN — PIPERACILLIN AND TAZOBACTAM 25 GRAM(S): 4; .5 INJECTION, POWDER, LYOPHILIZED, FOR SOLUTION INTRAVENOUS at 23:01

## 2018-05-24 RX ADMIN — Medication 75 MICROGRAM(S): at 06:11

## 2018-05-24 RX ADMIN — Medication 0.12 MILLIGRAM(S): at 06:11

## 2018-05-24 RX ADMIN — Medication 25 MILLIGRAM(S): at 06:11

## 2018-05-24 RX ADMIN — PANTOPRAZOLE SODIUM 40 MILLIGRAM(S): 20 TABLET, DELAYED RELEASE ORAL at 06:11

## 2018-05-24 RX ADMIN — Medication 650 MILLIGRAM(S): at 14:46

## 2018-05-24 RX ADMIN — PIPERACILLIN AND TAZOBACTAM 25 GRAM(S): 4; .5 INJECTION, POWDER, LYOPHILIZED, FOR SOLUTION INTRAVENOUS at 06:11

## 2018-05-24 NOTE — PROGRESS NOTE ADULT - PROBLEM SELECTOR PLAN 7
Home regimen synthroid 75 mcg daily per pharmacy. Per daughter, pt reported she wasn't taking her medications the 2 weeks prior to discharge because she felt too sick.  -continue Synthyroid 75 mcg daily as pt was not being treated. Will need repeat TSH in 2 weeks  -TSH significantly elevated 31.9. Tree T4 and T3 low Home regimen synthroid 75 mcg daily per pharmacy. Per daughter, pt reported she wasn't taking her medications the 2 weeks prior to discharge because she felt too sick.  -TSH significantly elevated 31.9. Tree T4 and T3 low  -continue Synthyroid 75 mcg daily as pt was not being treated. Will need repeat TSH in 2 weeks

## 2018-05-24 NOTE — PROGRESS NOTE ADULT - ATTENDING COMMENTS
I have edited the above note where appropriate. Hgb now stabilized after transfusion, no active bleeding or further diarrhea at this time. Having regular BMs.  No plans from GI to scope. Procalcitonin indeterminate, cont IV abx at this time to broadly cover for infectious etiologies including pneumonia and GI infection. however, no source if obvious. Leukocytosis is improving on antibiotics. Will complete 5 days total (ending 5/25).   Afib with rvr now improved s/p dig load. Continue 0.125mcg daily. Continue metoprolol.  lethargy suspected 2/2 opioids is improved. Off oxycodone. abdominal pain improved.  D/C IVF today and titrate off O2.   Cont synthroid, outpatient TFTs  no plans for further treatment of sarcoma at this time per outside oncology 2/2 poor performance status. may consider palliative radiation.   Right foot pain- poorly palpable peripheral pulses compared to LLE. Some discoloration to toes. Ordered for NADIRA to assess and pt refused. family is aware. pt aaox3 with capacity.  GOC have been discussed with both sons, pt is DNR, not DNI. however pt is aaox3 and when attempting to discuss goc she is declining discussion. awaiting family bringing in living will.  dispo-- planning for [facility] will discuss with case management.

## 2018-05-24 NOTE — PROGRESS NOTE ADULT - SUBJECTIVE AND OBJECTIVE BOX
Internal Medicine Progress Note    Claire De Paz, PGY1  Internal Medicine, team 1  Pager [phone] / [phone]  After 7PM on weekdays and 12PM on weekends, please page #[phone]    Patient is a 73y old  Female who presents with a chief complaint of Lethargy, abd pain (21 May 2018 01:47)      SUBJECTIVE / OVERNIGHT EVENTS:     MEDICATIONS  (STANDING):  atorvastatin 40 milliGRAM(s) Oral at bedtime  digoxin     Tablet 0.125 milliGRAM(s) Oral daily  docusate sodium 100 milliGRAM(s) Oral two times a day  levothyroxine 75 MICROGram(s) Oral daily  melatonin 3 milliGRAM(s) Oral at bedtime  metoprolol tartrate 25 milliGRAM(s) Oral two times a day  mirtazapine 7.5 milliGRAM(s) Oral daily  pantoprazole  Injectable 40 milliGRAM(s) IV Push every 12 hours  piperacillin/tazobactam IVPB. 3.375 Gram(s) IV Intermittent every 8 hours  polyethylene glycol 3350 17 Gram(s) Oral daily  senna 2 Tablet(s) Oral at bedtime  sodium chloride 0.9%. 1000 milliLiter(s) (75 mL/Hr) IV Continuous <Continuous>    MEDICATIONS  (PRN):  acetaminophen   Tablet. 650 milliGRAM(s) Oral every 6 hours PRN Mild Pain (1 - 3)      Vital Signs Last 24 Hrs  T(C): 36.3 (24 May 2018 05:43), Max: 36.8 (23 May 2018 16:41)  T(F): 97.3 (24 May 2018 05:43), Max: 98.2 (23 May 2018 16:41)  HR: 94 (24 May 2018 06:16) (82 - 103)  BP: 107/75 (24 May 2018 06:16) (81/53 - 122/76)  BP(mean): --  RR: 18 (24 May 2018 05:43) (16 - 18)  SpO2: 97% (24 May 2018 05:43) (95% - 98%)    CAPILLARY BLOOD GLUCOSE          I&O's Summary    23 May 2018 07:01  -  24 May 2018 07:00  --------------------------------------------------------  IN: 320 mL / OUT: 100 mL / NET: 220 mL      PHYSICAL EXAM  GENERAL: NAD,  more alert  HEAD:  Atraumatic, temporal wasting  EYES: EOMI, PERRLA, conjunctiva and sclera clear  NECK: Supple, No JVD  CHEST/LUNG: dec BS at bases, no tachypnea  HEART: irregularly irregular  ABDOMEN: Soft, mildly distended and firmer, mild generalized tenderness, no rebound tenderness; Bowel sounds present.  EXTREMITIES:  1+ Peripheral Pulses by palpation and present by doppler, No clubbing, cyanosis, or edema. R toes cooler to touch and erythematous  NEURO/PSYCH: nonfocal, more alert, mild dysarthria  SKIN: No rashes or lesions      LABS:                        10.2   17.80 )-----------( 260      ( 23 May 2018 15:28 )             31.1     CBC Full  -  ( 23 May 2018 15:28 )  WBC Count : 17.80 K/uL  Hemoglobin : 10.2 g/dL  Hematocrit : 31.1 %  Platelet Count - Automated : 260 K/uL  Mean Cell Volume : 86.9 fl  Mean Cell Hemoglobin : 28.5 pg  Mean Cell Hemoglobin Concentration : 32.8 gm/dL  Auto Neutrophil # : x  Auto Lymphocyte # : x  Auto Monocyte # : x  Auto Eosinophil # : x  Auto Basophil # : x  Auto Neutrophil % : x  Auto Lymphocyte % : x  Auto Monocyte % : x  Auto Eosinophil % : x  Auto Basophil % : x    05-23    138  |  105  |  19  ----------------------------<  67<L>  3.5   |  21<L>  |  0.84    Ca    7.6<L>      23 May 2018 12:48  Phos  3.1     05-22  Mg     1.8     05-22    TPro  5.2<L>  /  Alb  1.9<L>  /  TBili  0.7  /  DBili  x   /  AST  6<L>  /  ALT  5<L>  /  AlkPhos  94  05-23    Creatinine Trend: 0.84<--, 0.82<--, 0.73<--, 0.75<--, 0.71<--  LIVER FUNCTIONS - ( 23 May 2018 12:48 )  Alb: 1.9 g/dL / Pro: 5.2 g/dL / ALK PHOS: 94 U/L / ALT: 5 U/L / AST: 6 U/L / GGT: x             MICROBIOLOGY:  BCx ng    RADIOLOGY & ADDITIONAL TESTS:  < from: CT Abdomen and Pelvis w/ IV Cont (05.20.18 @ 20:03) >  IMPRESSION:     Small to moderate and moderate to large left pleural effusion with   compressive atelectasis. Recommend clinical correlation to assess   underlying pneumonia.    Decreased size of the left renal fossa/retroperitoneal mass since   1/31/2017. Evidence of fistulization between the mass and the adjacent   duodenum and descending colon as described.    Indeterminate hypodense focus in theright hepatic lobe.   Neoplasm/metastasis cannot be excluded. This can be further characterized   on a nonemergent contrast enhanced MRI.    Endometrial thickening. Neoplasm cannot be excluded in a postmenopausal   patient. Recommend clinical correlation and follow-up.    Additional findings as described.  < end of copied text >       CONSULTS: GI Internal Medicine Progress Note    Claire De Paz, PGY1  Internal Medicine, team 1  Pager [phone] / [phone]  After 7PM on weekdays and 12PM on weekends, please page #[phone]    Patient is a 73y old  Female who presents with a chief complaint of Lethargy, abd pain (21 May 2018 01:47)      SUBJECTIVE / OVERNIGHT EVENTS: Went down to get ABIs and pt refused.    MEDICATIONS  (STANDING):  atorvastatin 40 milliGRAM(s) Oral at bedtime  digoxin     Tablet 0.125 milliGRAM(s) Oral daily  docusate sodium 100 milliGRAM(s) Oral two times a day  levothyroxine 75 MICROGram(s) Oral daily  melatonin 3 milliGRAM(s) Oral at bedtime  metoprolol tartrate 25 milliGRAM(s) Oral two times a day  mirtazapine 7.5 milliGRAM(s) Oral daily  pantoprazole  Injectable 40 milliGRAM(s) IV Push every 12 hours  piperacillin/tazobactam IVPB. 3.375 Gram(s) IV Intermittent every 8 hours  polyethylene glycol 3350 17 Gram(s) Oral daily  senna 2 Tablet(s) Oral at bedtime  sodium chloride 0.9%. 1000 milliLiter(s) (75 mL/Hr) IV Continuous <Continuous>    MEDICATIONS  (PRN):  acetaminophen   Tablet. 650 milliGRAM(s) Oral every 6 hours PRN Mild Pain (1 - 3)      Vital Signs Last 24 Hrs  T(C): 36.3 (24 May 2018 05:43), Max: 36.8 (23 May 2018 16:41)  T(F): 97.3 (24 May 2018 05:43), Max: 98.2 (23 May 2018 16:41)  HR: 94 (24 May 2018 06:16) (82 - 103)  BP: 107/75 (24 May 2018 06:16) (81/53 - 122/76)  BP(mean): --  RR: 18 (24 May 2018 05:43) (16 - 18)  SpO2: 97% (24 May 2018 05:43) (95% - 98%)    CAPILLARY BLOOD GLUCOSE          I&O's Summary    23 May 2018 07:01  -  24 May 2018 07:00  --------------------------------------------------------  IN: 320 mL / OUT: 100 mL / NET: 220 mL      PHYSICAL EXAM  GENERAL: NAD,  more alert  HEAD:  Atraumatic, temporal wasting  EYES: EOMI, PERRLA, conjunctiva and sclera clear  NECK: Supple, No JVD  CHEST/LUNG: dec BS at bases, no tachypnea  HEART: irregularly irregular  ABDOMEN: Soft, mildly distended and firmer, mild generalized tenderness, no rebound tenderness; Bowel sounds present.  EXTREMITIES:  1+ Peripheral Pulses by palpation and present by doppler, No clubbing, cyanosis, or edema. R toes cooler to touch and erythematous  NEURO/PSYCH: nonfocal, more alert, mild dysarthria  SKIN: No rashes or lesions      LABS:                        10.2   17.80 )-----------( 260      ( 23 May 2018 15:28 )             31.1     CBC Full  -  ( 23 May 2018 15:28 )  WBC Count : 17.80 K/uL  Hemoglobin : 10.2 g/dL  Hematocrit : 31.1 %  Platelet Count - Automated : 260 K/uL  Mean Cell Volume : 86.9 fl  Mean Cell Hemoglobin : 28.5 pg  Mean Cell Hemoglobin Concentration : 32.8 gm/dL  Auto Neutrophil # : x  Auto Lymphocyte # : x  Auto Monocyte # : x  Auto Eosinophil # : x  Auto Basophil # : x  Auto Neutrophil % : x  Auto Lymphocyte % : x  Auto Monocyte % : x  Auto Eosinophil % : x  Auto Basophil % : x    05-23    138  |  105  |  19  ----------------------------<  67<L>  3.5   |  21<L>  |  0.84    Ca    7.6<L>      23 May 2018 12:48  Phos  3.1     05-22  Mg     1.8     05-22    TPro  5.2<L>  /  Alb  1.9<L>  /  TBili  0.7  /  DBili  x   /  AST  6<L>  /  ALT  5<L>  /  AlkPhos  94  05-23    Creatinine Trend: 0.84<--, 0.82<--, 0.73<--, 0.75<--, 0.71<--  LIVER FUNCTIONS - ( 23 May 2018 12:48 )  Alb: 1.9 g/dL / Pro: 5.2 g/dL / ALK PHOS: 94 U/L / ALT: 5 U/L / AST: 6 U/L / GGT: x             MICROBIOLOGY:  BCx ng    RADIOLOGY & ADDITIONAL TESTS:  < from: CT Abdomen and Pelvis w/ IV Cont (05.20.18 @ 20:03) >  IMPRESSION:     Small to moderate and moderate to large left pleural effusion with   compressive atelectasis. Recommend clinical correlation to assess   underlying pneumonia.    Decreased size of the left renal fossa/retroperitoneal mass since   1/31/2017. Evidence of fistulization between the mass and the adjacent   duodenum and descending colon as described.    Indeterminate hypodense focus in theright hepatic lobe.   Neoplasm/metastasis cannot be excluded. This can be further characterized   on a nonemergent contrast enhanced MRI.    Endometrial thickening. Neoplasm cannot be excluded in a postmenopausal   patient. Recommend clinical correlation and follow-up.    Additional findings as described.  < end of copied text >       CONSULTS: GI Internal Medicine Progress Note    Claire De Paz, PGY1  Internal Medicine, team 1  Pager [phone] / [phone]  After 7PM on weekdays and 12PM on weekends, please page [phone]    Patient is a 73y old  Female who presents with a chief complaint of Lethargy, abd pain (21 May 2018 01:47)      SUBJECTIVE / OVERNIGHT EVENTS: Went down to get ABIs yesterday and pt refused study. BP 81/53 overnight, pt asymptomatic. Improved to 106/67 with 500cc IVF. Pt had 2 normal BM overnight. Tele AF 70s-120 with PVCs overnight. Pt is incontinent. Attempts of straight cath for urine sample yesterday and today unsuccessful due to swollen labia decreasing visualization. Today pt says she feels fine. Endorsing abd pain and right foot pain. Denies SOB, CP, F/C.    MEDICATIONS  (STANDING):  atorvastatin 40 milliGRAM(s) Oral at bedtime  digoxin     Tablet 0.125 milliGRAM(s) Oral daily  docusate sodium 100 milliGRAM(s) Oral two times a day  levothyroxine 75 MICROGram(s) Oral daily  melatonin 3 milliGRAM(s) Oral at bedtime  metoprolol tartrate 25 milliGRAM(s) Oral two times a day  mirtazapine 7.5 milliGRAM(s) Oral daily  pantoprazole  Injectable 40 milliGRAM(s) IV Push every 12 hours  piperacillin/tazobactam IVPB. 3.375 Gram(s) IV Intermittent every 8 hours  polyethylene glycol 3350 17 Gram(s) Oral daily  senna 2 Tablet(s) Oral at bedtime  sodium chloride 0.9%. 1000 milliLiter(s) (75 mL/Hr) IV Continuous <Continuous>    MEDICATIONS  (PRN):  acetaminophen   Tablet. 650 milliGRAM(s) Oral every 6 hours PRN Mild Pain (1 - 3)      Vital Signs Last 24 Hrs  T(C): 36.3 (24 May 2018 05:43), Max: 36.8 (23 May 2018 16:41)  T(F): 97.3 (24 May 2018 05:43), Max: 98.2 (23 May 2018 16:41)  HR: 94 (24 May 2018 06:16) (82 - 103)  BP: 107/75 (24 May 2018 06:16) (81/53 - 122/76)  BP(mean): --  RR: 18 (24 May 2018 05:43) (16 - 18)  SpO2: 97% (24 May 2018 05:43) (95% - 98%)    CAPILLARY BLOOD GLUCOSE          I&O's Summary    23 May 2018 07:01  -  24 May 2018 07:00  --------------------------------------------------------  IN: 320 mL / OUT: 100 mL / NET: 220 mL      PHYSICAL EXAM  GENERAL: NAD,  more alert  HEAD:  Atraumatic, temporal wasting  EYES: EOMI, PERRLA, conjunctiva and sclera clear  NECK: Supple, No JVD  CHEST/LUNG: dec BS at bases, no tachypnea, clear to auscultation  HEART: irregularly irregular  ABDOMEN: Soft, mildly distended, mild generalized tenderness, no rebound tenderness; Bowel sounds present.  EXTREMITIES:  1+ Peripheral Pulses by palpation and present by doppler, No clubbing, cyanosis, or edema. Toes sample temperature to touch, right toes less erythematous.  NEURO/PSYCH: more alert, oriented x 4, mild aphasia otherwise nonfocal  SKIN: No rashes or lesions      LABS:                         10.5   16.61 )-----------( 272      ( 24 May 2018 09:34 )             32.5     CBC Full  -  ( 24 May 2018 09:34 )  WBC Count : 16.61 K/uL  Hemoglobin : 10.5 g/dL  Hematocrit : 32.5 %  Platelet Count - Automated : 272 K/uL  Mean Cell Volume : 87.6 fl  Mean Cell Hemoglobin : 28.3 pg  Mean Cell Hemoglobin Concentration : 32.3 gm/dL  Auto Neutrophil # : x  Auto Lymphocyte # : x  Auto Monocyte # : x  Auto Eosinophil # : x  Auto Basophil # : x  Auto Neutrophil % : x  Auto Lymphocyte % : x  Auto Monocyte % : x  Auto Eosinophil % : x  Auto Basophil % : x    05-24    141  |  108  |  18  ----------------------------<  74  3.5   |  20<L>  |  0.89    Ca    7.1<L>      24 May 2018 07:13  Phos  3.2     05-24  Mg     1.8     05-24    TPro  5.2<L>  /  Alb  1.9<L>  /  TBili  0.7  /  DBili  x   /  AST  6<L>  /  ALT  5<L>  /  AlkPhos  94  05-23    Creatinine Trend: 0.89<--, 0.84<--, 0.82<--, 0.73<--, 0.75<--, 0.71<--  LIVER FUNCTIONS - ( 23 May 2018 12:48 )  Alb: 1.9 g/dL / Pro: 5.2 g/dL / ALK PHOS: 94 U/L / ALT: 5 U/L / AST: 6 U/L / GGT: x           PT/INR - ( 24 May 2018 09:32 )   PT: 15.8 sec;   INR: 1.39 ratio          MICROBIOLOGY:  BCx ng    RADIOLOGY & ADDITIONAL TESTS:  < from: CT Abdomen and Pelvis w/ IV Cont (05.20.18 @ 20:03) >  IMPRESSION:     Small to moderate and moderate to large left pleural effusion with   compressive atelectasis. Recommend clinical correlation to assess   underlying pneumonia.    Decreased size of the left renal fossa/retroperitoneal mass since   1/31/2017. Evidence of fistulization between the mass and the adjacent   duodenum and descending colon as described.    Indeterminate hypodense focus in theright hepatic lobe.   Neoplasm/metastasis cannot be excluded. This can be further characterized   on a nonemergent contrast enhanced MRI.    Endometrial thickening. Neoplasm cannot be excluded in a postmenopausal   patient. Recommend clinical correlation and follow-up.    Additional findings as described.  < end of copied text >       CONSULTS: GI Internal Medicine Progress Note    Claire De Paz, PGY1  Internal Medicine, team 1  Pager [phone]  After 7PM on weekdays and 12PM on weekends, please page [phone]    Patient is a 73y old  Female who presents with a chief complaint of Lethargy, abd pain (21 May 2018 01:47)      SUBJECTIVE / OVERNIGHT EVENTS: Went down to get ABIs yesterday and pt refused study. BP 81/53 overnight, pt asymptomatic. Improved to 106/67 with 500cc IVF. Pt had 2 normal BM overnight. Tele AF 70s-120 with PVCs overnight. Pt is incontinent. Attempts of straight cath for urine sample yesterday and today unsuccessful due to swollen labia decreasing visualization. Today pt says she feels fine. Endorsing abd pain and right foot pain. Denies SOB, CP, F/C.    MEDICATIONS  (STANDING):  atorvastatin 40 milliGRAM(s) Oral at bedtime  digoxin     Tablet 0.125 milliGRAM(s) Oral daily  docusate sodium 100 milliGRAM(s) Oral two times a day  levothyroxine 75 MICROGram(s) Oral daily  melatonin 3 milliGRAM(s) Oral at bedtime  metoprolol tartrate 25 milliGRAM(s) Oral two times a day  mirtazapine 7.5 milliGRAM(s) Oral daily  pantoprazole  Injectable 40 milliGRAM(s) IV Push every 12 hours  piperacillin/tazobactam IVPB. 3.375 Gram(s) IV Intermittent every 8 hours  polyethylene glycol 3350 17 Gram(s) Oral daily  senna 2 Tablet(s) Oral at bedtime  sodium chloride 0.9%. 1000 milliLiter(s) (75 mL/Hr) IV Continuous <Continuous>    MEDICATIONS  (PRN):  acetaminophen   Tablet. 650 milliGRAM(s) Oral every 6 hours PRN Mild Pain (1 - 3)      Vital Signs Last 24 Hrs  T(C): 36.3 (24 May 2018 05:43), Max: 36.8 (23 May 2018 16:41)  T(F): 97.3 (24 May 2018 05:43), Max: 98.2 (23 May 2018 16:41)  HR: 94 (24 May 2018 06:16) (82 - 103)  BP: 107/75 (24 May 2018 06:16) (81/53 - 122/76)  BP(mean): --  RR: 18 (24 May 2018 05:43) (16 - 18)  SpO2: 97% (24 May 2018 05:43) (95% - 98%)    CAPILLARY BLOOD GLUCOSE          I&O's Summary    23 May 2018 07:01  -  24 May 2018 07:00  --------------------------------------------------------  IN: 320 mL / OUT: 100 mL / NET: 220 mL      PHYSICAL EXAM  GENERAL: NAD,  more alert  HEAD:  Atraumatic, temporal wasting  EYES: EOMI, PERRLA, conjunctiva and sclera clear  NECK: Supple, No JVD  CHEST/LUNG: dec BS at bases, no tachypnea, clear to auscultation  HEART: irregularly irregular  ABDOMEN: Soft, mildly distended, mild generalized tenderness, no rebound tenderness; Bowel sounds present.  EXTREMITIES:  1+ Peripheral Pulses by palpation and present by doppler, No clubbing, cyanosis, or edema. Toes sample temperature to touch, right toes less erythematous.  NEURO/PSYCH: more alert, oriented x 4, mild aphasia otherwise nonfocal, moves right toes  SKIN: No rashes or lesions      LABS:                         10.5   16.61 )-----------( 272      ( 24 May 2018 09:34 )             32.5     CBC Full  -  ( 24 May 2018 09:34 )  WBC Count : 16.61 K/uL  Hemoglobin : 10.5 g/dL  Hematocrit : 32.5 %  Platelet Count - Automated : 272 K/uL  Mean Cell Volume : 87.6 fl  Mean Cell Hemoglobin : 28.3 pg  Mean Cell Hemoglobin Concentration : 32.3 gm/dL  Auto Neutrophil # : x  Auto Lymphocyte # : x  Auto Monocyte # : x  Auto Eosinophil # : x  Auto Basophil # : x  Auto Neutrophil % : x  Auto Lymphocyte % : x  Auto Monocyte % : x  Auto Eosinophil % : x  Auto Basophil % : x    05-24    141  |  108  |  18  ----------------------------<  74  3.5   |  20<L>  |  0.89    Ca    7.1<L>      24 May 2018 07:13  Phos  3.2     05-24  Mg     1.8     05-24    TPro  5.2<L>  /  Alb  1.9<L>  /  TBili  0.7  /  DBili  x   /  AST  6<L>  /  ALT  5<L>  /  AlkPhos  94  05-23    Creatinine Trend: 0.89<--, 0.84<--, 0.82<--, 0.73<--, 0.75<--, 0.71<--  LIVER FUNCTIONS - ( 23 May 2018 12:48 )  Alb: 1.9 g/dL / Pro: 5.2 g/dL / ALK PHOS: 94 U/L / ALT: 5 U/L / AST: 6 U/L / GGT: x           PT/INR - ( 24 May 2018 09:32 )   PT: 15.8 sec;   INR: 1.39 ratio          MICROBIOLOGY:  BCx ng    RADIOLOGY & ADDITIONAL TESTS:  < from: CT Abdomen and Pelvis w/ IV Cont (05.20.18 @ 20:03) >  IMPRESSION:     Small to moderate and moderate to large left pleural effusion with   compressive atelectasis. Recommend clinical correlation to assess   underlying pneumonia.    Decreased size of the left renal fossa/retroperitoneal mass since   1/31/2017. Evidence of fistulization between the mass and the adjacent   duodenum and descending colon as described.    Indeterminate hypodense focus in theright hepatic lobe.   Neoplasm/metastasis cannot be excluded. This can be further characterized   on a nonemergent contrast enhanced MRI.    Endometrial thickening. Neoplasm cannot be excluded in a postmenopausal   patient. Recommend clinical correlation and follow-up.    Additional findings as described.  < end of copied text >       CONSULTS: GI Internal Medicine Progress Note    Claire De Paz, PGY1  Internal Medicine, team 1  Pager [phone] / [phone]  After 7PM on weekdays and 12PM on weekends, please page #[phone]    Patient is a 73y old  Female who presents with a chief complaint of Lethargy, abd pain (21 May 2018 01:47)      SUBJECTIVE / OVERNIGHT EVENTS: Went down to get ABIs yesterday and pt refused study. BP 81/53 overnight, pt asymptomatic. Improved to 106/67 with 500cc IVF. Pt had 2 normal BM overnight. Tele AF 70s-120 with PVCs overnight. Pt is incontinent. Attempts of straight cath for urine sample yesterday and today unsuccessful due to swollen labia decreasing visualization. Today pt says she feels fine. Endorsing abd pain and right foot pain. Denies SOB, CP, F/C.    MEDICATIONS  (STANDING):  atorvastatin 40 milliGRAM(s) Oral at bedtime  digoxin     Tablet 0.125 milliGRAM(s) Oral daily  docusate sodium 100 milliGRAM(s) Oral two times a day  levothyroxine 75 MICROGram(s) Oral daily  melatonin 3 milliGRAM(s) Oral at bedtime  metoprolol tartrate 25 milliGRAM(s) Oral two times a day  mirtazapine 7.5 milliGRAM(s) Oral daily  pantoprazole  Injectable 40 milliGRAM(s) IV Push every 12 hours  piperacillin/tazobactam IVPB. 3.375 Gram(s) IV Intermittent every 8 hours  polyethylene glycol 3350 17 Gram(s) Oral daily  senna 2 Tablet(s) Oral at bedtime  sodium chloride 0.9%. 1000 milliLiter(s) (75 mL/Hr) IV Continuous <Continuous>    MEDICATIONS  (PRN):  acetaminophen   Tablet. 650 milliGRAM(s) Oral every 6 hours PRN Mild Pain (1 - 3)      Vital Signs Last 24 Hrs  T(C): 36.3 (24 May 2018 05:43), Max: 36.8 (23 May 2018 16:41)  T(F): 97.3 (24 May 2018 05:43), Max: 98.2 (23 May 2018 16:41)  HR: 94 (24 May 2018 06:16) (82 - 103)  BP: 107/75 (24 May 2018 06:16) (81/53 - 122/76)  BP(mean): --  RR: 18 (24 May 2018 05:43) (16 - 18)  SpO2: 97% (24 May 2018 05:43) (95% - 98%)    CAPILLARY BLOOD GLUCOSE          I&O's Summary    23 May 2018 07:01  -  24 May 2018 07:00  --------------------------------------------------------  IN: 320 mL / OUT: 100 mL / NET: 220 mL      PHYSICAL EXAM  GENERAL: NAD,  more alert  HEAD:  Atraumatic, temporal wasting  EYES: EOMI, PERRLA, conjunctiva and sclera clear  NECK: Supple, No JVD  CHEST/LUNG: dec BS at bases, no tachypnea, clear to auscultation  HEART: irregularly irregular  ABDOMEN: Soft, mildly distended, mild generalized tenderness, no rebound tenderness; Bowel sounds present.  EXTREMITIES:  1+ Peripheral Pulses by palpation and present by doppler, No clubbing, cyanosis, or edema. Toes sample temperature to touch, right toes less erythematous.  NEURO/PSYCH: more alert, oriented x 4, mild aphasia otherwise nonfocal, moves right toes  SKIN: No rashes or lesions      LABS:                         10.5   16.61 )-----------( 272      ( 24 May 2018 09:34 )             32.5     CBC Full  -  ( 24 May 2018 09:34 )  WBC Count : 16.61 K/uL  Hemoglobin : 10.5 g/dL  Hematocrit : 32.5 %  Platelet Count - Automated : 272 K/uL  Mean Cell Volume : 87.6 fl  Mean Cell Hemoglobin : 28.3 pg  Mean Cell Hemoglobin Concentration : 32.3 gm/dL  Auto Neutrophil # : x  Auto Lymphocyte # : x  Auto Monocyte # : x  Auto Eosinophil # : x  Auto Basophil # : x  Auto Neutrophil % : x  Auto Lymphocyte % : x  Auto Monocyte % : x  Auto Eosinophil % : x  Auto Basophil % : x    05-24    141  |  108  |  18  ----------------------------<  74  3.5   |  20<L>  |  0.89    Ca    7.1<L>      24 May 2018 07:13  Phos  3.2     05-24  Mg     1.8     05-24    TPro  5.2<L>  /  Alb  1.9<L>  /  TBili  0.7  /  DBili  x   /  AST  6<L>  /  ALT  5<L>  /  AlkPhos  94  05-23    Creatinine Trend: 0.89<--, 0.84<--, 0.82<--, 0.73<--, 0.75<--, 0.71<--  LIVER FUNCTIONS - ( 23 May 2018 12:48 )  Alb: 1.9 g/dL / Pro: 5.2 g/dL / ALK PHOS: 94 U/L / ALT: 5 U/L / AST: 6 U/L / GGT: x           PT/INR - ( 24 May 2018 09:32 )   PT: 15.8 sec;   INR: 1.39 ratio          MICROBIOLOGY:  BCx ng    RADIOLOGY & ADDITIONAL TESTS:  < from: CT Abdomen and Pelvis w/ IV Cont (05.20.18 @ 20:03) >  IMPRESSION:     Small to moderate and moderate to large left pleural effusion with   compressive atelectasis. Recommend clinical correlation to assess   underlying pneumonia.    Decreased size of the left renal fossa/retroperitoneal mass since   1/31/2017. Evidence of fistulization between the mass and the adjacent   duodenum and descending colon as described.    Indeterminate hypodense focus in theright hepatic lobe.   Neoplasm/metastasis cannot be excluded. This can be further characterized   on a nonemergent contrast enhanced MRI.    Endometrial thickening. Neoplasm cannot be excluded in a postmenopausal   patient. Recommend clinical correlation and follow-up.    Additional findings as described.  < end of copied text >    CXR: personally reviewed. Small bilateral pleural effusions  AXR: non obstructive bowel gas pattern. personally reviewed.       CONSULTS: GI

## 2018-05-24 NOTE — PROGRESS NOTE ADULT - PROBLEM SELECTOR PLAN 8
Home regimen lopressor 50mg BID  -continue loperssor 25 BID, monitor for hypotension  -monitor on telemetry, currently in AF rate 90s-120s  -s/p digoxin load, start dig 0.125mg daily  -no AC 2/2 bleeding risk Home regimen lopressor 50mg BID  -continue loperssor 25 BID, monitor for hypotension  -monitor on telemetry, currently in AF rate 70s-120  -continue dig 0.125mg daily  -no AC 2/2 bleeding risk

## 2018-05-24 NOTE — PROGRESS NOTE ADULT - PROBLEM SELECTOR PLAN 5
No current treatment this admission. discussed care with pts oncologist at [facility]. At this time no further treatment planned given poor performance status. ongoing [facility] discussions with son regarding dispo. She has been living with children prior to admission.  -Dr Smith at [facility] has organized for transfer of patient, however there are no available beds at this time No current treatment this admission. Discussed care with pts oncologist at [facility]. At this time no further treatment planned given poor performance status. ongoing GOC discussions with son regarding dispo. She has been living with children prior to admission.  -Dr Smith at [facility] has organized for transfer of patient, however there are no available beds at this time  -Will touch base with oncologist whether role for RT. It not, transfer to [facility] will be cancelled

## 2018-05-24 NOTE — PROGRESS NOTE ADULT - ASSESSMENT
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 Remains on broad spectrum antibiotics as the presence of underling infection unclear. 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 Remains on broad spectrum antibiotics as the presence of underling infection unclear.

## 2018-05-24 NOTE — PROGRESS NOTE ADULT - PROBLEM SELECTOR PLAN 10
[facility] readressed with son Dr. Rivera. He reports he and his brother both HCP (no paperwork at hand) and she has living will designating DNR. Asked son to bring in living will if possible. To be followed [facility] readressed with trace Rivera. Sons reportedly HCPs (will need to provide paperwork) and will bring in pt's living will which states she dose not want to she would not want to be intubated for a prolonged amount of time if there is no change of recovery. Pt is reportedly DNR at [facility]. However pt has capacity and she does not want to discuss her code status currently.

## 2018-05-24 NOTE — PROGRESS NOTE ADULT - PROBLEM SELECTOR PLAN 2
Pt reporting right foot pain. Right toes are erythematous and cool to touch. Right pedal pulse palpable and present on doppler.  -NADIRA LE bilateral  -consider vascular consult, but is likely poor candidate for intervention Pt reporting right foot pain. Right toes were are erythematous and cool to touch. Right pedal pulse palpable and present on doppler.  -Pt refused NADIRA bilateral LE study  -consider vascular consult, but is likely poor candidate for intervention

## 2018-05-24 NOTE — PROGRESS NOTE ADULT - PROBLEM SELECTOR PLAN 6
2/2 underlying sarcoma, duodenal masses with fistula, possible intraabdominal infection  -continue zosyn at this time to cover for intraabdominal infection  -will d/c opioids due to lethargy 2/2 underlying sarcoma, duodenal masses with fistula, possible intraabdominal infection  -continue zosyn at this time to cover for intraabdominal infection  -Avoid opioids due to mental status

## 2018-05-24 NOTE — PROGRESS NOTE ADULT - PROBLEM SELECTOR PLAN 4
Likely multifactorial: May be secondary to sepsis, acute GIB, AF RVR, decreased PO intake  -improving and stabilized today after receiving IVF, continue mIVF while limited PO intake  -cont to monitor closely Likely multifactorial: May be secondary to sepsis, acute GIB, AF RVR, decreased PO intake  -improving and stabilized today after receiving IVF and blood transfusion  -dependent edema, d/c IVF  -cont to monitor closely

## 2018-05-24 NOTE — PROGRESS NOTE ADULT - PROBLEM SELECTOR PLAN 3
Leukocytosis 28 on admission, now improving. Tachycardia on admission 2/2 afib with rvr. Unclear whether presence of SIRs is reactive 2/2 malignancy and blood loss vs infectious. LLL consolisation on CXR, clarified on CT as compressive atelectasis, but cannot r/o pneumonia. per son who is physician, pleural effusion is chronic and has been tapped in past. no other concerning signs for pneumonia at this time. other possible source of infection is intraabdominal.  -continue zosyn (5/21 - ), will complete 5-7 day course  -BCx no growth to date. Will straight cath for urine sample  -If has diarrhea, will check C diff  -Procalcitonin 0.46 indeterminate  -will consider ID consult Leukocytosis 28 on admission, now improving. Tachycardia on admission 2/2 afib with rvr. Unclear whether presence of SIRs is reactive 2/2 malignancy and blood loss vs infectious. Compressive atelectasis on CT, but cannot r/o pneumonia. Per son who is physician, pleural effusion is chronic and has been tapped in past. No other concerning signs for pneumonia at this time. Other possible source of infection is intraabdominal.  -continue zosyn (5/21 - ), will complete 5-day course, EOT 5/25  -BCx no growth to date. Attempt to obtain urine sample ongoing. Pt is incontinent and edema makes straight cath difficult  -If has diarrhea, will check C diff  -Procalcitonin 0.46 indeterminate

## 2018-05-24 NOTE — PROGRESS NOTE ADULT - PROBLEM SELECTOR PLAN 1
Likely GIB in the setting of duodenal mass which has bled before. Likely contributing to hypotension.  -switch to pantoprazole PO BID  -appreciate GI recs, no plan for scope unless has bloody BM  -hgb remains stabilized today after 2u pRBC  -monitor CBC daily, maintain active T&S  -progress diet as tolerated Likely GIB in the setting of duodenal mass which has bled before. Likely contributing to hypotension.  -pantoprazole PO BID  -appreciate GI recs, no plan for scope unless has bloody BM  -hgb remains stabilized above baseline Hg after 2u pRBC  -monitor CBC daily, maintain active T&S

## 2018-05-24 NOTE — PROGRESS NOTE ADULT - PROBLEM SELECTOR PLAN 9
DVT: No pharmacologic 2/2 GIB, SCDs  Diet: mechanical soft  Dispo: PT eval pending, transfer to [facility] in process DVT: No pharmacologic 2/2 GIB, SCDs  Diet: mechanical soft  Dispo: PT eval pending, transfer to [facility] vs discharge after antibiotic course

## 2018-05-25 LAB
ANION GAP SERPL CALC-SCNC: 14 MMOL/L — SIGNIFICANT CHANGE UP (ref 5–17)
BUN SERPL-MCNC: 19 MG/DL — SIGNIFICANT CHANGE UP (ref 7–23)
C DIFF GDH STL QL: NEGATIVE — SIGNIFICANT CHANGE UP
C DIFF GDH STL QL: SIGNIFICANT CHANGE UP
CALCIUM SERPL-MCNC: 7.5 MG/DL — LOW (ref 8.4–10.5)
CHLORIDE SERPL-SCNC: 108 MMOL/L — SIGNIFICANT CHANGE UP (ref 96–108)
CO2 SERPL-SCNC: 19 MMOL/L — LOW (ref 22–31)
CREAT SERPL-MCNC: 0.92 MG/DL — SIGNIFICANT CHANGE UP (ref 0.5–1.3)
CULTURE RESULTS: SIGNIFICANT CHANGE UP
CULTURE RESULTS: SIGNIFICANT CHANGE UP
GAS PNL BLDA: SIGNIFICANT CHANGE UP
GLUCOSE BLDC GLUCOMTR-MCNC: 160 MG/DL — HIGH (ref 70–99)
GLUCOSE BLDC GLUCOMTR-MCNC: 434 MG/DL — HIGH (ref 70–99)
GLUCOSE BLDC GLUCOMTR-MCNC: 54 MG/DL — LOW (ref 70–99)
GLUCOSE SERPL-MCNC: 58 MG/DL — LOW (ref 70–99)
HCT VFR BLD CALC: 33.9 % — LOW (ref 34.5–45)
HGB BLD-MCNC: 10.9 G/DL — LOW (ref 11.5–15.5)
MAGNESIUM SERPL-MCNC: 1.9 MG/DL — SIGNIFICANT CHANGE UP (ref 1.6–2.6)
MCHC RBC-ENTMCNC: 30.7 PG — SIGNIFICANT CHANGE UP (ref 27–34)
MCHC RBC-ENTMCNC: 32.1 GM/DL — SIGNIFICANT CHANGE UP (ref 32–36)
MCV RBC AUTO: 95.5 FL — SIGNIFICANT CHANGE UP (ref 80–100)
PHOSPHATE SERPL-MCNC: 4.3 MG/DL — SIGNIFICANT CHANGE UP (ref 2.5–4.5)
PLATELET # BLD AUTO: 271 K/UL — SIGNIFICANT CHANGE UP (ref 150–400)
POTASSIUM SERPL-MCNC: 5.1 MMOL/L — SIGNIFICANT CHANGE UP (ref 3.5–5.3)
POTASSIUM SERPL-SCNC: 5.1 MMOL/L — SIGNIFICANT CHANGE UP (ref 3.5–5.3)
RBC # BLD: 3.55 M/UL — LOW (ref 3.8–5.2)
RBC # FLD: 20.6 % — HIGH (ref 10.3–14.5)
SODIUM SERPL-SCNC: 141 MMOL/L — SIGNIFICANT CHANGE UP (ref 135–145)
SPECIMEN SOURCE: SIGNIFICANT CHANGE UP
SPECIMEN SOURCE: SIGNIFICANT CHANGE UP
WBC # BLD: 17 K/UL — HIGH (ref 3.8–10.5)
WBC # FLD AUTO: 17 K/UL — HIGH (ref 3.8–10.5)

## 2018-05-25 PROCEDURE — 93306 TTE W/DOPPLER COMPLETE: CPT | Mod: 26

## 2018-05-25 PROCEDURE — 71045 X-RAY EXAM CHEST 1 VIEW: CPT | Mod: 26

## 2018-05-25 PROCEDURE — 99233 SBSQ HOSP IP/OBS HIGH 50: CPT | Mod: GC

## 2018-05-25 RX ORDER — LEVOTHYROXINE SODIUM 125 MCG
75 TABLET ORAL DAILY
Qty: 0 | Refills: 0 | Status: DISCONTINUED | OUTPATIENT
Start: 2018-05-25 | End: 2018-05-30

## 2018-05-25 RX ORDER — SODIUM CHLORIDE 9 MG/ML
500 INJECTION INTRAMUSCULAR; INTRAVENOUS; SUBCUTANEOUS ONCE
Qty: 0 | Refills: 0 | Status: COMPLETED | OUTPATIENT
Start: 2018-05-25 | End: 2018-05-25

## 2018-05-25 RX ORDER — ACETAMINOPHEN 500 MG
700 TABLET ORAL ONCE
Qty: 0 | Refills: 0 | Status: DISCONTINUED | OUTPATIENT
Start: 2018-05-25 | End: 2018-05-25

## 2018-05-25 RX ORDER — SODIUM CHLORIDE 9 MG/ML
1000 INJECTION INTRAMUSCULAR; INTRAVENOUS; SUBCUTANEOUS
Qty: 0 | Refills: 0 | Status: DISCONTINUED | OUTPATIENT
Start: 2018-05-25 | End: 2018-05-25

## 2018-05-25 RX ORDER — SODIUM CHLORIDE 9 MG/ML
1000 INJECTION, SOLUTION INTRAVENOUS
Qty: 0 | Refills: 0 | Status: DISCONTINUED | OUTPATIENT
Start: 2018-05-25 | End: 2018-05-27

## 2018-05-25 RX ORDER — DOCUSATE SODIUM 100 MG
100 CAPSULE ORAL
Qty: 0 | Refills: 0 | Status: DISCONTINUED | OUTPATIENT
Start: 2018-05-25 | End: 2018-05-25

## 2018-05-25 RX ORDER — ACETAMINOPHEN 500 MG
700 TABLET ORAL ONCE
Qty: 0 | Refills: 0 | Status: COMPLETED | OUTPATIENT
Start: 2018-05-25 | End: 2018-05-25

## 2018-05-25 RX ORDER — ACETAMINOPHEN 500 MG
1000 TABLET ORAL ONCE
Qty: 0 | Refills: 0 | Status: DISCONTINUED | OUTPATIENT
Start: 2018-05-25 | End: 2018-05-25

## 2018-05-25 RX ORDER — VANCOMYCIN HCL 1 G
1000 VIAL (EA) INTRAVENOUS ONCE
Qty: 0 | Refills: 0 | Status: DISCONTINUED | OUTPATIENT
Start: 2018-05-25 | End: 2018-05-25

## 2018-05-25 RX ORDER — MEROPENEM 1 G/30ML
1000 INJECTION INTRAVENOUS ONCE
Qty: 0 | Refills: 0 | Status: DISCONTINUED | OUTPATIENT
Start: 2018-05-25 | End: 2018-05-25

## 2018-05-25 RX ORDER — MEROPENEM 1 G/30ML
1000 INJECTION INTRAVENOUS EVERY 8 HOURS
Qty: 0 | Refills: 0 | Status: DISCONTINUED | OUTPATIENT
Start: 2018-05-25 | End: 2018-05-25

## 2018-05-25 RX ORDER — PIPERACILLIN AND TAZOBACTAM 4; .5 G/20ML; G/20ML
3.38 INJECTION, POWDER, LYOPHILIZED, FOR SOLUTION INTRAVENOUS EVERY 8 HOURS
Qty: 0 | Refills: 0 | Status: DISCONTINUED | OUTPATIENT
Start: 2018-05-25 | End: 2018-05-25

## 2018-05-25 RX ORDER — SODIUM CHLORIDE 9 MG/ML
1000 INJECTION INTRAMUSCULAR; INTRAVENOUS; SUBCUTANEOUS ONCE
Qty: 0 | Refills: 0 | Status: DISCONTINUED | OUTPATIENT
Start: 2018-05-25 | End: 2018-05-25

## 2018-05-25 RX ORDER — MEROPENEM 1 G/30ML
INJECTION INTRAVENOUS
Qty: 0 | Refills: 0 | Status: DISCONTINUED | OUTPATIENT
Start: 2018-05-25 | End: 2018-05-25

## 2018-05-25 RX ADMIN — Medication 650 MILLIGRAM(S): at 07:00

## 2018-05-25 RX ADMIN — ATORVASTATIN CALCIUM 40 MILLIGRAM(S): 80 TABLET, FILM COATED ORAL at 21:42

## 2018-05-25 RX ADMIN — PIPERACILLIN AND TAZOBACTAM 25 GRAM(S): 4; .5 INJECTION, POWDER, LYOPHILIZED, FOR SOLUTION INTRAVENOUS at 05:49

## 2018-05-25 RX ADMIN — Medication 700 MILLIGRAM(S): at 15:48

## 2018-05-25 RX ADMIN — Medication 25 MILLIGRAM(S): at 05:49

## 2018-05-25 RX ADMIN — Medication 650 MILLIGRAM(S): at 05:49

## 2018-05-25 RX ADMIN — Medication 650 MILLIGRAM(S): at 21:42

## 2018-05-25 RX ADMIN — SODIUM CHLORIDE 75 MILLILITER(S): 9 INJECTION, SOLUTION INTRAVENOUS at 19:20

## 2018-05-25 RX ADMIN — Medication 75 MICROGRAM(S): at 05:49

## 2018-05-25 RX ADMIN — Medication 100 MILLIGRAM(S): at 05:49

## 2018-05-25 RX ADMIN — SODIUM CHLORIDE 100 MILLILITER(S): 9 INJECTION INTRAMUSCULAR; INTRAVENOUS; SUBCUTANEOUS at 14:43

## 2018-05-25 RX ADMIN — MIRTAZAPINE 7.5 MILLIGRAM(S): 45 TABLET, ORALLY DISINTEGRATING ORAL at 21:42

## 2018-05-25 RX ADMIN — SODIUM CHLORIDE 666.67 MILLILITER(S): 9 INJECTION INTRAMUSCULAR; INTRAVENOUS; SUBCUTANEOUS at 11:44

## 2018-05-25 RX ADMIN — Medication 3 MILLIGRAM(S): at 21:42

## 2018-05-25 RX ADMIN — Medication 0.12 MILLIGRAM(S): at 05:49

## 2018-05-25 RX ADMIN — Medication 650 MILLIGRAM(S): at 23:05

## 2018-05-25 RX ADMIN — Medication 280 MILLIGRAM(S): at 14:48

## 2018-05-25 NOTE — PROGRESS NOTE ADULT - PROBLEM SELECTOR PLAN 6
2/2 underlying sarcoma, duodenal masses with fistula, possible intraabdominal infection  -s/p zosyn 5-day course  -Avoid opioids due to mental status

## 2018-05-25 NOTE — PROGRESS NOTE ADULT - PROBLEM SELECTOR PLAN 8
Home regimen lopressor 50mg BID  -continue loperssor 25 BID, monitor for hypotension  -monitor on telemetry, currently in AF rate 70s-120  -continue dig 0.125mg daily  -no AC 2/2 bleeding risk

## 2018-05-25 NOTE — PROGRESS NOTE ADULT - PROBLEM SELECTOR PLAN 1
Likely GIB in the setting of duodenal mass which has bled before. Likely contributing to hypotension.  -pantoprazole PO daily  -appreciate GI recs, no plan for scope unless has bloody BM. Bowel movements have been normal  -hgb remains stabilized above baseline Hg s/p 2u pRBC  -monitor CBC daily, maintain active T&S

## 2018-05-25 NOTE — PROGRESS NOTE ADULT - PROBLEM SELECTOR PLAN 3
Leukocytosis 28 on admission, now improving. Tachycardia on admission 2/2 afib with rvr. Unclear whether presence of SIRs is reactive 2/2 malignancy and blood loss vs infectious. Compressive atelectasis on CT, but cannot r/o pneumonia. Per son who is physician, pleural effusion is chronic and has been tapped in past. No other concerning signs for pneumonia at this time. Other possible source of infection is intraabdominal.  -continue zosyn (5/21 - 5/25) for 5-day course  -BCx no growth to date. Attempt to obtain urine sample ongoing. Pt is incontinent and edema makes straight cath difficult  -If has diarrhea, will check C diff  -Procalcitonin 0.46 indeterminate

## 2018-05-25 NOTE — CONSULT NOTE ADULT - ATTENDING COMMENTS
Agree with above.  Advanced metastatic sarcoma with hypotension and hypoglycemia.  Goals of care discussions ongoing. Patient is now DNR/DNI. Family aware of poor overall prognosis.  Palliative care consult recommended.  No need for ICU at this time.

## 2018-05-25 NOTE — PROGRESS NOTE ADULT - PROBLEM SELECTOR PLAN 4
Likely multifactorial: May be secondary to sepsis, acute GIB, AF RVR, decreased PO intake  -improving and stabilized today after receiving IVF and blood transfusion  -dependent edema, no IVF  -cont to monitor closely

## 2018-05-25 NOTE — PROGRESS NOTE ADULT - PROBLEM SELECTOR PLAN 2
Pt reporting right foot pain. Right toes were are erythematous and cool to touch. Right pedal pulse palpable and present on doppler.  -Pt refused NADIRA bilateral LE study  -consider vascular consult, but is likely poor candidate for intervention and high risk for GIB Pt reporting right foot pain. Right toes were are erythematous and cool to touch. Right pedal pulse palpable and present on doppler.  -Pt refused NADIRA bilateral LE study  -consider vascular consult, but is likely poor candidate for intervention and high risk for GIB  -HCP aware

## 2018-05-25 NOTE — CHART NOTE - NSCHARTNOTEFT_GEN_A_CORE
Rapid response called for hypotension to the 50s systolic blood pressure. I called the patient's son, Dr. Reymundo Rivera ([phone]) to further discuss goals of care. I explained to him that she was hypotensive and lost IV access. He stated that her living will had been faxed to the hospital this AM. He stated that given her medical comorbidities that she is to be DNR. I offered the placement of a central line or intraosseous access for pressor support. He stated that he would prefer for peripheral access to be placed given her condition. He would prefer further attempts to place peripheral IVs and fluid administration. He stated that given her condition, her prognosis is poor if she develops septic shock and that pressors would only prolong life by potentially a few days. I explained my concerns that the patient's condition could deteriorate rapidly given the hypotension and lack of access. He stated that he would not want her to be made uncomfortable by placement of central or intraosseous access. The patient is DNR and central access is not to be attempted.    Ultrasound guided IV placed by MICU NP, IV fluids being administered. Will continue fluid resuscitation and monitoring of blood pressure. Patient is DNR.    Tu Serna, PGY-2  [phone] Rapid response called for hypotension to the 50s systolic blood pressure. I called the patient's son, Dr. Reymundo Rivera ([phone]) to further discuss goals of care. I explained to him that she was hypotensive and lost IV access. He stated that her living will had been faxed to the hospital this AM. He stated that given her medical comorbidities that she is to be DNR. I offered the placement of a central line or intraosseous access for pressor support. He stated that he would prefer for peripheral access to be placed given her condition. He would prefer further attempts to place peripheral IVs and fluid administration. He stated that given her condition, her prognosis is poor if she develops septic shock and that pressors would only prolong life by potentially a few days. I explained my concerns that the patient's condition could deteriorate rapidly given the hypotension and lack of access. He stated that he would not want her to be made uncomfortable by placement of central or intraosseous access. The patient is DNR and central access is not to be attempted.    Ultrasound guided IV placed by MICU NP, IV fluids being administered. Will continue fluid resuscitation and monitoring of blood pressure. Patient is DNR.    Tu Serna, PGY-2  [phone]    MAR addendum:     As mentioned above.  To add: Patient at baseline mental status throughout rapid response.  MAP low to mid 60s ranging 60s-80s/40s-60s throughout RRT.  Patient c/o abdominal pain since the AM, concern for intra-abdominal pathology, so stat CXR upright obtained, no free air under diaphragm, air in bowel, but non-dilated loops.  Per family discussion, and in keeping with patient's wishes, will refrain from causing patient further discomfort, even if imminent death likely, will stop antibiotics, will administer another bolus NS, and continue maintenance IVF, no additional medical management.  Discontinue Abx for de-escalation of care.     Marisol Hopkins, PGY-3  [facility] [phone]

## 2018-05-25 NOTE — PROGRESS NOTE ADULT - ATTENDING COMMENTS
pt hypotensive and lethargic today- RRT called (see resident note for full details), micu consulted and IV access obtained with ultrasound (pt had previously lost her IV). noted to be hypoglycemic with watery diarrhea. d50 and fluid bolus given. sent c. diff sample and repeat abx. checking AXR. GOC discussed at length between Dr. Serna and son Reymundo, as well as myself, Dr. Serna and son rajeev at bedside. Family wants the focus to be comfort care. They have declined central line or other invasive interventions. Pt is DNR/DNI. They are agreeable to IVF. We also discussed monitoring pt off antibiotics as we do not have a source we are treating.  If able to leave hospital goal of family would be for pt to go to [facility]. If clinical condition declines further will readdress this with family- brought up concept of hospice care today with mathew boo and this will need to be discussed further.   Will repeat TFTs and digoxin level with AM labs.  Continue to monitor hgb and follow up blood cx and c. diff.   can consider trial of midodrine if family agreeable and BP doesn't improve.

## 2018-05-25 NOTE — PROGRESS NOTE ADULT - PROBLEM SELECTOR PLAN 9
DVT: No pharmacologic 2/2 GIB, SCDs  Diet: mechanical soft  Dispo: PT eval pending for rehab, transfer to [facility] cancelled

## 2018-05-25 NOTE — PROGRESS NOTE ADULT - SUBJECTIVE AND OBJECTIVE BOX
Internal Medicine Progress Note    Claire De Paz, PGY1  Internal Medicine, team 1  Pager [phone] / [phone]  After 7PM on weekdays and 12PM on weekends, please page #[phone]    Patient is a 73y old  Female who presents with a chief complaint of Lethargy, abd pain (21 May 2018 01:47)      SUBJECTIVE / OVERNIGHT EVENTS: Spoke to daughter and son Dr. Reymundo Rivera yesterday. They agreed to cancel [facility] transfer and pursue rehab. Pt is not very interested in talking today because she wants to sleep. Says she feels fine. Having soft stools, no blood. On tele AF . Pt's living will brought to hospital by son.    MEDICATIONS  (STANDING):  atorvastatin 40 milliGRAM(s) Oral at bedtime  calcium carbonate 500 mG (Tums) Chewable 1 Tablet(s) Chew once  digoxin     Tablet 0.125 milliGRAM(s) Oral daily  docusate sodium 100 milliGRAM(s) Oral two times a day  levothyroxine 75 MICROGram(s) Oral daily  melatonin 3 milliGRAM(s) Oral at bedtime  metoprolol tartrate 25 milliGRAM(s) Oral two times a day  mirtazapine 7.5 milliGRAM(s) Oral daily  pantoprazole    Tablet 40 milliGRAM(s) Oral daily  piperacillin/tazobactam IVPB. 3.375 Gram(s) IV Intermittent every 8 hours  senna 2 Tablet(s) Oral at bedtime    MEDICATIONS  (PRN):  acetaminophen   Tablet. 650 milliGRAM(s) Oral every 6 hours PRN Mild Pain (1 - 3)      Vital Signs Last 24 Hrs  T(C): 37.1 (25 May 2018 04:23), Max: 37.1 (25 May 2018 04:23)  T(F): 98.7 (25 May 2018 04:23), Max: 98.7 (25 May 2018 04:23)  HR: 80 (25 May 2018 04:23) (80 - 94)  BP: 100/69 (25 May 2018 04:23) (99/78 - 102/77)  BP(mean): --  RR: 18 (25 May 2018 04:23) (18 - 18)  SpO2: 97% (25 May 2018 04:23) (63% - 97%)    CAPILLARY BLOOD GLUCOSE          I&O's Summary    24 May 2018 07:01  -  25 May 2018 07:00  --------------------------------------------------------  IN: 380 mL / OUT: 100 mL / NET: 280 mL      PHYSICAL EXAM  GENERAL: NAD, alert  HEAD:  Atraumatic, temporal wasting  EYES: EOMI, PERRLA, conjunctiva and sclera clear  NECK: Supple, No JVD  CHEST/LUNG: dec BS at bases, no tachypnea, clear to auscultation  HEART: irregularly irregular  ABDOMEN: Soft, mildly distended, mild generalized tenderness, no rebound tenderness; Bowel sounds present.  EXTREMITIES:  1+ Peripheral Pulses by palpation and present by doppler, No clubbing, cyanosis, or edema. Toes same temperature to touch, right toes erythematous compared to left.  NEURO/PSYCH: more alert, oriented x 4, mild aphasia otherwise nonfocal, moves right toes  SKIN: No rashes or lesions      LABS:                          10.5   16.61 )-----------( 272      ( 24 May 2018 09:34 )             32.5     CBC Full  -  ( 24 May 2018 09:34 )  WBC Count : 16.61 K/uL  Hemoglobin : 10.5 g/dL  Hematocrit : 32.5 %  Platelet Count - Automated : 272 K/uL  Mean Cell Volume : 87.6 fl  Mean Cell Hemoglobin : 28.3 pg  Mean Cell Hemoglobin Concentration : 32.3 gm/dL  Auto Neutrophil # : 15.56 K/uL  Auto Lymphocyte # : 0.45 K/uL  Auto Monocyte # : 0.45 K/uL  Auto Eosinophil # : 0.15 K/uL  Auto Basophil # : 0.00 K/uL  Auto Neutrophil % : 93.7 %  Auto Lymphocyte % : 2.7 %  Auto Monocyte % : 2.7 %  Auto Eosinophil % : 0.9 %  Auto Basophil % : 0.0 %    05-24    141  |  108  |  18  ----------------------------<  74  3.5   |  20<L>  |  0.89    Ca    7.1<L>      24 May 2018 07:13  Phos  3.2     05-24  Mg     1.8     05-24    TPro  5.2<L>  /  Alb  1.9<L>  /  TBili  0.7  /  DBili  x   /  AST  6<L>  /  ALT  5<L>  /  AlkPhos  94  05-23    Creatinine Trend: 0.89<--, 0.84<--, 0.82<--, 0.73<--, 0.75<--, 0.71<--  LIVER FUNCTIONS - ( 23 May 2018 12:48 )  Alb: 1.9 g/dL / Pro: 5.2 g/dL / ALK PHOS: 94 U/L / ALT: 5 U/L / AST: 6 U/L / GGT: x           PT/INR - ( 24 May 2018 09:32 )   PT: 15.8 sec;   INR: 1.39 ratio           MICROBIOLOGY:  BCx ng    RADIOLOGY & ADDITIONAL TESTS:  < from: CT Abdomen and Pelvis w/ IV Cont (05.20.18 @ 20:03) >  IMPRESSION:     Small to moderate and moderate to large left pleural effusion with   compressive atelectasis. Recommend clinical correlation to assess   underlying pneumonia.    Decreased size of the left renal fossa/retroperitoneal mass since   1/31/2017. Evidence of fistulization between the mass and the adjacent   duodenum and descending colon as described.    Indeterminate hypodense focus in theright hepatic lobe.   Neoplasm/metastasis cannot be excluded. This can be further characterized   on a nonemergent contrast enhanced MRI.    Endometrial thickening. Neoplasm cannot be excluded in a postmenopausal   patient. Recommend clinical correlation and follow-up.    Additional findings as described.  < end of copied text >    CXR: personally reviewed. Small bilateral pleural effusions  AXR: non obstructive bowel gas pattern. personally reviewed.       CONSULTS: GI Internal Medicine Progress Note    Claire De Paz, PGY1  Internal Medicine, team 1  Pager [phone] / [phone]  After 7PM on weekdays and 12PM on weekends, please page #[phone]    Patient is a 73y old  Female who presents with a chief complaint of Lethargy, abd pain (21 May 2018 01:47)      SUBJECTIVE / OVERNIGHT EVENTS: Spoke to daughter and son Dr. Reymundo Rivera yesterday. They agreed to cancel [facility] transfer and pursue rehab. Pt is not very interested in talking today because she wants to sleep. Says she feels fine. Having soft stools, no blood. On tele AF . Pt's living will and document that son, Dr. Reymundo Rivera, is the HCP is in paper chart.    MEDICATIONS  (STANDING):  atorvastatin 40 milliGRAM(s) Oral at bedtime  calcium carbonate 500 mG (Tums) Chewable 1 Tablet(s) Chew once  digoxin     Tablet 0.125 milliGRAM(s) Oral daily  docusate sodium 100 milliGRAM(s) Oral two times a day  levothyroxine 75 MICROGram(s) Oral daily  melatonin 3 milliGRAM(s) Oral at bedtime  metoprolol tartrate 25 milliGRAM(s) Oral two times a day  mirtazapine 7.5 milliGRAM(s) Oral daily  pantoprazole    Tablet 40 milliGRAM(s) Oral daily  piperacillin/tazobactam IVPB. 3.375 Gram(s) IV Intermittent every 8 hours  senna 2 Tablet(s) Oral at bedtime    MEDICATIONS  (PRN):  acetaminophen   Tablet. 650 milliGRAM(s) Oral every 6 hours PRN Mild Pain (1 - 3)      Vital Signs Last 24 Hrs  T(C): 37.1 (25 May 2018 04:23), Max: 37.1 (25 May 2018 04:23)  T(F): 98.7 (25 May 2018 04:23), Max: 98.7 (25 May 2018 04:23)  HR: 80 (25 May 2018 04:23) (80 - 94)  BP: 100/69 (25 May 2018 04:23) (99/78 - 102/77)  BP(mean): --  RR: 18 (25 May 2018 04:23) (18 - 18)  SpO2: 97% (25 May 2018 04:23) (63% - 97%)    CAPILLARY BLOOD GLUCOSE          I&O's Summary    24 May 2018 07:01  -  25 May 2018 07:00  --------------------------------------------------------  IN: 380 mL / OUT: 100 mL / NET: 280 mL      PHYSICAL EXAM  GENERAL: NAD, alert  HEAD:  Atraumatic, temporal wasting  EYES: EOMI, PERRLA, conjunctiva and sclera clear  NECK: Supple, No JVD  CHEST/LUNG: dec BS at bases, no tachypnea, clear to auscultation  HEART: irregularly irregular  ABDOMEN: Soft, mildly distended, mild generalized tenderness, no rebound tenderness; Bowel sounds present.  EXTREMITIES:  1+ Peripheral Pulses by palpation and present by doppler, No clubbing, cyanosis, or edema. Toes same temperature to touch, right toes erythematous compared to left.  NEURO/PSYCH: more alert, oriented x 4, mild aphasia otherwise nonfocal, moves right toes  SKIN: No rashes or lesions      LABS:                          10.5   16.61 )-----------( 272      ( 24 May 2018 09:34 )             32.5     CBC Full  -  ( 24 May 2018 09:34 )  WBC Count : 16.61 K/uL  Hemoglobin : 10.5 g/dL  Hematocrit : 32.5 %  Platelet Count - Automated : 272 K/uL  Mean Cell Volume : 87.6 fl  Mean Cell Hemoglobin : 28.3 pg  Mean Cell Hemoglobin Concentration : 32.3 gm/dL  Auto Neutrophil # : 15.56 K/uL  Auto Lymphocyte # : 0.45 K/uL  Auto Monocyte # : 0.45 K/uL  Auto Eosinophil # : 0.15 K/uL  Auto Basophil # : 0.00 K/uL  Auto Neutrophil % : 93.7 %  Auto Lymphocyte % : 2.7 %  Auto Monocyte % : 2.7 %  Auto Eosinophil % : 0.9 %  Auto Basophil % : 0.0 %    05-24    141  |  108  |  18  ----------------------------<  74  3.5   |  20<L>  |  0.89    Ca    7.1<L>      24 May 2018 07:13  Phos  3.2     05-24  Mg     1.8     05-24    TPro  5.2<L>  /  Alb  1.9<L>  /  TBili  0.7  /  DBili  x   /  AST  6<L>  /  ALT  5<L>  /  AlkPhos  94  05-23    Creatinine Trend: 0.89<--, 0.84<--, 0.82<--, 0.73<--, 0.75<--, 0.71<--  LIVER FUNCTIONS - ( 23 May 2018 12:48 )  Alb: 1.9 g/dL / Pro: 5.2 g/dL / ALK PHOS: 94 U/L / ALT: 5 U/L / AST: 6 U/L / GGT: x           PT/INR - ( 24 May 2018 09:32 )   PT: 15.8 sec;   INR: 1.39 ratio           MICROBIOLOGY:  BCx ng    RADIOLOGY & ADDITIONAL TESTS:  < from: CT Abdomen and Pelvis w/ IV Cont (05.20.18 @ 20:03) >  IMPRESSION:     Small to moderate and moderate to large left pleural effusion with   compressive atelectasis. Recommend clinical correlation to assess   underlying pneumonia.    Decreased size of the left renal fossa/retroperitoneal mass since   1/31/2017. Evidence of fistulization between the mass and the adjacent   duodenum and descending colon as described.    Indeterminate hypodense focus in theright hepatic lobe.   Neoplasm/metastasis cannot be excluded. This can be further characterized   on a nonemergent contrast enhanced MRI.    Endometrial thickening. Neoplasm cannot be excluded in a postmenopausal   patient. Recommend clinical correlation and follow-up.    Additional findings as described.  < end of copied text >    CXR: personally reviewed. Small bilateral pleural effusions  AXR: non obstructive bowel gas pattern. personally reviewed.       CONSULTS: GI Internal Medicine Progress Note    Claire De Paz, PGY1  Internal Medicine, team 1  Pager [phone] / [phone]  After 7PM on weekdays and 12PM on weekends, please page #[phone]    Patient is a 73y old  Female who presents with a chief complaint of Lethargy, abd pain (21 May 2018 01:47)      SUBJECTIVE / OVERNIGHT EVENTS: Spoke to daughter and son Dr. Reymundo Rivera yesterday. They agreed to cancel [facility] transfer and pursue rehab. Pt is not very interested in talking today because she wants to sleep. Says she feels fine. Having soft stools, no blood. On tele AF . Pt's living will and document that son, Dr. Reymundo Rivera, is the HCP is in paper chart. PT came to work with pt but BP 75/57, . She felt light headed, no CP/SOB. No BM for a while. Has not been eating much, start 500cc bolus.    MEDICATIONS  (STANDING):  atorvastatin 40 milliGRAM(s) Oral at bedtime  calcium carbonate 500 mG (Tums) Chewable 1 Tablet(s) Chew once  digoxin     Tablet 0.125 milliGRAM(s) Oral daily  docusate sodium 100 milliGRAM(s) Oral two times a day  levothyroxine 75 MICROGram(s) Oral daily  melatonin 3 milliGRAM(s) Oral at bedtime  metoprolol tartrate 25 milliGRAM(s) Oral two times a day  mirtazapine 7.5 milliGRAM(s) Oral daily  pantoprazole    Tablet 40 milliGRAM(s) Oral daily  piperacillin/tazobactam IVPB. 3.375 Gram(s) IV Intermittent every 8 hours  senna 2 Tablet(s) Oral at bedtime    MEDICATIONS  (PRN):  acetaminophen   Tablet. 650 milliGRAM(s) Oral every 6 hours PRN Mild Pain (1 - 3)      Vital Signs Last 24 Hrs  T(C): 37.1 (25 May 2018 04:23), Max: 37.1 (25 May 2018 04:23)  T(F): 98.7 (25 May 2018 04:23), Max: 98.7 (25 May 2018 04:23)  HR: 80 (25 May 2018 04:23) (80 - 94)  BP: 100/69 (25 May 2018 04:23) (99/78 - 102/77)  BP(mean): --  RR: 18 (25 May 2018 04:23) (18 - 18)  SpO2: 97% (25 May 2018 04:23) (63% - 97%)    CAPILLARY BLOOD GLUCOSE          I&O's Summary    24 May 2018 07:01  -  25 May 2018 07:00  --------------------------------------------------------  IN: 380 mL / OUT: 100 mL / NET: 280 mL      PHYSICAL EXAM  GENERAL: NAD, alert  HEAD:  Atraumatic, temporal wasting  EYES: EOMI, PERRLA, conjunctiva and sclera clear  NECK: Supple, No JVD  CHEST/LUNG: dec BS at bases, no tachypnea, clear to auscultation  HEART: irregularly irregular  ABDOMEN: Soft, mildly distended, mild generalized tenderness, no rebound tenderness; Bowel sounds present.  EXTREMITIES:  1+ Peripheral Pulses by palpation and present by doppler, No clubbing, cyanosis, or edema. Toes same temperature to touch, right toes erythematous compared to left.  NEURO/PSYCH: more alert, oriented x 4, mild aphasia otherwise nonfocal, moves right toes  SKIN: No rashes or lesions      LABS:                          10.5   16.61 )-----------( 272      ( 24 May 2018 09:34 )             32.5     CBC Full  -  ( 24 May 2018 09:34 )  WBC Count : 16.61 K/uL  Hemoglobin : 10.5 g/dL  Hematocrit : 32.5 %  Platelet Count - Automated : 272 K/uL  Mean Cell Volume : 87.6 fl  Mean Cell Hemoglobin : 28.3 pg  Mean Cell Hemoglobin Concentration : 32.3 gm/dL  Auto Neutrophil # : 15.56 K/uL  Auto Lymphocyte # : 0.45 K/uL  Auto Monocyte # : 0.45 K/uL  Auto Eosinophil # : 0.15 K/uL  Auto Basophil # : 0.00 K/uL  Auto Neutrophil % : 93.7 %  Auto Lymphocyte % : 2.7 %  Auto Monocyte % : 2.7 %  Auto Eosinophil % : 0.9 %  Auto Basophil % : 0.0 %    05-24    141  |  108  |  18  ----------------------------<  74  3.5   |  20<L>  |  0.89    Ca    7.1<L>      24 May 2018 07:13  Phos  3.2     05-24  Mg     1.8     05-24    TPro  5.2<L>  /  Alb  1.9<L>  /  TBili  0.7  /  DBili  x   /  AST  6<L>  /  ALT  5<L>  /  AlkPhos  94  05-23    Creatinine Trend: 0.89<--, 0.84<--, 0.82<--, 0.73<--, 0.75<--, 0.71<--  LIVER FUNCTIONS - ( 23 May 2018 12:48 )  Alb: 1.9 g/dL / Pro: 5.2 g/dL / ALK PHOS: 94 U/L / ALT: 5 U/L / AST: 6 U/L / GGT: x           PT/INR - ( 24 May 2018 09:32 )   PT: 15.8 sec;   INR: 1.39 ratio           MICROBIOLOGY:  BCx ng    RADIOLOGY & ADDITIONAL TESTS:  < from: CT Abdomen and Pelvis w/ IV Cont (05.20.18 @ 20:03) >  IMPRESSION:     Small to moderate and moderate to large left pleural effusion with   compressive atelectasis. Recommend clinical correlation to assess   underlying pneumonia.    Decreased size of the left renal fossa/retroperitoneal mass since   1/31/2017. Evidence of fistulization between the mass and the adjacent   duodenum and descending colon as described.    Indeterminate hypodense focus in theright hepatic lobe.   Neoplasm/metastasis cannot be excluded. This can be further characterized   on a nonemergent contrast enhanced MRI.    Endometrial thickening. Neoplasm cannot be excluded in a postmenopausal   patient. Recommend clinical correlation and follow-up.    Additional findings as described.  < end of copied text >    CXR: personally reviewed. Small bilateral pleural effusions  AXR: non obstructive bowel gas pattern. personally reviewed.       CONSULTS: GI

## 2018-05-25 NOTE — PROGRESS NOTE ADULT - PROBLEM SELECTOR PLAN 5
No current treatment this admission. Discussed care with pts oncologist at [facility]. At this time no further treatment planned given poor performance status. ongoing [facility] discussions with son regarding dispo. She has been living with children prior to admission.  -Per oncologist Dr Smith at [facility] no further systemic treatment will be offered. Possible RT but can be pursued as an outpatient. [facility] transfer cancelled No current treatment this admission. [place] [facility] [place] At this time no further treatment planned given poor performance status. ongoing [facility] discussions with son regarding dispo. She has been living with children prior to admission.  -[place] [facility] [place] further systemic treatment will be offered. Possible RT but can be pursued as an outpatient. [place] [facility] [place] [facility] [place]

## 2018-05-25 NOTE — PROGRESS NOTE ADULT - PROBLEM SELECTOR PLAN 10
[facility] readressed with trace Rivera. Sons reportedly HCPs (will need to provide paperwork). Brought in pt's living will which states she dose not want to she would not want to be intubated for a prolonged amount of time if there is no change of recovery. [place] [facility] and she does not want to discuss her code status currently. [facility] readdressed with sons Claire Rivera. Son, Dr. Reymundo Rivera, is the HCP (document in chart). Living will is in chart, which states she dose not want to she would not want to be intubated for a prolonged amount of time if there is no chance of recovery. Pt is full code at [facility]. She has capacity and she does not want to discuss her code status currently.

## 2018-05-25 NOTE — CONSULT NOTE ADULT - ASSESSMENT
Plan:  #Neuro:  -neuro checks q 4 hrs and prn for changes  -pt is DNR/DNI - GOC obtained by medical resident  -obtain paliative care consult    #Pulm:  -supplemental O2 to maintain spo2 > 92%  -HOB>/= 30 degree angle  -incentive spirometry q 2 hrs while awake    #CV:  -POCUS demonstrates IVC of 1.1 and collapsable   -PIV placed - administer NS 1 liter over 1 hr  -if vascular access needed - obtain vasc surg consult/IR for line placement    #GI/:  -continue diet as tolerated  -protonix 40 mg IV qd  -obtain nutritional consult for supplemental feeds    #I.D.:  -leukocytosis improving - continue current antibx's    #FEN/ENDO/HEME:  -pt glucose 50's  -administer D50W IVP x 1   -if poor intake start D5NS @60cc/hr  -obtain ABG/cmp/mg++/po--4/cbc/coags now and q am 73 yr old female with PMHx of afib not on A/C due to GI bleed, 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 found to have acute on chronic anemia felt due to underlying malignancy, received PRBC transfusion, leukocytosis of 28 started on vanco/karine with improvement to 17 as of today, course c/b afib with rvr requiring digoxin and lopressor.  RRT and consult called for hypotension with SBP 50's to 60's, hypoglycemia 50's.   Pt without access, POCUS peripheral IV placed pt received 1 liter NS, D50W IVP with improvement of SBP 80's to 90'    Plan:  #Neuro:  -neuro checks q 4 hrs and prn for changes  -pt is DNR/DNI - GOC obtained by medical resident  -obtain paliative care consult    #Pulm:  -supplemental O2 to maintain spo2 > 92%  -HOB>/= 30 degree angle  -incentive spirometry q 2 hrs while awake    #CV:  -POCUS demonstrates IVC of 1.1 and collapsable   -PIV placed - administer NS 1 liter over 1 hr  -if vascular access needed - obtain vasc surg consult/IR for line placement    #GI/:  -continue diet as tolerated  -protonix 40 mg IV qd  -obtain nutritional consult for supplemental feeds    #I.D.:  -leukocytosis improving - continue current antibx's    #FEN/ENDO/HEME:  -pt glucose 50's  -administer D50W IVP x 1   -if poor intake start D5NS @60cc/hr  -obtain ABG/cmp/mg++/po--4/cbc/coags now and q am  -in light of comorbidities - would obtain cortisol level - consider stress steroids

## 2018-05-25 NOTE — PROGRESS NOTE ADULT - PROBLEM SELECTOR PLAN 7
Home regimen synthroid 75 mcg daily per pharmacy. Per daughter, pt reported she wasn't taking her medications the 2 weeks prior to discharge because she felt too sick.  -TSH significantly elevated 31.9. Tree T4 and T3 low  -continue Synthyroid 75 mcg daily as pt was not being treated. Will need repeat TSH in 2 weeks

## 2018-05-25 NOTE — CONSULT NOTE ADULT - SUBJECTIVE AND OBJECTIVE BOX
CHIEF COMPLAINT:    HPI:    PAST MEDICAL & SURGICAL HISTORY:  Stroke  Atrial fibrillation  Sarcoma  Gastrointestinal bleed  Hypothyroid  History of nephrectomy  History of partial pancreatectomy      FAMILY HISTORY:  No pertinent family history in first degree relatives      SOCIAL HISTORY:  Smoking: [ ] Never Smoked [ ] Former Smoker (__ packs x ___ years) [ ] Current Smoker  (__ packs x ___ years)  Substance Use: [ ] Never Used [ ] Used ____  EtOH Use:  Marital Status: [ ] Single [ ]  [ ]  [ ]   Sexual History:   Occupation:  Recent Travel:  Country of Birth:  Advance Directives:    Allergies    No Known Allergies    Intolerances        HOME MEDICATIONS:    REVIEW OF SYSTEMS:  Constitutional: [ ] fevers [ ] chills [ ] weight loss [ ] weight gain  HEENT: [ ] dry eyes [ ] eye irritation [ ] postnasal drip [ ] nasal congestion  CV: [ ] chest pain [ ] orthopnea [ ] palpitations [ ] murmur  Resp: [ ] cough [ ] shortness of breath [ ] dyspnea [ ] wheezing [ ] sputum [ ] hemoptysis  GI: [ ] nausea [ ] vomiting [ ] diarrhea [ ] constipation [ ] abd pain [ ] dysphagia   : [ ] dysuria [ ] nocturia [ ] hematuria [ ] increased urinary frequency  Musculoskeletal: [ ] back pain [ ] myalgias [ ] arthralgias [ ] fracture  Skin: [ ] rash [ ] itch  Neurological: [ ] headache [ ] dizziness [ ] syncope [ ] weakness [ ] numbness  Psychiatric: [ ] anxiety [ ] depression  Endocrine: [ ] diabetes [ ] thyroid problem  Hematologic/Lymphatic: [ ] anemia [ ] bleeding problem  Allergic/Immunologic: [ ] itchy eyes [ ] nasal discharge [ ] hives [ ] angioedema  [ ] All other systems negative  [ ] Unable to assess ROS because ________    OBJECTIVE:  ICU Vital Signs Last 24 Hrs  T(C): 37.1 (25 May 2018 04:23), Max: 37.1 (25 May 2018 04:23)  T(F): 98.7 (25 May 2018 04:23), Max: 98.7 (25 May 2018 04:23)  HR: 108 (25 May 2018 10:33) (80 - 108)  BP: 59/46 (25 May 2018 12:25) (59/46 - 101/68)  BP(mean): --  ABP: --  ABP(mean): --  RR: 18 (25 May 2018 04:23) (18 - 18)  SpO2: 97% (25 May 2018 04:23) (63% - 97%)        05-24 @ 07:01 - 05-25 @ 07:00  --------------------------------------------------------  IN: 380 mL / OUT: 100 mL / NET: 280 mL    05-25 @ 07:01 - 05-25 @ 13:43  --------------------------------------------------------  IN: 500 mL / OUT: 0 mL / NET: 500 mL      CAPILLARY BLOOD GLUCOSE      POCT Blood Glucose.: 54 mg/dL (25 May 2018 12:49)      PHYSICAL EXAM:  Neuro:  pt awake, speaking farsi and english, oriented x 2, follows simple commands, LANGE well 4/5 CN intact    Pulm:  utilizing 2 liters N/C, resp even, slight labored, denies sob, breath sounds bilat clear, spo2 96%, POCUS anterior A lines, small bibasilar pleural effusions    CV:  afib with ventricular response 90's - 110's, s1/s2  I/VI sys murmur appreciated, POCUS with afib LV effacement appreciated, IVC 1.1 with collapsibility noted. peripheral pulses palpable with radial 1+ bilat, dp/pt 1+/1+ bilat. digits cyanotic however warm to touch with fair cap refill > 3< 6 sec, without peripheral edema     GI/:  abd soft slight tender in lower quads, + hypoactive bowel sounds, bladder non distended non palpable    Skin:  warm dry intact, without palpable lymph nodes appreciated, without JVD      LINES:     HOSPITAL MEDICATIONS:  MEDICATIONS  (STANDING):  acetaminophen  IVPB. 1000 milliGRAM(s) IV Intermittent once  atorvastatin 40 milliGRAM(s) Oral at bedtime  calcium carbonate 500 mG (Tums) Chewable 1 Tablet(s) Chew once  digoxin     Tablet 0.125 milliGRAM(s) Oral daily  docusate sodium 100 milliGRAM(s) Oral two times a day  levothyroxine 75 MICROGram(s) Oral daily  melatonin 3 milliGRAM(s) Oral at bedtime  meropenem  IVPB      metoprolol tartrate 25 milliGRAM(s) Oral two times a day  mirtazapine 7.5 milliGRAM(s) Oral daily  pantoprazole    Tablet 40 milliGRAM(s) Oral daily  senna 2 Tablet(s) Oral at bedtime  sodium chloride 0.9% Bolus 1000 milliLiter(s) IV Bolus once  vancomycin  IVPB 1000 milliGRAM(s) IV Intermittent once    MEDICATIONS  (PRN):  acetaminophen   Tablet. 650 milliGRAM(s) Oral every 6 hours PRN Mild Pain (1 - 3)      LABS:                        10.9   17.0  )-----------( 271      ( 25 May 2018 10:55 )             33.9     Hgb Trend: 10.9<--, 10.5<--, 10.2<--, 10.5<--, 12.5<--  05-25    141  |  108  |  19  ----------------------------<  58<L>  5.1   |  19<L>  |  0.92    Ca    7.5<L>      25 May 2018 10:54  Phos  4.3     05-25  Mg     1.9     05-25      Creatinine Trend: 0.92<--, 0.89<--, 0.84<--, 0.82<--, 0.73<--, 0.75<--  PT/INR - ( 24 May 2018 09:32 )   PT: 15.8 sec;   INR: 1.39 ratio                   MICROBIOLOGY:     RADIOLOGY:  [ ] Reviewed and interpreted by me    EKG:        Cate ANP-BC (ext. 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 found to have acute on chronic anemia felt due to underlying malignancy, received PRBC transfusion, leukocytosis of 28 started on vanco/karine with improvement to 17 as of today, course c/b afib with rvr requiring digoxin and lopressor.  RRT and consult called for hypotension with SBP 50's to 60's, hypoglycemia 50's.   Pt without access, POCUS peripheral IV placed pt received 1 liter NS, D50W IVP with improvement of SBP 80's to 90's  GOC was obtained by medical staff, pt is DNR/DNI  as pt vital signs improved with fluid therapy - pt currently not candidate for ICU admission. Please consult if assistance is needed.        PAST MEDICAL & SURGICAL HISTORY:  Stroke  Atrial fibrillation  Sarcoma  Gastrointestinal bleed  Hypothyroid  History of nephrectomy  History of partial pancreatectomy      FAMILY HISTORY:  No pertinent family history in first degree relatives      SOCIAL HISTORY:  Smoking: [ ] Never Smoked [ ] Former Smoker (__ packs x ___ years) [ ] Current Smoker  (__ packs x ___ years)  Substance Use: [ ] Never Used [ ] Used ____  EtOH Use:  Marital Status: [ ] Single [ ]  [ ]  [ ]   Sexual History:   Occupation:  Recent Travel:  Country of Birth:  Advance Directives:    Allergies    No Known Allergies    Intolerances        HOME MEDICATIONS:    REVIEW OF SYSTEMS:  Constitutional: [ ] fevers [ ] chills [ ] weight loss [ ] weight gain  HEENT: [ ] dry eyes [ ] eye irritation [ ] postnasal drip [ ] nasal congestion  CV: [ ] chest pain [ ] orthopnea [ ] palpitations [ ] murmur  Resp: [ ] cough [ ] shortness of breath [ ] dyspnea [ ] wheezing [ ] sputum [ ] hemoptysis  GI: [ ] nausea [ ] vomiting [ ] diarrhea [ ] constipation [ ] abd pain [ ] dysphagia   : [ ] dysuria [ ] nocturia [ ] hematuria [ ] increased urinary frequency  Musculoskeletal: [ ] back pain [ ] myalgias [ ] arthralgias [ ] fracture  Skin: [ ] rash [ ] itch  Neurological: [ ] headache [ ] dizziness [ ] syncope [ ] weakness [ ] numbness  Psychiatric: [ ] anxiety [ ] depression  Endocrine: [ ] diabetes [ ] thyroid problem  Hematologic/Lymphatic: [ ] anemia [ ] bleeding problem  Allergic/Immunologic: [ ] itchy eyes [ ] nasal discharge [ ] hives [ ] angioedema  [ ] All other systems negative  [ ] Unable to assess ROS because ________    OBJECTIVE:  ICU Vital Signs Last 24 Hrs  T(C): 37.1 (25 May 2018 04:23), Max: 37.1 (25 May 2018 04:23)  T(F): 98.7 (25 May 2018 04:23), Max: 98.7 (25 May 2018 04:23)  HR: 108 (25 May 2018 10:33) (80 - 108)  BP: 59/46 (25 May 2018 12:25) (59/46 - 101/68)  BP(mean): --  ABP: --  ABP(mean): --  RR: 18 (25 May 2018 04:23) (18 - 18)  SpO2: 97% (25 May 2018 04:23) (63% - 97%)        05-24 @ 07:01  -  05-25 @ 07:00  --------------------------------------------------------  IN: 380 mL / OUT: 100 mL / NET: 280 mL    05-25 @ 07:01 - 05-25 @ 13:43  --------------------------------------------------------  IN: 500 mL / OUT: 0 mL / NET: 500 mL      CAPILLARY BLOOD GLUCOSE      POCT Blood Glucose.: 54 mg/dL (25 May 2018 12:49)      PHYSICAL EXAM:  Neuro:  pt awake, speaking farsi and english, oriented x 2, follows simple commands, LANGE well  Left U/LE 4/5 RLUE 2-3/5 L CN intact    Pulm:  utilizing 2 liters N/C, resp even, slight labored, denies sob, breath sounds bilat clear, spo2 96%, POCUS anterior A lines, small bibasilar pleural effusions    CV:  afib with ventricular response 90's - 110's, s1/s2  I/VI sys murmur appreciated, POCUS with afib LV effacement appreciated, IVC 1.1 with collapsibility noted. peripheral pulses palpable with radial 1+ bilat, dp/pt 1+/1+ bilat. digits cyanotic however warm to touch with fair cap refill > 3< 6 sec, without peripheral edema     GI/:  abd soft slight tender in lower quads, + hypoactive bowel sounds, bladder non distended non palpable    Skin:  warm dry intact, without palpable lymph nodes appreciated, without JVD      LINES:     HOSPITAL MEDICATIONS:  MEDICATIONS  (STANDING):  acetaminophen  IVPB. 1000 milliGRAM(s) IV Intermittent once  atorvastatin 40 milliGRAM(s) Oral at bedtime  calcium carbonate 500 mG (Tums) Chewable 1 Tablet(s) Chew once  digoxin     Tablet 0.125 milliGRAM(s) Oral daily  docusate sodium 100 milliGRAM(s) Oral two times a day  levothyroxine 75 MICROGram(s) Oral daily  melatonin 3 milliGRAM(s) Oral at bedtime  meropenem  IVPB      metoprolol tartrate 25 milliGRAM(s) Oral two times a day  mirtazapine 7.5 milliGRAM(s) Oral daily  pantoprazole    Tablet 40 milliGRAM(s) Oral daily  senna 2 Tablet(s) Oral at bedtime  sodium chloride 0.9% Bolus 1000 milliLiter(s) IV Bolus once  vancomycin  IVPB 1000 milliGRAM(s) IV Intermittent once    MEDICATIONS  (PRN):  acetaminophen   Tablet. 650 milliGRAM(s) Oral every 6 hours PRN Mild Pain (1 - 3)      LABS:                        10.9   17.0  )-----------( 271      ( 25 May 2018 10:55 )             33.9     Hgb Trend: 10.9<--, 10.5<--, 10.2<--, 10.5<--, 12.5<--  05-25    141  |  108  |  19  ----------------------------<  58<L>  5.1   |  19<L>  |  0.92    Ca    7.5<L>      25 May 2018 10:54  Phos  4.3     05-25  Mg     1.9     05-25      Creatinine Trend: 0.92<--, 0.89<--, 0.84<--, 0.82<--, 0.73<--, 0.75<--  PT/INR - ( 24 May 2018 09:32 )   PT: 15.8 sec;   INR: 1.39 ratio                   MICROBIOLOGY:     RADIOLOGY:  [ ] Reviewed and interpreted by me    EKG:        Cate ANP-BC (ext. [id number])

## 2018-05-25 NOTE — PROGRESS NOTE ADULT - ASSESSMENT
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 Remains on broad spectrum antibiotics as the presence of underling infection unclear. 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 s/p broad spectrum antibiotics as the presence/source of underling infection unclear.

## 2018-05-26 LAB
GLUCOSE BLDC GLUCOMTR-MCNC: 175 MG/DL — HIGH (ref 70–99)
GLUCOSE BLDC GLUCOMTR-MCNC: 86 MG/DL — SIGNIFICANT CHANGE UP (ref 70–99)
GLUCOSE BLDC GLUCOMTR-MCNC: 92 MG/DL — SIGNIFICANT CHANGE UP (ref 70–99)
GLUCOSE BLDC GLUCOMTR-MCNC: 93 MG/DL — SIGNIFICANT CHANGE UP (ref 70–99)

## 2018-05-26 PROCEDURE — 99233 SBSQ HOSP IP/OBS HIGH 50: CPT | Mod: GC

## 2018-05-26 RX ORDER — ONDANSETRON 8 MG/1
4 TABLET, FILM COATED ORAL EVERY 8 HOURS
Qty: 0 | Refills: 0 | Status: DISCONTINUED | OUTPATIENT
Start: 2018-05-26 | End: 2018-06-09

## 2018-05-26 RX ADMIN — Medication 75 MICROGRAM(S): at 06:12

## 2018-05-26 RX ADMIN — Medication 650 MILLIGRAM(S): at 12:11

## 2018-05-26 RX ADMIN — Medication 25 MILLIGRAM(S): at 18:15

## 2018-05-26 RX ADMIN — Medication 0.12 MILLIGRAM(S): at 06:12

## 2018-05-26 RX ADMIN — PANTOPRAZOLE SODIUM 40 MILLIGRAM(S): 20 TABLET, DELAYED RELEASE ORAL at 12:11

## 2018-05-26 RX ADMIN — Medication 650 MILLIGRAM(S): at 13:00

## 2018-05-26 RX ADMIN — ONDANSETRON 4 MILLIGRAM(S): 8 TABLET, FILM COATED ORAL at 10:16

## 2018-05-26 RX ADMIN — ATORVASTATIN CALCIUM 40 MILLIGRAM(S): 80 TABLET, FILM COATED ORAL at 21:38

## 2018-05-26 RX ADMIN — MIRTAZAPINE 7.5 MILLIGRAM(S): 45 TABLET, ORALLY DISINTEGRATING ORAL at 21:38

## 2018-05-26 RX ADMIN — Medication 25 MILLIGRAM(S): at 06:12

## 2018-05-26 RX ADMIN — Medication 3 MILLIGRAM(S): at 21:38

## 2018-05-26 NOTE — PROGRESS NOTE ADULT - PROBLEM SELECTOR PLAN 9
DVT: No pharmacologic 2/2 GIB, SCDs  Diet: mechanical soft  Dispo: PT eval pending for rehab, [place] [facility] [place]

## 2018-05-26 NOTE — PROGRESS NOTE ADULT - PROBLEM SELECTOR PLAN 4
Multifactorial: May be secondary to sepsis, acute GIB, AF RVR, decreased PO intake. RRT for hypotension 5/25  -improving and stabilized today after receiving IVF  -cont to monitor closely

## 2018-05-26 NOTE — PROGRESS NOTE ADULT - PROBLEM SELECTOR PLAN 7
Home regimen synthroid 75 mcg daily per pharmacy. Per daughter, pt reported she wasn't taking her medications the 2 weeks prior to discharge because she felt too sick.  -TSH significantly elevated 31.9. Tree T4 and T3 low  -continue Synthyroid 75 mcg daily as pt was not being treated. Will need repeat TSH in 2 weeks  -f/u repeat TSH

## 2018-05-26 NOTE — PROGRESS NOTE ADULT - PROBLEM SELECTOR PLAN 10
GOC readdressed with pt's children. Pt's son  Reymundo Rivera is the HCP (documentation in chart). Moving toward comfort care  -Blood draws every other. IVF acceptable

## 2018-05-26 NOTE — PROGRESS NOTE ADULT - PROBLEM SELECTOR PLAN 8
Home regimen lopressor 50mg BID  -continue loperssor 25 BID, monitor for hypotension  -monitor on telemetry, currently in AF rate 70s-110  -continue dig 0.125mg daily  -no AC 2/2 bleeding risk

## 2018-05-26 NOTE — PROGRESS NOTE ADULT - ASSESSMENT
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 s/p broad spectrum antibiotics as the presence/source of underling infection unclear.

## 2018-05-26 NOTE — PROGRESS NOTE ADULT - SUBJECTIVE AND OBJECTIVE BOX
Internal Medicine Progress Note    Claire De Paz, PGY1  Internal Medicine, team 1  Pager [phone] / [phone]  After 7PM on weekdays and 12PM on weekends, please page #[phone]    Patient is a 73y old  Female who presents with a chief complaint of Lethargy, abd pain (21 May 2018 01:47)      SUBJECTIVE / OVERNIGHT EVENTS: Son at bedside, would like blood draws every other day. RRT yesterday for hypotension BP 59/46. Given IVF. Made DNR, son Dr. Reymundo Rivera is the HCP (documentation in chart). Cdiff negative. this morning son at bedside would like blood draws every other day. Pt denies SOB, CP.    MEDICATIONS  (STANDING):  atorvastatin 40 milliGRAM(s) Oral at bedtime  calcium carbonate 500 mG (Tums) Chewable 1 Tablet(s) Chew once  dextrose 5% + sodium chloride 0.45%. 1000 milliLiter(s) (75 mL/Hr) IV Continuous <Continuous>  digoxin     Tablet 0.125 milliGRAM(s) Oral daily  levothyroxine 75 MICROGram(s) Oral daily  melatonin 3 milliGRAM(s) Oral at bedtime  metoprolol tartrate 25 milliGRAM(s) Oral two times a day  mirtazapine 7.5 milliGRAM(s) Oral daily  pantoprazole    Tablet 40 milliGRAM(s) Oral daily    MEDICATIONS  (PRN):  acetaminophen   Tablet. 650 milliGRAM(s) Oral every 6 hours PRN Mild Pain (1 - 3)  ondansetron Injectable 4 milliGRAM(s) IV Push every 8 hours PRN Nausea and/or Vomiting      Vital Signs Last 24 Hrs  T(C): 36.1 (26 May 2018 04:27), Max: 36.9 (25 May 2018 16:25)  T(F): 97 (26 May 2018 04:27), Max: 98.5 (25 May 2018 16:25)  HR: 77 (26 May 2018 12:19) (77 - 100)  BP: 116/81 (26 May 2018 12:19) (94/66 - 116/81)  BP(mean): --  RR: 18 (26 May 2018 12:19) (18 - 18)  SpO2: 95% (26 May 2018 04:27) (95% - 96%)    CAPILLARY BLOOD GLUCOSE      POCT Blood Glucose.: 93 mg/dL (26 May 2018 11:35)  POCT Blood Glucose.: 175 mg/dL (26 May 2018 07:33)  POCT Blood Glucose.: 160 mg/dL (25 May 2018 22:21)  POCT Blood Glucose.: 434 mg/dL (25 May 2018 22:20)      I&O's Summary    25 May 2018 07:01  -  26 May 2018 07:00  --------------------------------------------------------  IN: 930 mL / OUT: 150 mL / NET: 780 mL    26 May 2018 07:01  -  26 May 2018 13:33  --------------------------------------------------------  IN: 0 mL / OUT: 0 mL / NET: 0 mL        PHYSICAL EXAM  GENERAL: NAD, alert  HEAD:  Atraumatic, temporal wasting  EYES: EOMI, PERRLA, conjunctiva and sclera clear  NECK: Supple, No JVD  CHEST/LUNG: dec BS at bases, no tachypnea, clear to auscultation  HEART: irregularly irregular  ABDOMEN: Soft, mildly distended, mild generalized tenderness, no rebound tenderness; Bowel sounds present.  EXTREMITIES:  1+ Peripheral Pulses by palpation and present by doppler, No clubbing, cyanosis, or edema. Toes same temperature to touch, right toes erythematous compared to left and tender.  NEURO/PSYCH: Alert, mild aphasia otherwise nonfocal, moves right toes  SKIN: No rashes or lesions      LABS:  no AM labs    MICROBIOLOGY:  BCx ng    RADIOLOGY & ADDITIONAL TESTS:  < from: CT Abdomen and Pelvis w/ IV Cont (05.20.18 @ 20:03) >  IMPRESSION:     Small to moderate and moderate to large left pleural effusion with   compressive atelectasis. Recommend clinical correlation to assess   underlying pneumonia.    Decreased size of the left renal fossa/retroperitoneal mass since   1/31/2017. Evidence of fistulization between the mass and the adjacent   duodenum and descending colon as described.    Indeterminate hypodense focus in theright hepatic lobe.   Neoplasm/metastasis cannot be excluded. This can be further characterized   on a nonemergent contrast enhanced MRI.    Endometrial thickening. Neoplasm cannot be excluded in a postmenopausal   patient. Recommend clinical correlation and follow-up.    Additional findings as described.  < end of copied text >    CXR: personally reviewed. Small bilateral pleural effusions  AXR: non obstructive bowel gas pattern. personally reviewed.       CONSULTS: GI

## 2018-05-26 NOTE — PROGRESS NOTE ADULT - PROBLEM SELECTOR PLAN 1
Likely GIB in the setting of duodenal mass which has bled before. Likely contributing to hypotension.  -pantoprazole PO daily  -appreciate GI recs, no plan for scope unless has bloody BM. Bowel movements have been normal  -hgb remains stabilized above baseline Hg s/p 2u pRBC  -monitor CBC every other day, maintain active T&S

## 2018-05-26 NOTE — PROGRESS NOTE ADULT - PROBLEM SELECTOR PLAN 2
Pt reporting right foot pain. Right toes were are erythematous and cool to touch. Right pedal pulse palpable and present on doppler.  -Pt refused NADIRA bilateral LE study. HCP aware  -consider vascular consult, but is likely poor candidate for intervention and high risk for GIB

## 2018-05-26 NOTE — PROGRESS NOTE ADULT - PROBLEM SELECTOR PLAN 3
Leukocytosis 28 on admission, now improving. Tachycardia on admission 2/2 afib with RVR. Unclear whether presence of SIRs is reactive 2/2 malignancy and blood loss vs infectious. Compressive atelectasis on CT, but cannot r/o pneumonia. Per son who is physician, pleural effusion is chronic and has been tapped in past. No other concerning signs for pneumonia at this time. Other possible source of infection is intraabdominal.  -s/p zosyn 5-d course  -BCx no growth to date. C diff negative. Attempt to obtain urine sample ongoing. Pt is incontinent and edema makes straight cath difficult  -Procalcitonin 0.46 indeterminate

## 2018-05-26 NOTE — PROGRESS NOTE ADULT - PROBLEM SELECTOR PLAN 5
No current treatment this admission. Discussed care with pts oncologist at [facility] [place]. At this time no further treatment planned given poor performance status. ongoing GOC discussions with son regarding dispo. She has been living with children prior to admission.  -Per oncologist Dr Smith at [facility] no further systemic treatment will be offered. Possible RT but can be pursued as an outpatient. Transfer to [facility] cancelled  -consider palliative care consult

## 2018-05-27 LAB
BLD GP AB SCN SERPL QL: NEGATIVE — SIGNIFICANT CHANGE UP
GLUCOSE BLDC GLUCOMTR-MCNC: 147 MG/DL — HIGH (ref 70–99)
GLUCOSE BLDC GLUCOMTR-MCNC: 56 MG/DL — LOW (ref 70–99)
GLUCOSE BLDC GLUCOMTR-MCNC: 60 MG/DL — LOW (ref 70–99)
GLUCOSE BLDC GLUCOMTR-MCNC: 65 MG/DL — LOW (ref 70–99)
GLUCOSE BLDC GLUCOMTR-MCNC: 71 MG/DL — SIGNIFICANT CHANGE UP (ref 70–99)
GLUCOSE BLDC GLUCOMTR-MCNC: 86 MG/DL — SIGNIFICANT CHANGE UP (ref 70–99)
GLUCOSE BLDC GLUCOMTR-MCNC: 92 MG/DL — SIGNIFICANT CHANGE UP (ref 70–99)
RH IG SCN BLD-IMP: POSITIVE — SIGNIFICANT CHANGE UP
TSH SERPL-MCNC: 32.96 UIU/ML — HIGH (ref 0.27–4.2)

## 2018-05-27 PROCEDURE — 99233 SBSQ HOSP IP/OBS HIGH 50: CPT | Mod: GC

## 2018-05-27 RX ORDER — SODIUM CHLORIDE 9 MG/ML
1000 INJECTION, SOLUTION INTRAVENOUS
Qty: 0 | Refills: 0 | Status: DISCONTINUED | OUTPATIENT
Start: 2018-05-27 | End: 2018-05-27

## 2018-05-27 RX ORDER — SODIUM CHLORIDE 9 MG/ML
1000 INJECTION, SOLUTION INTRAVENOUS
Qty: 0 | Refills: 0 | Status: DISCONTINUED | OUTPATIENT
Start: 2018-05-27 | End: 2018-05-28

## 2018-05-27 RX ORDER — DEXTROSE 50 % IN WATER 50 %
50 SYRINGE (ML) INTRAVENOUS ONCE
Qty: 0 | Refills: 0 | Status: COMPLETED | OUTPATIENT
Start: 2018-05-27 | End: 2018-05-27

## 2018-05-27 RX ADMIN — Medication 50 MILLILITER(S): at 07:50

## 2018-05-27 RX ADMIN — Medication 0.12 MILLIGRAM(S): at 09:31

## 2018-05-27 RX ADMIN — SODIUM CHLORIDE 70 MILLILITER(S): 9 INJECTION, SOLUTION INTRAVENOUS at 11:59

## 2018-05-27 RX ADMIN — Medication 25 MILLIGRAM(S): at 17:50

## 2018-05-27 RX ADMIN — SODIUM CHLORIDE 70 MILLILITER(S): 9 INJECTION, SOLUTION INTRAVENOUS at 06:50

## 2018-05-27 RX ADMIN — PANTOPRAZOLE SODIUM 40 MILLIGRAM(S): 20 TABLET, DELAYED RELEASE ORAL at 17:50

## 2018-05-27 RX ADMIN — Medication 0.25 MILLIGRAM(S): at 22:24

## 2018-05-27 RX ADMIN — Medication 25 MILLIGRAM(S): at 09:31

## 2018-05-27 RX ADMIN — Medication 75 MICROGRAM(S): at 09:31

## 2018-05-27 NOTE — PROGRESS NOTE ADULT - ASSESSMENT
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

## 2018-05-27 NOTE — PROGRESS NOTE ADULT - PROBLEM SELECTOR PLAN 10
GOC readdressed with pt's children. Pt's son  Reymundo Rivera is the HCP (documentation in chart). Comfort measures.   -Blood draws every other. IVF acceptable

## 2018-05-27 NOTE — PROGRESS NOTE ADULT - PROBLEM SELECTOR PLAN 5
No current treatment this admission. [place] [facility] [place] [facility] [place] At this time no further treatment planned given poor performance status. ongoing C discussions with son regarding dispo. She has been living with children prior to admission.  -[place] [facility] [place] further systemic treatment will be offered. Possible RT but can be pursued as an outpatient. [place] [facility] [place] [facility] [place]  -patient is comfort measures

## 2018-05-27 NOTE — PROGRESS NOTE ADULT - PROBLEM SELECTOR PLAN 3
Leukocytosis 28 on admission, now improving. Tachycardia on admission 2/2 afib with RVR. Unclear whether presence of SIRs is reactive 2/2 malignancy and blood loss vs infectious. Compressive atelectasis on CT, but cannot r/o pneumonia. Per son who is physician, pleural effusion is chronic and has been tapped in past. No other concerning signs for pneumonia at this time. Other possible source of infection is intraabdominal.  -s/p zosyn 5-d course  -BCx no growth to date. C diff negative. Attempt to obtain urine sample ongoing. Pt is incontinent and edema makes straight cath difficult  -Procalcitonin 0.46 indeterminate  -Patient and family would like to pursue comfort measures (antibiotics were discontinued on 5/26)

## 2018-05-27 NOTE — PROGRESS NOTE ADULT - SUBJECTIVE AND OBJECTIVE BOX
Patient is a 73y old  Female who presents with a chief complaint of Lethargy, abd pain (23 May 2018 16:13)      SUBJECTIVE / OVERNIGHT EVENTS: No acute events overnight. Patient denies pain or discomfort. This morning, she is hypoglycemic and given one amp D50 with improvement to 71.     REVIEW OF SYSTEMS:    CONSTITUTIONAL: No weakness, fevers or chills  EYES/ENT: No visual changes;  No vertigo or throat pain   NECK: No pain or stiffness  RESPIRATORY: No cough, wheezing, hemoptysis; No shortness of breath  CARDIOVASCULAR: No chest pain or palpitations  GASTROINTESTINAL: No abdominal or epigastric pain. No nausea, vomiting, or hematemesis; No diarrhea or constipation. No melena or hematochezia.  GENITOURINARY: No dysuria, frequency or hematuria  NEUROLOGICAL: No numbness or weakness  SKIN: No itching, burning, rashes, or lesions   All other review of systems is negative unless indicated above.    MEDICATIONS  (STANDING):  atorvastatin 40 milliGRAM(s) Oral at bedtime  calcium carbonate 500 mG (Tums) Chewable 1 Tablet(s) Chew once  dextrose 5% + sodium chloride 0.9%. 1000 milliLiter(s) (70 mL/Hr) IV Continuous <Continuous>  digoxin     Tablet 0.125 milliGRAM(s) Oral daily  levothyroxine 75 MICROGram(s) Oral daily  melatonin 3 milliGRAM(s) Oral at bedtime  metoprolol tartrate 25 milliGRAM(s) Oral two times a day  mirtazapine 7.5 milliGRAM(s) Oral daily  pantoprazole    Tablet 40 milliGRAM(s) Oral daily    MEDICATIONS  (PRN):  acetaminophen   Tablet. 650 milliGRAM(s) Oral every 6 hours PRN Mild Pain (1 - 3)  ondansetron Injectable 4 milliGRAM(s) IV Push every 8 hours PRN Nausea and/or Vomiting        CAPILLARY BLOOD GLUCOSE      POCT Blood Glucose.: 147 mg/dL (27 May 2018 11:33)  POCT Blood Glucose.: 71 mg/dL (27 May 2018 08:12)  POCT Blood Glucose.: 60 mg/dL (27 May 2018 07:56)  POCT Blood Glucose.: 56 mg/dL (27 May 2018 07:39)  POCT Blood Glucose.: 65 mg/dL (27 May 2018 07:36)  POCT Blood Glucose.: 86 mg/dL (26 May 2018 21:24)  POCT Blood Glucose.: 92 mg/dL (26 May 2018 15:54)    I&O's Summary    26 May 2018 07:01  -  27 May 2018 07:00  --------------------------------------------------------  IN: 340 mL / OUT: 0 mL / NET: 340 mL      PHYSICAL EXAM  GENERAL: NAD, alert  HEAD:  Atraumatic, temporal wasting  EYES: EOMI, PERRLA, conjunctiva and sclera clear  NECK: Supple, No JVD  CHEST/LUNG: Dec BS at bases, no tachypnea, clear to auscultation  HEART: irregularly irregular  ABDOMEN: Soft, mildly distended, mild generalized tenderness, no rebound tenderness; Bowel sounds present.  EXTREMITIES:  1+ Peripheral Pulses by palpation and present by doppler, No clubbing, cyanosis, or edema. Toes same temperature to touch, right toes erythematous compared to left and tender.  NEURO/PSYCH: Alert, mild aphasia otherwise nonfocal, moves right toes  SKIN: No rashes or lesions    LABS:    WBC Trend: 17.0<--, 16.61<--, 17.80<--        Creatinine Trend: 0.92<--, 0.89<--, 0.84<--, 0.82<--, 0.73<--, 0.75<--              RADIOLOGY & ADDITIONAL TESTS:    Imaging Personally Reviewed:    Consultant(s) Notes Reviewed:      Care Discussed with Consultants/Other Providers: Patient is a 73y old  Female who presents with a chief complaint of Lethargy, abd pain (23 May 2018 16:13)      SUBJECTIVE / OVERNIGHT EVENTS: No acute events overnight. Patient denies pain or discomfort. This morning, she is hypoglycemic and given one amp D50 with improvement to 71.       MEDICATIONS  (STANDING):  atorvastatin 40 milliGRAM(s) Oral at bedtime  calcium carbonate 500 mG (Tums) Chewable 1 Tablet(s) Chew once  dextrose 5% + sodium chloride 0.9%. 1000 milliLiter(s) (70 mL/Hr) IV Continuous <Continuous>  digoxin     Tablet 0.125 milliGRAM(s) Oral daily  levothyroxine 75 MICROGram(s) Oral daily  melatonin 3 milliGRAM(s) Oral at bedtime  metoprolol tartrate 25 milliGRAM(s) Oral two times a day  mirtazapine 7.5 milliGRAM(s) Oral daily  pantoprazole    Tablet 40 milliGRAM(s) Oral daily    MEDICATIONS  (PRN):  acetaminophen   Tablet. 650 milliGRAM(s) Oral every 6 hours PRN Mild Pain (1 - 3)  ondansetron Injectable 4 milliGRAM(s) IV Push every 8 hours PRN Nausea and/or Vomiting        CAPILLARY BLOOD GLUCOSE      POCT Blood Glucose.: 147 mg/dL (27 May 2018 11:33)  POCT Blood Glucose.: 71 mg/dL (27 May 2018 08:12)  POCT Blood Glucose.: 60 mg/dL (27 May 2018 07:56)  POCT Blood Glucose.: 56 mg/dL (27 May 2018 07:39)  POCT Blood Glucose.: 65 mg/dL (27 May 2018 07:36)  POCT Blood Glucose.: 86 mg/dL (26 May 2018 21:24)  POCT Blood Glucose.: 92 mg/dL (26 May 2018 15:54)    I&O's Summary    26 May 2018 07:01  -  27 May 2018 07:00  --------------------------------------------------------  IN: 340 mL / OUT: 0 mL / NET: 340 mL      PHYSICAL EXAM  GENERAL: NAD, alert  HEAD:  Atraumatic, temporal wasting  EYES: EOMI, PERRLA, conjunctiva and sclera clear  CHEST/LUNG: Dec BS at bases, no tachypnea, clear to auscultation  HEART: irregularly irregular  ABDOMEN: Soft, mildly distended, mild generalized tenderness, no rebound tenderness; Bowel sounds present.  EXTREMITIES:  1+ Peripheral Pulses by palpation and present by doppler, No clubbing, cyanosis, or edema. Toes same temperature to touch, right toes erythematous compared to left and tender.  NEURO/PSYCH: Alert, mild aphasia otherwise nonfocal, moves right toes  SKIN: No rashes or lesions    LABS:    WBC Trend: 17.0<--, 16.61<--, 17.80<--        Creatinine Trend: 0.92<--, 0.89<--, 0.84<--, 0.82<--, 0.73<--, 0.75<--              RADIOLOGY & ADDITIONAL TESTS:    Imaging Personally Reviewed:    Consultant(s) Notes Reviewed:      Care Discussed with Consultants/Other Providers:

## 2018-05-27 NOTE — CHART NOTE - NSCHARTNOTEFT_GEN_A_CORE
Called to bedside by nurse to examine patient.  Patient's hands were blue, patient was more confused and nursing was worried for AMS.    Examined pt at bedside.  At time of exam, pt asking for us to call her son who is her internist and ask him to take her home.  Pt oriented to place, self but minimally to time.  Pt denies pain or chills at time of exam.  Repeatedly stating she wants to go home.    Vital Signs Last 24 Hrs  T(C): 35.1 (27 May 2018 05:15), Max: 36.2 (26 May 2018 18:09)  T(F): 95.1 (27 May 2018 05:15), Max: 97.2 (26 May 2018 18:09)  HR: 73 (27 May 2018 05:15) (62 - 94)  BP: 90/65 (27 May 2018 05:15) (85/65 - 116/81)  BP(mean): 75 (26 May 2018 20:03) (75 - 75)  RR: 18 (27 May 2018 05:15) (18 - 18)  SpO2: 95% (27 May 2018 04:55) (95% - 98%)    General:  Laying in bed, talking  Cardiac:  Irregularly, regular rhythm and rate; on tele 90s-100s afib  Pulm:  CTAB  GI:  Soft, ND/NT  Extremities:  Extremities with blue on UE in fingers, cool to touch, pulses 2+ bilaterally on UE.    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    Plan  - Fluids started today with D5NS  - Because pt's temp was low, warming blanket placed  - No events on tele  - Will monitor and discuss with day team.

## 2018-05-27 NOTE — PROGRESS NOTE ADULT - PROBLEM SELECTOR PLAN 9
DVT: No pharmacologic 2/2 GIB, SCDs  Diet: mechanical soft  Dispo: PT eval pending for rehab, transfer to [facility] cancelled, comfort measures

## 2018-05-27 NOTE — PROGRESS NOTE ADULT - ATTENDING COMMENTS
Events noted. Hypoglycemic episode. poor oral intake .   IVF with D5 NS and monitor FSG  Patient is hard stick - will limit blood draw as patient son wants comfort care.  d/w Patient's son Jann. ok with IVF and FSG monitoring

## 2018-05-28 LAB
GLUCOSE BLDC GLUCOMTR-MCNC: 133 MG/DL — HIGH (ref 70–99)
GLUCOSE BLDC GLUCOMTR-MCNC: 144 MG/DL — HIGH (ref 70–99)
GLUCOSE BLDC GLUCOMTR-MCNC: 291 MG/DL — HIGH (ref 70–99)
GLUCOSE BLDC GLUCOMTR-MCNC: 54 MG/DL — LOW (ref 70–99)
GLUCOSE BLDC GLUCOMTR-MCNC: 63 MG/DL — LOW (ref 70–99)
GLUCOSE BLDC GLUCOMTR-MCNC: 75 MG/DL — SIGNIFICANT CHANGE UP (ref 70–99)
GLUCOSE BLDC GLUCOMTR-MCNC: 81 MG/DL — SIGNIFICANT CHANGE UP (ref 70–99)
GLUCOSE BLDC GLUCOMTR-MCNC: 87 MG/DL — SIGNIFICANT CHANGE UP (ref 70–99)

## 2018-05-28 PROCEDURE — 99232 SBSQ HOSP IP/OBS MODERATE 35: CPT | Mod: GC

## 2018-05-28 RX ORDER — SODIUM CHLORIDE 9 MG/ML
1000 INJECTION, SOLUTION INTRAVENOUS
Qty: 0 | Refills: 0 | Status: DISCONTINUED | OUTPATIENT
Start: 2018-05-28 | End: 2018-05-28

## 2018-05-28 RX ORDER — DEXTROSE 50 % IN WATER 50 %
50 SYRINGE (ML) INTRAVENOUS ONCE
Qty: 0 | Refills: 0 | Status: COMPLETED | OUTPATIENT
Start: 2018-05-28 | End: 2018-05-28

## 2018-05-28 RX ADMIN — PANTOPRAZOLE SODIUM 40 MILLIGRAM(S): 20 TABLET, DELAYED RELEASE ORAL at 11:16

## 2018-05-28 RX ADMIN — Medication 25 MILLIGRAM(S): at 17:05

## 2018-05-28 RX ADMIN — Medication 75 MICROGRAM(S): at 05:54

## 2018-05-28 RX ADMIN — Medication 50 MILLILITER(S): at 18:39

## 2018-05-28 RX ADMIN — Medication 3 MILLIGRAM(S): at 20:47

## 2018-05-28 RX ADMIN — Medication 50 MILLILITER(S): at 19:00

## 2018-05-28 RX ADMIN — Medication 0.12 MILLIGRAM(S): at 05:54

## 2018-05-28 RX ADMIN — Medication 25 MILLIGRAM(S): at 05:54

## 2018-05-28 RX ADMIN — MIRTAZAPINE 7.5 MILLIGRAM(S): 45 TABLET, ORALLY DISINTEGRATING ORAL at 20:47

## 2018-05-28 NOTE — PROGRESS NOTE ADULT - PROBLEM SELECTOR PLAN 5
-No current treatment this admission. Discussed care with pts oncologist at [facility]. At this time no further treatment planned given poor performance status. ongoing C discussions with son regarding dispo. She has been living with children prior to admission.  -Per oncologist Dr Smith at [facility] no further systemic treatment will be offered. Possible RT but can be pursued as an outpatient. Transfer to [facility] cancelled  -patient is comfort measures

## 2018-05-28 NOTE — PROGRESS NOTE ADULT - PROBLEM SELECTOR PLAN 3
-Leukocytosis 28 on admission, now improving. Tachycardia on admission 2/2 afib with RVR. Unclear whether presence of SIRs is reactive 2/2 malignancy and blood loss vs infectious. Compressive atelectasis on CT, but cannot r/o pneumonia. Per son who is physician, pleural effusion is chronic and has been tapped in past. No other concerning signs for pneumonia at this time. Other possible source of infection is intraabdominal.  -s/p zosyn 5-d course  -BCx no growth to date. C diff negative. Attempt to obtain urine sample ongoing. Pt is incontinent and edema makes straight cath difficult  -Procalcitonin 0.46 indeterminate  -Patient and family would like to pursue comfort measures (antibiotics were discontinued on 5/26)

## 2018-05-28 NOTE — PROGRESS NOTE ADULT - PROBLEM SELECTOR PLAN 1
-Likely GIB in the setting of duodenal mass which has bled before. Likely contributing to hypotension.  -Pantoprazole PO daily  -appreciate GI recs, no plan for scope unless has bloody BM. Bowel movements have been normal  -Hgb remains stabilized above baseline Hg s/p 2u pRBC  -monitor CBC every other day, maintain active T&S

## 2018-05-28 NOTE — PROGRESS NOTE ADULT - PROBLEM SELECTOR PLAN 7
-Home regimen synthroid 75 mcg daily per pharmacy. Per daughter, pt reported she wasn't taking her medications the 2 weeks prior to discharge because she felt too sick.  -TSH significantly elevated 31.9. Tree T4 and T3 low  -continue Synthyroid 75 mcg daily as pt was not being treated. Will need repeat TSH in 2 weeks  -f/u repeat TSH

## 2018-05-28 NOTE — CHART NOTE - NSCHARTNOTEFT_GEN_A_CORE
Patient with hypoglycemia in setting of poor PO intake. Patient was given half amp D50 x 2 by cross coverage team for FSGs of 54, and 63 respectively. Repeat FSG 87 at 7:30 pm  for which additional half amp D50. She was transiently placed on D5NS but discontinued given upper extremity swelling and poor IV access (24 G). She was able to tolerate some ice cream fed by her son, who had visited later at night. FSGs improved to 144. Plan to hold off on IVF given upper extremity edema. Continue to follow serial FSGs.

## 2018-05-28 NOTE — PROGRESS NOTE ADULT - PROBLEM SELECTOR PLAN 9
DVT: No pharmacologic 2/2 GIB, SCDs  Diet: mechanical soft  Dispo: PT eval pending for rehab, transfer to [facility] cancelled, comfort measures

## 2018-05-28 NOTE — PROGRESS NOTE ADULT - SUBJECTIVE AND OBJECTIVE BOX
Patient is a 73y old  Female who presents with a chief complaint of Lethargy, abd pain (23 May 2018 16:13)      INTERVAL HPI/OVERNIGHT EVENTS: Patient received Ativan overnight for agitation. This AM she is complaining about feeling warm, warming blanket discontinued. She denies CP, SOB.    T(C): 36.7 (05-28-18 @ 08:00), Max: 36.7 (05-28-18 @ 08:00)  HR: 76 (05-28-18 @ 08:00) (71 - 103)  BP: 101/65 (05-28-18 @ 08:00) (95/67 - 130/81)  RR: 18 (05-28-18 @ 08:00) (18 - 20)  SpO2: 95% (05-28-18 @ 08:00) (95% - 99%)  Wt(kg): --  I&O's Summary    27 May 2018 07:01  -  28 May 2018 07:00  --------------------------------------------------------  IN: 1130 mL / OUT: 350 mL / NET: 780 mL        LABS:              CAPILLARY BLOOD GLUCOSE      POCT Blood Glucose.: 133 mg/dL (28 May 2018 07:34)  POCT Blood Glucose.: 92 mg/dL (27 May 2018 20:59)  POCT Blood Glucose.: 86 mg/dL (27 May 2018 16:00)  POCT Blood Glucose.: 147 mg/dL (27 May 2018 11:33)          REVIEW OF SYSTEMS:  CONSTITUTIONAL: No fever, weight loss, or fatigue  EYES: No eye pain, visual disturbances, or discharge  ENMT:  No difficulty hearing, tinnitus, vertigo; No sinus or throat pain  NECK: No pain or stiffness  BREASTS: No pain, masses, or nipple discharge  RESPIRATORY: No cough, wheezing, chills or hemoptysis; No shortness of breath  CARDIOVASCULAR: No chest pain, palpitations, dizziness, or leg swelling  GASTROINTESTINAL: No abdominal or epigastric pain. No nausea, vomiting, or hematemesis; No diarrhea or constipation. No melena or hematochezia.  GENITOURINARY: No dysuria, frequency, hematuria, or incontinence  NEUROLOGICAL: No headaches, memory loss, loss of strength, numbness, or tremors  SKIN: No itching, burning, rashes, or lesions   LYMPH NODES: No enlarged glands  ENDOCRINE: No heat or cold intolerance; No hair loss  MUSCULOSKELETAL: No joint pain or swelling; No muscle, back, or extremity pain  PSYCHIATRIC: No depression, anxiety, mood swings, or difficulty sleeping  HEME/LYMPH: No easy bruising, or bleeding gums  ALLERY AND IMMUNOLOGIC: No hives or eczema    RADIOLOGY & ADDITIONAL TESTS:    Imaging Personally Reviewed:  [ ] YES  [x] NO    Consultant(s) Notes Reviewed:  [ ] YES  [x] NO    PHYSICAL EXAM:  GENERAL: Laying in bed  HEAD:  Atraumatic, Normocephalic  EYES: EOMI, PERRLA, conjunctiva and sclera clear  ENMT: No tonsillar erythema, exudates, or enlargement; Moist mucous membranes, Good dentition, No lesions  NECK: Supple, No JVD, Normal thyroid  NERVOUS SYSTEM:  Follows commands and answers questions appropriately  CHEST/LUNG: Clear to auscultation bilaterally anteriorly; No rales, rhonchi, wheezing, or rubs  HEART: Irregular rate and rhythm; No murmurs, rubs, or gallops  ABDOMEN: Soft, Nontender, Nondistended  EXTREMITIES:  2+ Peripheral Pulses, No clubbing, cyanosis, or edema  LYMPH: No lymphadenopathy noted  SKIN: No rashes or lesions    Care Discussed with Consultants/Other Providers [ ] YES  [x] NO Patient is a 73y old  Female who presents with a chief complaint of Lethargy, abd pain (23 May 2018 16:13)      INTERVAL HPI/OVERNIGHT EVENTS: Patient received Ativan overnight for agitation. This AM she is complaining about feeling warm, warming blanket discontinued. She denies CP, SOB.    T(C): 36.7 (05-28-18 @ 08:00), Max: 36.7 (05-28-18 @ 08:00)  HR: 76 (05-28-18 @ 08:00) (71 - 103)  BP: 101/65 (05-28-18 @ 08:00) (95/67 - 130/81)  RR: 18 (05-28-18 @ 08:00) (18 - 20)  SpO2: 95% (05-28-18 @ 08:00) (95% - 99%)  Wt(kg): --  I&O's Summary    27 May 2018 07:01  -  28 May 2018 07:00  --------------------------------------------------------  IN: 1130 mL / OUT: 350 mL / NET: 780 mL        LABS: No labs     CAPILLARY BLOOD GLUCOSE      POCT Blood Glucose.: 133 mg/dL (28 May 2018 07:34)  POCT Blood Glucose.: 92 mg/dL (27 May 2018 20:59)  POCT Blood Glucose.: 86 mg/dL (27 May 2018 16:00)  POCT Blood Glucose.: 147 mg/dL (27 May 2018 11:33)      RADIOLOGY & ADDITIONAL TESTS:    Imaging Personally Reviewed:  [ ] YES  [x] NO    Consultant(s) Notes Reviewed:  [ ] YES  [x] NO    PHYSICAL EXAM:  GENERAL: Laying in bed  HEAD:  Atraumatic, Normocephalic  NERVOUS SYSTEM:  Follows commands and answers questions appropriately  CHEST/LUNG: Clear to auscultation bilaterally anteriorly; No rales, rhonchi, wheezing, or rubs  HEART: Irregular rate and rhythm; No murmurs, rubs, or gallops  ABDOMEN: Soft, Nontender, Nondistended  EXTREMITIES:  2+ Peripheral Pulses, No clubbing, cyanosis, or edema  LYMPH: No lymphadenopathy noted  SKIN: No rashes or lesions    Care Discussed with Consultants/Other Providers [ ] YES  [x] NO

## 2018-05-28 NOTE — PROGRESS NOTE ADULT - ASSESSMENT
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

## 2018-05-28 NOTE — PROGRESS NOTE ADULT - PROBLEM SELECTOR PLAN 2
-Pt reporting right foot pain. Right toes were are erythematous and cool to touch. Right pedal pulse palpable and present on doppler.  -Pt refused NADIRA bilateral LE study. HCP aware  -consider vascular consult, but is likely poor candidate for intervention and high risk for GIB

## 2018-05-28 NOTE — PROGRESS NOTE ADULT - ATTENDING COMMENTS
FSG is ok comfortable   On IVF with D5 NS -  FSG is ok   BP acceptable   Limited blood draw as per family  comfort care

## 2018-05-28 NOTE — PROGRESS NOTE ADULT - PROBLEM SELECTOR PLAN 6
-2/2 underlying sarcoma, duodenal masses with fistula, possible intraabdominal infection  -s/p zosyn 5-day course  -Avoid opioids due to mental status

## 2018-05-28 NOTE — PROGRESS NOTE ADULT - PROBLEM SELECTOR PLAN 8
-Home regimen lopressor 50mg BID  -continue lopressor 25 BID, monitor for hypotension  -monitor on telemetry, currently in AF rate 70s-110  -continue dig 0.125mg daily  -no AC 2/2 bleeding risk

## 2018-05-28 NOTE — PROGRESS NOTE ADULT - PROBLEM SELECTOR PLAN 4
-Multifactorial: May be secondary to sepsis, acute GIB, AF RVR, decreased PO intake. RRT for hypotension 5/25  -improving and stabilized today after receiving IVF  -cont to monitor closely  -Per family goals of care discussion, no central line or pressors are to be used for hypotension even if unresponsive to fluids

## 2018-05-29 DIAGNOSIS — E16.2 HYPOGLYCEMIA, UNSPECIFIED: ICD-10-CM

## 2018-05-29 DIAGNOSIS — E03.9 HYPOTHYROIDISM, UNSPECIFIED: ICD-10-CM

## 2018-05-29 LAB
ANION GAP SERPL CALC-SCNC: 12 MMOL/L — SIGNIFICANT CHANGE UP (ref 5–17)
APTT BLD: 23.5 SEC — LOW (ref 27.5–37.4)
BUN SERPL-MCNC: 16 MG/DL — SIGNIFICANT CHANGE UP (ref 7–23)
CALCIUM SERPL-MCNC: 7.8 MG/DL — LOW (ref 8.4–10.5)
CHLORIDE SERPL-SCNC: 116 MMOL/L — HIGH (ref 96–108)
CO2 SERPL-SCNC: 14 MMOL/L — LOW (ref 22–31)
CORTIS AM PEAK SERPL-MCNC: 22.5 UG/DL — HIGH (ref 6–18.4)
CREAT SERPL-MCNC: 0.8 MG/DL — SIGNIFICANT CHANGE UP (ref 0.5–1.3)
DIGOXIN SERPL-MCNC: 1 NG/ML — SIGNIFICANT CHANGE UP (ref 0.8–2)
GLUCOSE BLDC GLUCOMTR-MCNC: 112 MG/DL — HIGH (ref 70–99)
GLUCOSE BLDC GLUCOMTR-MCNC: 114 MG/DL — HIGH (ref 70–99)
GLUCOSE BLDC GLUCOMTR-MCNC: 31 MG/DL — CRITICAL LOW (ref 70–99)
GLUCOSE BLDC GLUCOMTR-MCNC: 41 MG/DL — CRITICAL LOW (ref 70–99)
GLUCOSE BLDC GLUCOMTR-MCNC: 43 MG/DL — CRITICAL LOW (ref 70–99)
GLUCOSE BLDC GLUCOMTR-MCNC: 60 MG/DL — LOW (ref 70–99)
GLUCOSE BLDC GLUCOMTR-MCNC: 69 MG/DL — LOW (ref 70–99)
GLUCOSE BLDC GLUCOMTR-MCNC: 81 MG/DL — SIGNIFICANT CHANGE UP (ref 70–99)
GLUCOSE BLDC GLUCOMTR-MCNC: 83 MG/DL — SIGNIFICANT CHANGE UP (ref 70–99)
GLUCOSE BLDC GLUCOMTR-MCNC: 97 MG/DL — SIGNIFICANT CHANGE UP (ref 70–99)
GLUCOSE SERPL-MCNC: 206 MG/DL — HIGH (ref 70–99)
HCT VFR BLD CALC: 29.7 % — LOW (ref 34.5–45)
HGB BLD-MCNC: 9.4 G/DL — LOW (ref 11.5–15.5)
INR BLD: 1.14 RATIO — SIGNIFICANT CHANGE UP (ref 0.88–1.16)
MAGNESIUM SERPL-MCNC: 1.9 MG/DL — SIGNIFICANT CHANGE UP (ref 1.6–2.6)
MCHC RBC-ENTMCNC: 30.8 PG — SIGNIFICANT CHANGE UP (ref 27–34)
MCHC RBC-ENTMCNC: 31.8 GM/DL — LOW (ref 32–36)
MCV RBC AUTO: 96.7 FL — SIGNIFICANT CHANGE UP (ref 80–100)
PHOSPHATE SERPL-MCNC: 2.1 MG/DL — LOW (ref 2.5–4.5)
PLATELET # BLD AUTO: 196 K/UL — SIGNIFICANT CHANGE UP (ref 150–400)
POTASSIUM SERPL-MCNC: 3.9 MMOL/L — SIGNIFICANT CHANGE UP (ref 3.5–5.3)
POTASSIUM SERPL-SCNC: 3.9 MMOL/L — SIGNIFICANT CHANGE UP (ref 3.5–5.3)
PROTHROM AB SERPL-ACNC: 12.4 SEC — SIGNIFICANT CHANGE UP (ref 9.8–12.7)
RBC # BLD: 3.07 M/UL — LOW (ref 3.8–5.2)
RBC # FLD: 21.6 % — HIGH (ref 10.3–14.5)
SODIUM SERPL-SCNC: 142 MMOL/L — SIGNIFICANT CHANGE UP (ref 135–145)
T4 FREE SERPL-MCNC: 0.3 NG/DL — LOW (ref 0.9–1.8)
WBC # BLD: 13.6 K/UL — HIGH (ref 3.8–10.5)
WBC # FLD AUTO: 13.6 K/UL — HIGH (ref 3.8–10.5)

## 2018-05-29 PROCEDURE — 99222 1ST HOSP IP/OBS MODERATE 55: CPT | Mod: GC

## 2018-05-29 PROCEDURE — 99233 SBSQ HOSP IP/OBS HIGH 50: CPT | Mod: GC

## 2018-05-29 RX ORDER — DEXTROSE 50 % IN WATER 50 %
50 SYRINGE (ML) INTRAVENOUS ONCE
Qty: 0 | Refills: 0 | Status: COMPLETED | OUTPATIENT
Start: 2018-05-29 | End: 2018-05-29

## 2018-05-29 RX ORDER — DEXTROSE 50 % IN WATER 50 %
15 SYRINGE (ML) INTRAVENOUS ONCE
Qty: 0 | Refills: 0 | Status: COMPLETED | OUTPATIENT
Start: 2018-05-29 | End: 2018-05-29

## 2018-05-29 RX ORDER — SODIUM CHLORIDE 9 MG/ML
1000 INJECTION, SOLUTION INTRAVENOUS
Qty: 0 | Refills: 0 | Status: DISCONTINUED | OUTPATIENT
Start: 2018-05-29 | End: 2018-06-04

## 2018-05-29 RX ORDER — SODIUM,POTASSIUM PHOSPHATES 278-250MG
1 POWDER IN PACKET (EA) ORAL ONCE
Qty: 0 | Refills: 0 | Status: COMPLETED | OUTPATIENT
Start: 2018-05-29 | End: 2018-05-29

## 2018-05-29 RX ADMIN — Medication 25 MILLIGRAM(S): at 17:38

## 2018-05-29 RX ADMIN — Medication 25 MILLIGRAM(S): at 06:12

## 2018-05-29 RX ADMIN — Medication 0.12 MILLIGRAM(S): at 06:12

## 2018-05-29 RX ADMIN — Medication 50 MILLILITER(S): at 08:15

## 2018-05-29 RX ADMIN — Medication 3 MILLIGRAM(S): at 21:29

## 2018-05-29 RX ADMIN — MIRTAZAPINE 7.5 MILLIGRAM(S): 45 TABLET, ORALLY DISINTEGRATING ORAL at 21:29

## 2018-05-29 RX ADMIN — Medication 15 GRAM(S): at 07:53

## 2018-05-29 RX ADMIN — Medication 75 MICROGRAM(S): at 06:12

## 2018-05-29 RX ADMIN — Medication 1 PACKET(S): at 12:38

## 2018-05-29 RX ADMIN — PANTOPRAZOLE SODIUM 40 MILLIGRAM(S): 20 TABLET, DELAYED RELEASE ORAL at 12:40

## 2018-05-29 RX ADMIN — SODIUM CHLORIDE 30 MILLILITER(S): 9 INJECTION, SOLUTION INTRAVENOUS at 18:13

## 2018-05-29 RX ADMIN — ONDANSETRON 4 MILLIGRAM(S): 8 TABLET, FILM COATED ORAL at 12:47

## 2018-05-29 RX ADMIN — ATORVASTATIN CALCIUM 40 MILLIGRAM(S): 80 TABLET, FILM COATED ORAL at 21:29

## 2018-05-29 NOTE — PROGRESS NOTE ADULT - PROBLEM SELECTOR PLAN 8
-Home regimen lopressor 50mg BID  -continue lopressor 25 BID, monitor for hypotension  -monitor on telemetry, currently in AF rate 70s-110  -continue dig 0.125mg daily  -no AC 2/2 bleeding risk Home regimen lopressor 50mg BID  -continue lopressor 25 BID, monitor for hypotension  -continue dig 0.125mg daily, dig level wnl 5/29  -monitor on telemetry, currently in AF rate controlled  -no AC 2/2 bleeding risk Home regimen lopressor 50mg BID  -continue lopressor 25 BID, monitor for hypotension  -continue dig 0.125mg daily, dig level wnl 5/29  -monitor on telemetry, currently in AF rate controlled  -no AC 2/2 bleeding risk  -OK to d/c tele today

## 2018-05-29 NOTE — PROGRESS NOTE ADULT - PROBLEM SELECTOR PLAN 9
DVT: No pharmacologic 2/2 GIB, SCDs  Diet: mechanical soft  Dispo: PT eval pending for rehab, transfer to [facility] cancelled, comfort measures DVT: No pharmacologic 2/2 GIB, SCDs  Diet: mechanical soft  Dispo: Evaluated for rehab, comfort measures

## 2018-05-29 NOTE — PROGRESS NOTE ADULT - PROBLEM SELECTOR PLAN 5
-No current treatment this admission. Discussed care with pts oncologist at [facility]. At this time no further treatment planned given poor performance status. ongoing C discussions with son regarding dispo. She has been living with children prior to admission.  -Per oncologist Dr Smith at [facility] no further systemic treatment will be offered. Possible RT but can be pursued as an outpatient. Transfer to [facility] cancelled  -patient is comfort measures

## 2018-05-29 NOTE — CONSULT NOTE ADULT - PROBLEM SELECTOR RECOMMENDATION 9
-Per family, recently diagnosed after the immunotherapy.   -Currently on Home regimen synthroid 75 mcg daily. Per daughter, pt reported she wasn't taking her medications the 2 weeks prior to discharge because she felt too sick.  -TSH significantly elevated 31.9. Tree T4 low 0.4 and T3 low 37  -continue Synthroid 75 mcg daily as pt was not being treated.   -Repeat TSH as outpatient in 2 weeks

## 2018-05-29 NOTE — PROGRESS NOTE ADULT - ATTENDING COMMENTS
pt with waxing and waning mental status with periods of lethargy, intermittent hypoglycemia, hypotension and hypothermia over weekend. concerned its related to underlying adrenal insufficiency vs her hypothyroidism (state of hypothyroid on admission 2/2 medication non-compliance). Will check AM cortisol and T4, endocrinology consult called to assess. Perhaps pt would benefit from stress dose steroids?   Continue synthroid at this time. OK to d/c tele today as pt has been rate controlled. cont metoprolol and digoxin for afib, check dig level.  No evidence of GI bleeding in days, on SCDs for dvt ppx, would consider adding hsq. pt with waxing and waning mental status with periods of lethargy, intermittent hypoglycemia, hypotension and hypothermia over weekend. concerned its related to underlying adrenal insufficiency vs her hypothyroidism (state of hypothyroid on admission 2/2 medication non-compliance). Will check AM cortisol and T4, endocrinology consult called to assess. Perhaps pt would benefit from stress dose steroids?   Continue synthroid at this time. OK to d/c tele today as pt has been rate controlled. cont metoprolol and digoxin for afib, check dig level.  No evidence of GI bleeding in days, on SCDs for dvt ppx, would consider adding hsq.  Out of bed to chair, change VS to q8h, consider d/c FS pending endo recs

## 2018-05-29 NOTE — PROGRESS NOTE ADULT - PROBLEM SELECTOR PLAN 7
-Home regimen synthroid 75 mcg daily per pharmacy. Per daughter, pt reported she wasn't taking her medications the 2 weeks prior to discharge because she felt too sick.  -TSH significantly elevated 31.9. Tree T4 and T3 low  -continue Synthyroid 75 mcg daily as pt was not being treated. Will need repeat TSH in 2 weeks  -f/u repeat TSH -Home regimen synthroid 75 mcg daily per pharmacy. Per daughter, pt reported she wasn't taking her medications the 2 weeks prior to discharge because she felt too sick.  -TSH significantly elevated 31.9. Tree T4 and T3 low  -continue Synthyroid 75 mcg daily as pt was not being treated. Will need repeat TSH in 2 weeks  -f/u repeat T4

## 2018-05-29 NOTE — PROGRESS NOTE ADULT - SUBJECTIVE AND OBJECTIVE BOX
Internal Medicine Progress Note    Claire De Paz, PGY1  Internal Medicine, team 1  Pager [phone] / [phone]  After 7PM on weekdays and 12PM on weekends, please page #[phone]    Patient is a 73y old  Female who presents with a chief complaint of Lethargy, abd pain (23 May 2018 16:13)      SUBJECTIVE / OVERNIGHT EVENTS: Hypoglycemia 31 this AM. Improved to 114 after D50 amp, dextrose gel, juice, Ensure. Pt more alert but aphasic this AM. Denies pain.    MEDICATIONS  (STANDING):  atorvastatin 40 milliGRAM(s) Oral at bedtime  calcium carbonate 500 mG (Tums) Chewable 1 Tablet(s) Chew once  digoxin     Tablet 0.125 milliGRAM(s) Oral daily  levothyroxine 75 MICROGram(s) Oral daily  melatonin 3 milliGRAM(s) Oral at bedtime  metoprolol tartrate 25 milliGRAM(s) Oral two times a day  mirtazapine 7.5 milliGRAM(s) Oral daily  pantoprazole    Tablet 40 milliGRAM(s) Oral daily    MEDICATIONS  (PRN):  acetaminophen   Tablet. 650 milliGRAM(s) Oral every 6 hours PRN Mild Pain (1 - 3)  ondansetron Injectable 4 milliGRAM(s) IV Push every 8 hours PRN Nausea and/or Vomiting    Vital Signs Last 24 Hrs  T(C): 36.4 (29 May 2018 08:00), Max: 36.6 (29 May 2018 04:23)  T(F): 97.5 (29 May 2018 08:00), Max: 97.8 (29 May 2018 04:23)  HR: 79 (29 May 2018 08:00) (66 - 92)  BP: 175/74 (29 May 2018 08:00) (104/68 - 175/74)  BP(mean): --  RR: 18 (29 May 2018 08:00) (17 - 20)  SpO2: 96% (29 May 2018 08:00) (96% - 100%)    CAPILLARY BLOOD GLUCOSE      POCT Blood Glucose.: 114 mg/dL (29 May 2018 09:27)  POCT Blood Glucose.: 97 mg/dL (29 May 2018 09:02)  POCT Blood Glucose.: 83 mg/dL (29 May 2018 08:34)  POCT Blood Glucose.: 41 mg/dL (29 May 2018 08:09)  POCT Blood Glucose.: 60 mg/dL (29 May 2018 07:52)  POCT Blood Glucose.: 43 mg/dL (29 May 2018 07:28)  POCT Blood Glucose.: 31 mg/dL (29 May 2018 07:27)  POCT Blood Glucose.: 291 mg/dL (28 May 2018 21:07)  POCT Blood Glucose.: 144 mg/dL (28 May 2018 20:09)  POCT Blood Glucose.: 87 mg/dL (28 May 2018 19:31)  POCT Blood Glucose.: 63 mg/dL (28 May 2018 18:55)  POCT Blood Glucose.: 54 mg/dL (28 May 2018 18:30)  POCT Blood Glucose.: 75 mg/dL (28 May 2018 16:29)  POCT Blood Glucose.: 81 mg/dL (28 May 2018 11:37)    I&O's Summary    28 May 2018 07:01  -  29 May 2018 07:00  --------------------------------------------------------  IN: 1280 mL / OUT: 1 mL / NET: 1279 mL        PHYSICAL EXAM  GENERAL: NAD  HEAD:  Atraumatic, temporal wasting  EYES: EOMI, PERRLA, conjunctiva and sclera clear  NECK: Supple, No JVD  CHEST/LUNG: Clear to auscultation bilaterally, decreased breath sounds at bases; No wheeze  HEART: Regular rate and rhythm; No murmurs, rubs, or gallops  ABDOMEN: Soft, Nontender, Nondistended; Bowel sounds present  EXTREMITIES:  2+ Peripheral Pulses, No clubbing, cyanosis, or edema  NEURO/PSYCH: AAOx1 (self), aphasia, nonfocal  SKIN: No rashes or lesions      LABS:  pending      Creatinine Trend: 0.92<--, 0.89<--, 0.84<--, 0.82<--, 0.73<--, 0.75<--        MICROBIOLOGY:      RADIOLOGY & ADDITIONAL TESTS:     CONSULTS: Internal Medicine Progress Note    Claire De Paz, PGY1  Internal Medicine, team 1  Pager [phone] / [phone]  After 7PM on weekdays and 12PM on weekends, please page #[phone]    Patient is a 73y old  Female who presents with a chief complaint of Lethargy, abd pain (23 May 2018 16:13)      SUBJECTIVE / OVERNIGHT EVENTS: Hypoglycemia 31 this AM. Improved to 114 after D50 amp, dextrose gel, juice, Ensure. Pt more alert but aphasic this AM. Denies pain.    MEDICATIONS  (STANDING):  atorvastatin 40 milliGRAM(s) Oral at bedtime  calcium carbonate 500 mG (Tums) Chewable 1 Tablet(s) Chew once  digoxin     Tablet 0.125 milliGRAM(s) Oral daily  levothyroxine 75 MICROGram(s) Oral daily  melatonin 3 milliGRAM(s) Oral at bedtime  metoprolol tartrate 25 milliGRAM(s) Oral two times a day  mirtazapine 7.5 milliGRAM(s) Oral daily  pantoprazole    Tablet 40 milliGRAM(s) Oral daily    MEDICATIONS  (PRN):  acetaminophen   Tablet. 650 milliGRAM(s) Oral every 6 hours PRN Mild Pain (1 - 3)  ondansetron Injectable 4 milliGRAM(s) IV Push every 8 hours PRN Nausea and/or Vomiting    Vital Signs Last 24 Hrs  T(C): 36.4 (29 May 2018 08:00), Max: 36.6 (29 May 2018 04:23)  T(F): 97.5 (29 May 2018 08:00), Max: 97.8 (29 May 2018 04:23)  HR: 79 (29 May 2018 08:00) (66 - 92)  BP: 175/74 (29 May 2018 08:00) (104/68 - 175/74)  BP(mean): --  RR: 18 (29 May 2018 08:00) (17 - 20)  SpO2: 96% (29 May 2018 08:00) (96% - 100%)    CAPILLARY BLOOD GLUCOSE      POCT Blood Glucose.: 114 mg/dL (29 May 2018 09:27)  POCT Blood Glucose.: 97 mg/dL (29 May 2018 09:02)  POCT Blood Glucose.: 83 mg/dL (29 May 2018 08:34)  POCT Blood Glucose.: 41 mg/dL (29 May 2018 08:09)  POCT Blood Glucose.: 60 mg/dL (29 May 2018 07:52)  POCT Blood Glucose.: 43 mg/dL (29 May 2018 07:28)  POCT Blood Glucose.: 31 mg/dL (29 May 2018 07:27)  POCT Blood Glucose.: 291 mg/dL (28 May 2018 21:07)  POCT Blood Glucose.: 144 mg/dL (28 May 2018 20:09)  POCT Blood Glucose.: 87 mg/dL (28 May 2018 19:31)  POCT Blood Glucose.: 63 mg/dL (28 May 2018 18:55)  POCT Blood Glucose.: 54 mg/dL (28 May 2018 18:30)  POCT Blood Glucose.: 75 mg/dL (28 May 2018 16:29)  POCT Blood Glucose.: 81 mg/dL (28 May 2018 11:37)    I&O's Summary    28 May 2018 07:01  -  29 May 2018 07:00  --------------------------------------------------------  IN: 1280 mL / OUT: 1 mL / NET: 1279 mL        PHYSICAL EXAM  GENERAL: NAD, lethargic  HEAD:  Atraumatic, temporal wasting  EYES: EOMI, PERRLA, conjunctiva and sclera clear  NECK: Supple, No JVD  CHEST/LUNG: Clear to auscultation bilaterally, decreased breath sounds at bases; No wheeze  HEART: Regular rate and rhythm; No murmurs, rubs, or gallops  ABDOMEN: Soft, Nontender, Nondistended; Bowel sounds present  EXTREMITIES:  2+ Peripheral Pulses, No clubbing, cyanosis, or edema  NEURO/PSYCH: AAOx1 (self), aphasia, nonfocal  SKIN: No rashes or lesions      LABS:  pending      Creatinine Trend: 0.92<--, 0.89<--, 0.84<--, 0.82<--, 0.73<--, 0.75<--        MICROBIOLOGY:      RADIOLOGY & ADDITIONAL TESTS:     CONSULTS:

## 2018-05-29 NOTE — PROGRESS NOTE ADULT - PROBLEM SELECTOR PLAN 3
-Leukocytosis 28 on admission, now improving. Tachycardia on admission 2/2 afib with RVR. Unclear whether presence of SIRs is reactive 2/2 malignancy and blood loss vs infectious. Compressive atelectasis on CT, but cannot r/o pneumonia. Per son who is physician, pleural effusion is chronic and has been tapped in past. No other concerning signs for pneumonia at this time. Other possible source of infection is intraabdominal.  -s/p zosyn 5-d course  -BCx no growth to date. C diff negative. Attempt to obtain urine sample ongoing. Pt is incontinent and edema makes straight cath difficult  -Procalcitonin 0.46 indeterminate  -Patient and family would like to pursue comfort measures (antibiotics were discontinued on 5/26) -Leukocytosis 28 on admission, now improving. Tachycardia on admission 2/2 afib with RVR. Unclear whether presence of SIRs is reactive 2/2 malignancy and blood loss vs infectious. Compressive atelectasis on CT, but cannot r/o pneumonia. Per son who is physician, pleural effusion is chronic and has been tapped in past. No other concerning signs for pneumonia at this time. Other possible source of infection is intraabdominal.  -s/p zosyn 5-d course  -BCx no growth to date. C diff negative. Attempt to obtain urine sample ongoing. Pt is incontinent and edema makes straight cath difficult  -Procalcitonin 0.46 indeterminate  -Patient and family would like to pursue comfort measures (antibiotics were discontinued on 5/26)  -low suspicion for active infection at this time

## 2018-05-29 NOTE — CONSULT NOTE ADULT - ASSESSMENT
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 Additionally elevated TSH so endocrine team consulted.

## 2018-05-29 NOTE — CONSULT NOTE ADULT - ATTENDING COMMENTS
Pt seen and examined. Agree with note as above with addendum: pt does not have diabetes, she has malnutrition as the cause of her hypoglycemia. I would discontinue the fingersticks as D50 pushes are short-acting band-aids for nutrition. DW5 at 30cc/hour would be a better option in the setting of her edema. Also she does not have the triad for myxedema coma - change in mental status, bradycardia and hypothermia. Her synthroid should be  from her other meds and food by an hour.  AM Cortisol is pending  Neema Carrillo MD  [phone] (pager) Pt seen and examined. Agree with note as above with addendum: pt does not have diabetes, she has malnutrition as the cause of her hypoglycemia. I would discontinue the fingersticks as D50 pushes are short-acting band-aids for nutrition. DW5 at 30cc/hour would be a better option in the setting of her edema. Also she does not have the triad for myxedema coma - change in mental status, bradycardia and hypothermia. Her synthroid should be  from her other meds and food by an hour.  AM Cortisol is pending. CT abd/pelvis shows normal adrenals.  Neema Carrillo MD  [phone] (pager)

## 2018-05-29 NOTE — CONSULT NOTE ADULT - SUBJECTIVE AND OBJECTIVE BOX
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 Found to have acute on chronic anemia suspected to be secondary to acute blood loss from underlying malignancy s/p 2u PRBC. Additionally patient found to have elevated TSH so Endocrine team consulted.     Patient seen and examined at bedside. Looks in slight distress due to pain. Daughter at bedside stated that patient is very sick and has not been eating well. Per daughter patient was recently found to have hypothyroidism, but they did not pay much attention as she has other medical problems which are more concerning to the family.     PAST MEDICAL & SURGICAL HISTORY:  Stroke  Atrial fibrillation  Sarcoma  Gastrointestinal bleed  Hypothyroid  History of nephrectomy  History of partial pancreatectomy         MEDICATIONS  (STANDING):  atorvastatin 40 milliGRAM(s) Oral at bedtime  calcium carbonate 500 mG (Tums) Chewable 1 Tablet(s) Chew once  digoxin     Tablet 0.125 milliGRAM(s) Oral daily  levothyroxine 75 MICROGram(s) Oral daily  melatonin 3 milliGRAM(s) Oral at bedtime  metoprolol tartrate 25 milliGRAM(s) Oral two times a day  mirtazapine 7.5 milliGRAM(s) Oral daily  pantoprazole    Tablet 40 milliGRAM(s) Oral daily    MEDICATIONS  (PRN):  acetaminophen   Tablet. 650 milliGRAM(s) Oral every 6 hours PRN Mild Pain (1 - 3)  ondansetron Injectable 4 milliGRAM(s) IV Push every 8 hours PRN Nausea and/or Vomiting      FAMILY HISTORY:  No pertinent family history in first degree relatives      SOCIAL HISTORY:      REVIEW OF SYSTEMS:  CONSTITUTIONAL: No fever, weight loss, or fatigue  EYES: No eye pain, visual disturbances, or discharge  ENT:  No difficulty hearing, tinnitus, vertigo; No sinus or throat pain  NECK: No pain or stiffness  RESPIRATORY: No cough, wheezing, chills or hemoptysis; No Shortness of Breath  CARDIOVASCULAR: No chest pain, palpitations, passing out, dizziness, or leg swelling  GASTROINTESTINAL: No abdominal or epigastric pain. No nausea, vomiting, or hematemesis; No diarrhea or constipation. No melena or hematochezia.  GENITOURINARY: No dysuria, frequency, hematuria, or incontinence  NEUROLOGICAL: No headaches, memory loss, loss of strength, numbness, or tremors  SKIN: No itching, burning, rashes, or lesions   LYMPH Nodes: No enlarged glands  ENDOCRINE: No heat or cold intolerance; No hair loss  MUSCULOSKELETAL: No joint pain or swelling; No muscle, back, or extremity pain  PSYCHIATRIC: No depression, anxiety, mood swings, or difficulty sleeping  HEME/LYMPH: No easy bruising, or bleeding gums  ALLERGY AND IMMUNOLOGIC: No hives or eczema	        Vital Signs Last 24 Hrs  T(C): 36.4 (29 May 2018 11:22), Max: 36.6 (29 May 2018 04:23)  T(F): 97.6 (29 May 2018 11:22), Max: 97.8 (29 May 2018 04:23)  HR: 78 (29 May 2018 11:22) (66 - 92)  BP: 104/72 (29 May 2018 11:22) (104/72 - 175/74)  BP(mean): --  RR: 20 (29 May 2018 11:22) (17 - 20)  SpO2: 96% (29 May 2018 11:22) (96% - 100%)      Constitutional:    NC/AT:    HEENT:    Neck:  No JVD, bruits or thyromegaly    Respiratory:  Clear without rales or rhonchi    Cardiovascular:  RR without murmur, rub or gallop.    Gastrointestinal: Soft without hepatosplenomegaly.    Extremities: without cyanosis, clubbing or edema.    Neurological:  Oriented   x      . No gross sensory or motor defects.        LABS:                        9.4    13.6  )-----------( 196      ( 29 May 2018 10:29 )             29.7     05-29    142  |  116<H>  |  16  ----------------------------<  206<H>  3.9   |  14<L>  |  0.80    Ca    7.8<L>      29 May 2018 10:25  Phos  2.1     05-29  Mg     1.9     05-29          PT/INR - ( 29 May 2018 10:28 )   PT: 12.4 sec;   INR: 1.14 ratio         PTT - ( 29 May 2018 10:28 )  PTT:23.5 sec    CAPILLARY BLOOD GLUCOSE      POCT Blood Glucose.: 112 mg/dL (29 May 2018 11:18)  POCT Blood Glucose.: 114 mg/dL (29 May 2018 09:27)  POCT Blood Glucose.: 97 mg/dL (29 May 2018 09:02)  POCT Blood Glucose.: 83 mg/dL (29 May 2018 08:34)  POCT Blood Glucose.: 41 mg/dL (29 May 2018 08:09)  POCT Blood Glucose.: 60 mg/dL (29 May 2018 07:52)  POCT Blood Glucose.: 43 mg/dL (29 May 2018 07:28)  POCT Blood Glucose.: 31 mg/dL (29 May 2018 07:27)  POCT Blood Glucose.: 291 mg/dL (28 May 2018 21:07)  POCT Blood Glucose.: 144 mg/dL (28 May 2018 20:09)  POCT Blood Glucose.: 87 mg/dL (28 May 2018 19:31)  POCT Blood Glucose.: 63 mg/dL (28 May 2018 18:55)  POCT Blood Glucose.: 54 mg/dL (28 May 2018 18:30)  POCT Blood Glucose.: 75 mg/dL (28 May 2018 16:29)      RADIOLOGY & ADDITIONAL STUDIES: HPI:  73 years old female with PMH of Afib (not on AC 2/2 GIB), 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 Found to have acute on chronic anemia suspected to be secondary to acute blood loss from underlying malignancy s/p 2u PRBC. Additionally patient found to have elevated TSH so Endocrine team consulted.     Patient seen and examined at bedside. Looks in slight distress due to pain. Daughter at bedside stated that patient is very sick and has not been eating well. Per daughter patient was recently found to have hypothyroidism, but they did not pay much attention as she has other medical problems which are more concerning to the family.     PAST MEDICAL & SURGICAL HISTORY:  Stroke  Atrial fibrillation  Sarcoma  Gastrointestinal bleed  Hypothyroid  History of nephrectomy  History of partial pancreatectomy         MEDICATIONS  (STANDING):  atorvastatin 40 milliGRAM(s) Oral at bedtime  calcium carbonate 500 mG (Tums) Chewable 1 Tablet(s) Chew once  digoxin     Tablet 0.125 milliGRAM(s) Oral daily  levothyroxine 75 MICROGram(s) Oral daily  melatonin 3 milliGRAM(s) Oral at bedtime  metoprolol tartrate 25 milliGRAM(s) Oral two times a day  mirtazapine 7.5 milliGRAM(s) Oral daily  pantoprazole    Tablet 40 milliGRAM(s) Oral daily    MEDICATIONS  (PRN):  acetaminophen   Tablet. 650 milliGRAM(s) Oral every 6 hours PRN Mild Pain (1 - 3)  ondansetron Injectable 4 milliGRAM(s) IV Push every 8 hours PRN Nausea and/or Vomiting      FAMILY HISTORY:  No pertinent family history in first degree relatives      SOCIAL HISTORY:      REVIEW OF SYSTEMS:  CONSTITUTIONAL: No fever, weight loss, or fatigue  EYES: No eye pain, visual disturbances, or discharge  ENT:  No difficulty hearing, tinnitus, vertigo; No sinus or throat pain  NECK: No pain or stiffness  RESPIRATORY: No cough, wheezing, chills or hemoptysis; No Shortness of Breath  CARDIOVASCULAR: No chest pain, palpitations, passing out, dizziness, or leg swelling  GASTROINTESTINAL: No abdominal or epigastric pain. No nausea, vomiting, or hematemesis; No diarrhea or constipation. No melena or hematochezia.  GENITOURINARY: No dysuria, frequency, hematuria, or incontinence  NEUROLOGICAL: No headaches, memory loss, loss of strength, numbness, or tremors  SKIN: No itching, burning, rashes, or lesions   LYMPH Nodes: No enlarged glands  ENDOCRINE: No heat or cold intolerance; No hair loss  MUSCULOSKELETAL: No joint pain or swelling; No muscle, back, or extremity pain  PSYCHIATRIC: No depression, anxiety, mood swings, or difficulty sleeping  HEME/LYMPH: No easy bruising, or bleeding gums  ALLERGY AND IMMUNOLOGIC: No hives or eczema	        Vital Signs Last 24 Hrs  T(C): 36.4 (29 May 2018 11:22), Max: 36.6 (29 May 2018 04:23)  T(F): 97.6 (29 May 2018 11:22), Max: 97.8 (29 May 2018 04:23)  HR: 78 (29 May 2018 11:22) (66 - 92)  BP: 104/72 (29 May 2018 11:22) (104/72 - 175/74)  BP(mean): --  RR: 20 (29 May 2018 11:22) (17 - 20)  SpO2: 96% (29 May 2018 11:22) (96% - 100%)      PHYSICAL EXAM:   General: Awake and alert, in slight distress due to pain.  Head: Normocephalic, no mass and lesions.  Eyes: PERRLA, clear conjunctiva. EOMI, no ptosis.   Neck: No lymphadenopathy, no masses, no thyromegaly. Carotid pulses 2+. No JVD.   Respiratory: Bilateral lung clear to auscultation, no crackles, no wheezes, no rhonchi.   Cardiovascular: S1/S2 auscultated, no murmur, or gallop. Rhythm is irregular. No peripheral edema. +2 dorsal pulses b/l.   Abdomen: Soft, non-tender, nondistended, no guarding or rebound tenderness. Active bowel sounds in all 4 quadrants.   Extremities: No significant deformity or joint abnormality   Skin: no ulcers on feet b/l   Neurological: Awake and alert but disoriented.       LABS:                        9.4    13.6  )-----------( 196      ( 29 May 2018 10:29 )             29.7     05-29    142  |  116<H>  |  16  ----------------------------<  206<H>  3.9   |  14<L>  |  0.80    Ca    7.8<L>      29 May 2018 10:25  Phos  2.1     05-29  Mg     1.9     05-29          PT/INR - ( 29 May 2018 10:28 )   PT: 12.4 sec;   INR: 1.14 ratio         PTT - ( 29 May 2018 10:28 )  PTT:23.5 sec    CAPILLARY BLOOD GLUCOSE      POCT Blood Glucose.: 112 mg/dL (29 May 2018 11:18)  POCT Blood Glucose.: 114 mg/dL (29 May 2018 09:27)  POCT Blood Glucose.: 97 mg/dL (29 May 2018 09:02)  POCT Blood Glucose.: 83 mg/dL (29 May 2018 08:34)  POCT Blood Glucose.: 41 mg/dL (29 May 2018 08:09)  POCT Blood Glucose.: 60 mg/dL (29 May 2018 07:52)  POCT Blood Glucose.: 43 mg/dL (29 May 2018 07:28)  POCT Blood Glucose.: 31 mg/dL (29 May 2018 07:27)  POCT Blood Glucose.: 291 mg/dL (28 May 2018 21:07)  POCT Blood Glucose.: 144 mg/dL (28 May 2018 20:09)  POCT Blood Glucose.: 87 mg/dL (28 May 2018 19:31)  POCT Blood Glucose.: 63 mg/dL (28 May 2018 18:55)  POCT Blood Glucose.: 54 mg/dL (28 May 2018 18:30)  POCT Blood Glucose.: 75 mg/dL (28 May 2018 16:29) HPI:  73 years old female with PMH of Afib (not on AC 2/2 GIB), 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 Found to have acute on chronic anemia suspected to be secondary to acute blood loss from underlying malignancy s/p 2u PRBC. Additionally patient found to have elevated TSH so Endocrine team consulted.     Patient seen and examined at bedside. Looks in slight distress due to pain. Daughter at bedside stated that patient is very sick and has not been eating well. Per daughter patient was recently found to have hypothyroidism, but they did not pay much attention as she has other medical problems which are more concerning to the family.     PAST MEDICAL & SURGICAL HISTORY:  Stroke  Atrial fibrillation  Sarcoma  Gastrointestinal bleed  Hypothyroid  History of nephrectomy  History of partial pancreatectomy         MEDICATIONS  (STANDING):  atorvastatin 40 milliGRAM(s) Oral at bedtime  calcium carbonate 500 mG (Tums) Chewable 1 Tablet(s) Chew once  digoxin     Tablet 0.125 milliGRAM(s) Oral daily  levothyroxine 75 MICROGram(s) Oral daily  melatonin 3 milliGRAM(s) Oral at bedtime  metoprolol tartrate 25 milliGRAM(s) Oral two times a day  mirtazapine 7.5 milliGRAM(s) Oral daily  pantoprazole    Tablet 40 milliGRAM(s) Oral daily    MEDICATIONS  (PRN):  acetaminophen   Tablet. 650 milliGRAM(s) Oral every 6 hours PRN Mild Pain (1 - 3)  ondansetron Injectable 4 milliGRAM(s) IV Push every 8 hours PRN Nausea and/or Vomiting      FAMILY HISTORY:  No pertinent family history in first degree relatives      SOCIAL HISTORY:  Denies EtOH, smoking. Lives with son. Needs assistance with ADLs, including walking.    REVIEW OF SYSTEMS:  CONSTITUTIONAL: No fever, weight loss, or fatigue  EYES: No eye pain, visual disturbances, or discharge  ENT:  No difficulty hearing, tinnitus, vertigo; No sinus or throat pain  NECK: No pain or stiffness  RESPIRATORY: No cough, wheezing, chills or hemoptysis; No Shortness of Breath  CARDIOVASCULAR: No chest pain, palpitations, passing out, dizziness, or leg swelling  GASTROINTESTINAL: No abdominal or epigastric pain. No nausea, vomiting, or hematemesis; No diarrhea or constipation. No melena or hematochezia.  GENITOURINARY: No dysuria, frequency, hematuria, or incontinence  NEUROLOGICAL: No headaches, memory loss, loss of strength, numbness, or tremors  SKIN: No itching, burning, rashes, or lesions   LYMPH Nodes: No enlarged glands  ENDOCRINE: No heat or cold intolerance; No hair loss  MUSCULOSKELETAL: No joint pain or swelling; No muscle, back, or extremity pain  PSYCHIATRIC: No depression, anxiety, mood swings, or difficulty sleeping  HEME/LYMPH: No easy bruising, or bleeding gums  ALLERGY AND IMMUNOLOGIC: No hives or eczema	        Vital Signs Last 24 Hrs  T(C): 36.4 (29 May 2018 11:22), Max: 36.6 (29 May 2018 04:23)  T(F): 97.6 (29 May 2018 11:22), Max: 97.8 (29 May 2018 04:23)  HR: 78 (29 May 2018 11:22) (66 - 92)  BP: 104/72 (29 May 2018 11:22) (104/72 - 175/74)  BP(mean): --  RR: 20 (29 May 2018 11:22) (17 - 20)  SpO2: 96% (29 May 2018 11:22) (96% - 100%)      PHYSICAL EXAM:   General: Awake and alert, in slight distress due to pain.  Head: Normocephalic, no mass and lesions.  Eyes: PERRLA, clear conjunctiva. EOMI, no ptosis.   Neck: No lymphadenopathy, no masses, no thyromegaly. Carotid pulses 2+. No JVD.   Respiratory: Bilateral lung clear to auscultation, no crackles, no wheezes, no rhonchi.   Cardiovascular: S1/S2 auscultated, no murmur, or gallop. Rhythm is irregular. No peripheral edema. +2 dorsal pulses b/l.   Abdomen: Soft, non-tender, nondistended, no guarding or rebound tenderness. Active bowel sounds in all 4 quadrants.   Extremities: No significant deformity or joint abnormality   Skin: no ulcers on feet b/l   Neurological: Awake and alert but disoriented.       LABS:                        9.4    13.6  )-----------( 196      ( 29 May 2018 10:29 )             29.7     05-29    142  |  116<H>  |  16  ----------------------------<  206<H>  3.9   |  14<L>  |  0.80    Ca    7.8<L>      29 May 2018 10:25  Phos  2.1     05-29  Mg     1.9     05-29          PT/INR - ( 29 May 2018 10:28 )   PT: 12.4 sec;   INR: 1.14 ratio         PTT - ( 29 May 2018 10:28 )  PTT:23.5 sec    CAPILLARY BLOOD GLUCOSE      POCT Blood Glucose.: 112 mg/dL (29 May 2018 11:18)  POCT Blood Glucose.: 114 mg/dL (29 May 2018 09:27)  POCT Blood Glucose.: 97 mg/dL (29 May 2018 09:02)  POCT Blood Glucose.: 83 mg/dL (29 May 2018 08:34)  POCT Blood Glucose.: 41 mg/dL (29 May 2018 08:09)  POCT Blood Glucose.: 60 mg/dL (29 May 2018 07:52)  POCT Blood Glucose.: 43 mg/dL (29 May 2018 07:28)  POCT Blood Glucose.: 31 mg/dL (29 May 2018 07:27)  POCT Blood Glucose.: 291 mg/dL (28 May 2018 21:07)  POCT Blood Glucose.: 144 mg/dL (28 May 2018 20:09)  POCT Blood Glucose.: 87 mg/dL (28 May 2018 19:31)  POCT Blood Glucose.: 63 mg/dL (28 May 2018 18:55)  POCT Blood Glucose.: 54 mg/dL (28 May 2018 18:30)  POCT Blood Glucose.: 75 mg/dL (28 May 2018 16:29) HPI:  73 years old female with PMH of Afib (not on AC 2/2 GIB), 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 Found to have acute on chronic anemia suspected to be secondary to acute blood loss from underlying malignancy s/p 2u PRBC. Additionally patient found to have elevated TSH so Endocrine team consulted.     Patient seen and examined at bedside. Looks in slight distress due to pain. Daughter at bedside stated that patient is very sick and has not been eating well. Per daughter patient was recently found to have hypothyroidism, but they did not pay much attention as she has other medical problems which are more concerning to the family.     PAST MEDICAL & SURGICAL HISTORY:  Stroke  Atrial fibrillation  Sarcoma  Gastrointestinal bleed  Hypothyroid  History of nephrectomy  History of partial pancreatectomy         MEDICATIONS  (STANDING):  atorvastatin 40 milliGRAM(s) Oral at bedtime  calcium carbonate 500 mG (Tums) Chewable 1 Tablet(s) Chew once  digoxin     Tablet 0.125 milliGRAM(s) Oral daily  levothyroxine 75 MICROGram(s) Oral daily  melatonin 3 milliGRAM(s) Oral at bedtime  metoprolol tartrate 25 milliGRAM(s) Oral two times a day  mirtazapine 7.5 milliGRAM(s) Oral daily  pantoprazole    Tablet 40 milliGRAM(s) Oral daily    MEDICATIONS  (PRN):  acetaminophen   Tablet. 650 milliGRAM(s) Oral every 6 hours PRN Mild Pain (1 - 3)  ondansetron Injectable 4 milliGRAM(s) IV Push every 8 hours PRN Nausea and/or Vomiting      FAMILY HISTORY:  No pertinent family history in first degree relatives      SOCIAL HISTORY:  Per HPI no EtOH nor smoking. Lives with son. Needs assistance with ADLs, including walking.    REVIEW OF SYSTEMS:  UNABLE TO ATTAIN	        Vital Signs Last 24 Hrs  T(C): 36.4 (29 May 2018 11:22), Max: 36.6 (29 May 2018 04:23)  T(F): 97.6 (29 May 2018 11:22), Max: 97.8 (29 May 2018 04:23)  HR: 78 (29 May 2018 11:22) (66 - 92)  BP: 104/72 (29 May 2018 11:22) (104/72 - 175/74)  BP(mean): --  RR: 20 (29 May 2018 11:22) (17 - 20)  SpO2: 96% (29 May 2018 11:22) (96% - 100%)      PHYSICAL EXAM:   General: Awake and alert, in slight distress due to pain.  Head: Normocephalic, no mass and lesions.  Eyes: PERRLA, clear conjunctiva. EOMI, no ptosis. +periorbital edema.  Neck: Unable to examine as patient was uncooperative  Respiratory: Bilateral lung clear to auscultation, no crackles, no wheezes, no rhonchi.   Cardiovascular: S1/S2 auscultated, no murmur, or gallop. Rhythm is irregular. No peripheral edema. +2 dorsal pulses b/l.   Abdomen: Soft, non-tender, nondistended, no guarding or rebound tenderness. Active bowel sounds in all 4 quadrants.   Extremities: No significant deformity or joint abnormality   Skin: no ulcers on feet b/l   Neurological: Awake and alert but disoriented.       LABS:                        9.4    13.6  )-----------( 196      ( 29 May 2018 10:29 )             29.7     05-29    142  |  116<H>  |  16  ----------------------------<  206<H>  3.9   |  14<L>  |  0.80    Ca    7.8<L>      29 May 2018 10:25  Phos  2.1     05-29  Mg     1.9     05-29          PT/INR - ( 29 May 2018 10:28 )   PT: 12.4 sec;   INR: 1.14 ratio         PTT - ( 29 May 2018 10:28 )  PTT:23.5 sec    CAPILLARY BLOOD GLUCOSE      POCT Blood Glucose.: 112 mg/dL (29 May 2018 11:18)  POCT Blood Glucose.: 114 mg/dL (29 May 2018 09:27)  POCT Blood Glucose.: 97 mg/dL (29 May 2018 09:02)  POCT Blood Glucose.: 83 mg/dL (29 May 2018 08:34)  POCT Blood Glucose.: 41 mg/dL (29 May 2018 08:09)  POCT Blood Glucose.: 60 mg/dL (29 May 2018 07:52)  POCT Blood Glucose.: 43 mg/dL (29 May 2018 07:28)  POCT Blood Glucose.: 31 mg/dL (29 May 2018 07:27)  POCT Blood Glucose.: 291 mg/dL (28 May 2018 21:07)  POCT Blood Glucose.: 144 mg/dL (28 May 2018 20:09)  POCT Blood Glucose.: 87 mg/dL (28 May 2018 19:31)  POCT Blood Glucose.: 63 mg/dL (28 May 2018 18:55)  POCT Blood Glucose.: 54 mg/dL (28 May 2018 18:30)  POCT Blood Glucose.: 75 mg/dL (28 May 2018 16:29)

## 2018-05-29 NOTE — CONSULT NOTE ADULT - PROBLEM SELECTOR RECOMMENDATION 4
-Currently rate controlled , care per primary team -Currently rate controlled with metoprolol 25 bid and Digoxin 0.25 daily.

## 2018-05-29 NOTE — CONSULT NOTE ADULT - PROBLEM SELECTOR RECOMMENDATION 2
-Secondary to poor oral intake due to underlying malignancy.   -Encourage proper nutrition and hydration -Secondary to poor oral intake due to underlying malignancy.   -HbA1c last year Apr 2017 was 5.9  -Encourage proper nutrition and hydration

## 2018-05-30 PROCEDURE — 99233 SBSQ HOSP IP/OBS HIGH 50: CPT | Mod: GC

## 2018-05-30 PROCEDURE — 99232 SBSQ HOSP IP/OBS MODERATE 35: CPT | Mod: GC

## 2018-05-30 RX ORDER — LEVOTHYROXINE SODIUM 125 MCG
64 TABLET ORAL
Qty: 0 | Refills: 0 | Status: DISCONTINUED | OUTPATIENT
Start: 2018-05-31 | End: 2018-06-09

## 2018-05-30 RX ORDER — FUROSEMIDE 40 MG
20 TABLET ORAL ONCE
Qty: 0 | Refills: 0 | Status: COMPLETED | OUTPATIENT
Start: 2018-05-30 | End: 2018-05-30

## 2018-05-30 RX ADMIN — ATORVASTATIN CALCIUM 40 MILLIGRAM(S): 80 TABLET, FILM COATED ORAL at 22:23

## 2018-05-30 RX ADMIN — PANTOPRAZOLE SODIUM 40 MILLIGRAM(S): 20 TABLET, DELAYED RELEASE ORAL at 15:27

## 2018-05-30 RX ADMIN — Medication 25 MILLIGRAM(S): at 18:56

## 2018-05-30 RX ADMIN — Medication 20 MILLIGRAM(S): at 15:26

## 2018-05-30 RX ADMIN — Medication 75 MICROGRAM(S): at 05:57

## 2018-05-30 RX ADMIN — Medication 25 MILLIGRAM(S): at 08:57

## 2018-05-30 RX ADMIN — MIRTAZAPINE 7.5 MILLIGRAM(S): 45 TABLET, ORALLY DISINTEGRATING ORAL at 22:23

## 2018-05-30 RX ADMIN — Medication 650 MILLIGRAM(S): at 18:35

## 2018-05-30 RX ADMIN — Medication 0.12 MILLIGRAM(S): at 05:57

## 2018-05-30 RX ADMIN — Medication 3 MILLIGRAM(S): at 22:23

## 2018-05-30 NOTE — PROGRESS NOTE ADULT - PROBLEM SELECTOR PLAN 1
-continue Synthroid 75 mcg daily  -Repeat TSH in 2-4 weeks -Patient currently on Synthroid 75 mcg oral daily, repeat free T4 after 1 week still low 0.3, would recommend to increase dose of synthroid to 88 mcg oral daily upon discharge and for now would give IV conversion which would be 64 mcg IV daily.   -Repeat TSH in 2-4 weeks -Patient currently on Synthroid 75 mcg oral daily, repeat free T4 after 1 week still low 0.3, would recommend to increase dose of synthroid to 88 mcg oral daily upon discharge and for now would give IV conversion which would be 64 mcg IV daily.   -Repeat TSH in 4 weeks

## 2018-05-30 NOTE — PROGRESS NOTE ADULT - PROBLEM SELECTOR PLAN 2
-Secondary to malnutrition  -Continue D5W+NS at 30 cc /hr -Secondary to malnutrition, continue with Ensure supplements, provide assisted feeds  -Continue D5W+NS at 30 cc /hr

## 2018-05-30 NOTE — PROGRESS NOTE ADULT - PROBLEM SELECTOR PLAN 1
Bleeding resolved. Likely GIB in the setting of duodenal mass which has bled before. Likely contributing to hypotension.  -Pantoprazole PO daily  -appreciate GI recs, no plan for scope unless has bloody BM. Bowel movements have been normal  -Hgb remains stabilized above baseline Hg s/p 2u pRBC  -Monitor CBC every other day, maintain active T&S

## 2018-05-30 NOTE — PROGRESS NOTE ADULT - PROBLEM SELECTOR PLAN 7
Home regimen synthroid 75 mcg daily per pharmacy. Per daughter, pt reported she wasn't taking her medications the 2 weeks prior to discharge because she felt too sick.  -TSH significantly elevated 31.9. Tree T4 and T3 low  -Per endocrine, cont Synthroid 75 mcg daily as pt was not being treated. Will need repeat TSH in 2 weeks

## 2018-05-30 NOTE — PROGRESS NOTE ADULT - ASSESSMENT
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

## 2018-05-30 NOTE — PROGRESS NOTE ADULT - PROBLEM SELECTOR PLAN 8
Home regimen lopressor 50mg BID  -continue lopressor 25 BID, monitor for hypotension  -continue dig 0.125mg daily, dig level wnl 5/29  -monitor on telemetry, currently in AF rate controlled  -no AC 2/2 bleeding risk. Monitor off tele

## 2018-05-30 NOTE — PROGRESS NOTE ADULT - SUBJECTIVE AND OBJECTIVE BOX
Internal Medicine Progress Note    Claire De Paz, PGY1  Internal Medicine, team 1  Pager [phone] / [phone]  After 7PM on weekdays and 12PM on weekends, please page #[phone]    Patient is a 73y old  Female who presents with a chief complaint of Lethargy, abd pain (23 May 2018 16:13)      SUBJECTIVE / OVERNIGHT EVENTS: Discontinued finger sticks and started D5 @ 30cc/hr. Per nurse, pt had 2 normal BM overnight. Pt denies pain. Aphasic this morning    MEDICATIONS  (STANDING):  atorvastatin 40 milliGRAM(s) Oral at bedtime  calcium carbonate 500 mG (Tums) Chewable 1 Tablet(s) Chew once  dextrose 5% + sodium chloride 0.9%. 1000 milliLiter(s) (30 mL/Hr) IV Continuous <Continuous>  digoxin     Tablet 0.125 milliGRAM(s) Oral daily  levothyroxine 75 MICROGram(s) Oral daily  melatonin 3 milliGRAM(s) Oral at bedtime  metoprolol tartrate 25 milliGRAM(s) Oral two times a day  mirtazapine 7.5 milliGRAM(s) Oral daily  pantoprazole    Tablet 40 milliGRAM(s) Oral daily    MEDICATIONS  (PRN):  acetaminophen   Tablet. 650 milliGRAM(s) Oral every 6 hours PRN Mild Pain (1 - 3)  ondansetron Injectable 4 milliGRAM(s) IV Push every 8 hours PRN Nausea and/or Vomiting      Vital Signs Last 24 Hrs  T(C): 36.5 (30 May 2018 11:59), Max: 36.5 (30 May 2018 11:59)  T(F): 97.7 (30 May 2018 11:59), Max: 97.7 (30 May 2018 11:59)  HR: 74 (30 May 2018 11:59) (74 - 88)  BP: 104/68 (30 May 2018 11:59) (95/65 - 112/71)  BP(mean): --  RR: 18 (30 May 2018 11:59) (17 - 18)  SpO2: 95% (30 May 2018 11:59) (95% - 97%)    CAPILLARY BLOOD GLUCOSE      POCT Blood Glucose.: 81 mg/dL (29 May 2018 16:02)  POCT Blood Glucose.: 69 mg/dL (29 May 2018 16:00)      I&O's Summary    29 May 2018 07:01  -  30 May 2018 07:00  --------------------------------------------------------  IN: 1240 mL / OUT: 0 mL / NET: 1240 mL    30 May 2018 07:01  -  30 May 2018 13:03  --------------------------------------------------------  IN: 240 mL / OUT: 0 mL / NET: 240 mL      PHYSICAL EXAM  GENERAL: NAD, alert  HEAD:  Atraumatic, temporal wasting  EYES: EOMI, PERRLA, conjunctiva and sclera clear  NECK: Supple, No JVD  CHEST/LUNG: Clear to auscultation bilaterally, decreased breath sounds at bases; No wheeze  HEART: Irregular rate and rhythm; No murmurs, rubs, or gallops  ABDOMEN: Soft, Nontender, Nondistended; Bowel sounds present  EXTREMITIES:  2+ Peripheral Pulses, No clubbing, cyanosis. Pretibial pitting edema  NEURO/PSYCH: AAOx1 (self), aphasia, nonfocal  SKIN: No rashes or lesions. Pretibial and sacral edema      LABS:                        9.4    13.6  )-----------( 196      ( 29 May 2018 10:29 )             29.7     CBC Full  -  ( 29 May 2018 10:29 )  WBC Count : 13.6 K/uL  Hemoglobin : 9.4 g/dL  Hematocrit : 29.7 %  Platelet Count - Automated : 196 K/uL  Mean Cell Volume : 96.7 fl  Mean Cell Hemoglobin : 30.8 pg  Mean Cell Hemoglobin Concentration : 31.8 gm/dL  Auto Neutrophil # : x  Auto Lymphocyte # : x  Auto Monocyte # : x  Auto Eosinophil # : x  Auto Basophil # : x  Auto Neutrophil % : x  Auto Lymphocyte % : x  Auto Monocyte % : x  Auto Eosinophil % : x  Auto Basophil % : x    05-29    142  |  116<H>  |  16  ----------------------------<  206<H>  3.9   |  14<L>  |  0.80    Ca    7.8<L>      29 May 2018 10:25  Phos  2.1     05-29  Mg     1.9     05-29      Creatinine Trend: 0.80<--, 0.92<--, 0.89<--, 0.84<--, 0.82<--, 0.73<--    PT/INR - ( 29 May 2018 10:28 )   PT: 12.4 sec;   INR: 1.14 ratio         PTT - ( 29 May 2018 10:28 )  PTT:23.5 sec      MICROBIOLOGY:    RADIOLOGY & ADDITIONAL TESTS:     CONSULTS: endocrinology Internal Medicine Progress Note    Claire De Paz, PGY1  Internal Medicine, team 1  Pager [phone] / [phone]  After 7PM on weekdays and 12PM on weekends, please page #[phone]    Patient is a 73y old  Female who presents with a chief complaint of Lethargy, abd pain (23 May 2018 16:13)      SUBJECTIVE / OVERNIGHT EVENTS: Discontinued finger sticks and started D5 @ 30cc/hr. Per nurse, pt had 2 normal BM overnight. Pt denies pain. Aphasic this morning    MEDICATIONS  (STANDING):  atorvastatin 40 milliGRAM(s) Oral at bedtime  calcium carbonate 500 mG (Tums) Chewable 1 Tablet(s) Chew once  dextrose 5% + sodium chloride 0.9%. 1000 milliLiter(s) (30 mL/Hr) IV Continuous <Continuous>  digoxin     Tablet 0.125 milliGRAM(s) Oral daily  levothyroxine 75 MICROGram(s) Oral daily  melatonin 3 milliGRAM(s) Oral at bedtime  metoprolol tartrate 25 milliGRAM(s) Oral two times a day  mirtazapine 7.5 milliGRAM(s) Oral daily  pantoprazole    Tablet 40 milliGRAM(s) Oral daily    MEDICATIONS  (PRN):  acetaminophen   Tablet. 650 milliGRAM(s) Oral every 6 hours PRN Mild Pain (1 - 3)  ondansetron Injectable 4 milliGRAM(s) IV Push every 8 hours PRN Nausea and/or Vomiting      Vital Signs Last 24 Hrs  T(C): 36.5 (30 May 2018 11:59), Max: 36.5 (30 May 2018 11:59)  T(F): 97.7 (30 May 2018 11:59), Max: 97.7 (30 May 2018 11:59)  HR: 74 (30 May 2018 11:59) (74 - 88)  BP: 104/68 (30 May 2018 11:59) (95/65 - 112/71)  BP(mean): --  RR: 18 (30 May 2018 11:59) (17 - 18)  SpO2: 95% (30 May 2018 11:59) (95% - 97%)    CAPILLARY BLOOD GLUCOSE      POCT Blood Glucose.: 81 mg/dL (29 May 2018 16:02)  POCT Blood Glucose.: 69 mg/dL (29 May 2018 16:00)      I&O's Summary    29 May 2018 07:01  -  30 May 2018 07:00  --------------------------------------------------------  IN: 1240 mL / OUT: 0 mL / NET: 1240 mL    30 May 2018 07:01  -  30 May 2018 13:03  --------------------------------------------------------  IN: 240 mL / OUT: 0 mL / NET: 240 mL      PHYSICAL EXAM  GENERAL: NAD, alert, appears chronically ill   HEAD:  Atraumatic, temporal wasting  EYES: EOMI, PERRLA, conjunctiva and sclera clear  NECK: Supple, No JVD  CHEST/LUNG: + rales, normal resp effort and rate.   HEART: Irregular rate and rhythm; No murmurs, rubs, or gallops  ABDOMEN: Soft, Nontender, Nondistended; Bowel sounds present  EXTREMITIES:  2+ Peripheral Pulses, No clubbing, cyanosis. 3+ pitting edema  NEURO/PSYCH: AAOx1 (self), aphasia, nonfocal  SKIN: No rashes or lesions. Pretibial and sacral edema      LABS:                        9.4    13.6  )-----------( 196      ( 29 May 2018 10:29 )             29.7     CBC Full  -  ( 29 May 2018 10:29 )  WBC Count : 13.6 K/uL  Hemoglobin : 9.4 g/dL  Hematocrit : 29.7 %  Platelet Count - Automated : 196 K/uL  Mean Cell Volume : 96.7 fl  Mean Cell Hemoglobin : 30.8 pg  Mean Cell Hemoglobin Concentration : 31.8 gm/dL  Auto Neutrophil # : x  Auto Lymphocyte # : x  Auto Monocyte # : x  Auto Eosinophil # : x  Auto Basophil # : x  Auto Neutrophil % : x  Auto Lymphocyte % : x  Auto Monocyte % : x  Auto Eosinophil % : x  Auto Basophil % : x    05-29    142  |  116<H>  |  16  ----------------------------<  206<H>  3.9   |  14<L>  |  0.80    Ca    7.8<L>      29 May 2018 10:25  Phos  2.1     05-29  Mg     1.9     05-29      Creatinine Trend: 0.80<--, 0.92<--, 0.89<--, 0.84<--, 0.82<--, 0.73<--    PT/INR - ( 29 May 2018 10:28 )   PT: 12.4 sec;   INR: 1.14 ratio         PTT - ( 29 May 2018 10:28 )  PTT:23.5 sec      MICROBIOLOGY:    RADIOLOGY & ADDITIONAL TESTS:     CONSULTS: endocrinology

## 2018-05-30 NOTE — PROGRESS NOTE ADULT - PROBLEM SELECTOR PLAN 9
DVT: No pharmacologic 2/2 GIB, SCDs  Diet: mechanical soft  Dispo: Evaluated for rehab, comfort measures

## 2018-05-30 NOTE — PROGRESS NOTE ADULT - PROBLEM SELECTOR PLAN 3
-Leukocytosis 28 on admission, improving. Tachycardia on admission 2/2 afib with RVR. Unclear whether presence of SIRs is reactive 2/2 malignancy and blood loss vs infectious. Compressive atelectasis on CT, but cannot r/o pneumonia. Per son who is physician, pleural effusion is chronic and has been tapped in past. No other concerning signs for pneumonia at this time. Other possible source of infection is intraabdominal.  -s/p zosyn 5-d course  -BCx no growth to date. C diff negative. Pt is incontinent and edema makes straight cath difficult, but denies dysuria  -Procalcitonin 0.46 indeterminate  -Patient and family would like to pursue comfort measures (antibiotics were discontinued on 5/26)  -low suspicion for active infection at this time -Leukocytosis 28 on admission, improved. Tachycardia on admission 2/2 afib with RVR. Unclear whether presence of SIRs is reactive 2/2 malignancy and blood loss vs infectious. Compressive atelectasis on CT, but cannot r/o pneumonia. Per son who is physician, pleural effusion is chronic and has been tapped in past. No other concerning signs for pneumonia at this time. Other possible source of infection is intraabdominal.  -s/p zosyn 5-d course to broadly cover infection, but no source was found.  -BCx no growth to date. C diff negative. Pt is incontinent and edema makes straight cath difficult, but denies dysuria  -Procalcitonin 0.46 indeterminate  -Patient and family would like to pursue comfort measures (antibiotics were discontinued on 5/26)  -low suspicion for active infection at this time

## 2018-05-30 NOTE — PROGRESS NOTE ADULT - PROBLEM SELECTOR PLAN 4
-Multifactorial: May be secondary to sepsis, acute GIB, AF RVR, decreased PO intake. RRT for hypotension 5/25  -improving and stabilized today after receiving IVF  -cont to monitor closely  -Per family goals of care discussion, no central line or pressors are to be used for hypotension even if unresponsive to fluids  -AM cortisol elevated 22, no concern for adrenal insufficiency

## 2018-05-30 NOTE — PROGRESS NOTE ADULT - ATTENDING COMMENTS
mental status improved today. appreciate endo recs- hypoglycemia likely 2/2 to malnutrition and not hypothyroidism. cont ivf at 30cc/hr, however pt also with worsening volume overload-- will give lasix 20 ivp x 1 today. cont synthroid, AM cortisol reviewed and OK.  pt d/c'd off tele as she has been rate controlled. cont metoprolol and digoxin for afib.  No evidence of GI bleeding in days, on SCDs for dvt ppx, would consider adding hsq.  Out of bed to chair, change VS to q8h, d/c fingersticks  feeding is for comfort/pleasure, family would not be agreeable to feeding tubes etc  pt pending acceptance into [facility] mental status improved today. appreciate endo recs- hypoglycemia likely 2/2 to malnutrition and not hypothyroidism. cont ivf at 30cc/hr, however pt also with worsening volume overload-- will give lasix 20 ivp x 1 today. cont synthroid how will discuss IV administration with endo given T4 not improved, AM cortisol reviewed and OK.  pt d/c'd off tele as she has been rate controlled. cont metoprolol and digoxin for afib.  No evidence of GI bleeding in days, on SCDs for dvt ppx, would consider adding hsq.  Out of bed to chair, change VS to q8h, d/c fingersticks  feeding is for comfort/pleasure, family would not be agreeable to feeding tubes etc  pt pending acceptance into [facility]

## 2018-05-30 NOTE — PROGRESS NOTE ADULT - PROBLEM SELECTOR PLAN 5
-No current treatment this admission. Discussed care with p[place]ologist [place] [facility]. At this time no further treatment planned given poor performance status. ongoing C discussions with son regarding dispo. She has been living with children prior to admission.  -Per oncologist Dr Smith at [facility] no further systemic treatment will be offered. Possible RT but can be pursued as an outpatient.  -patient is comfort measures

## 2018-05-30 NOTE — PROGRESS NOTE ADULT - SUBJECTIVE AND OBJECTIVE BOX
Chief Complaint/Follow-up on: Hypothyroidism    HPI / Interval change : 73 F with Afib, metastatic sarcoma, CVA and hypothyroidism who presented with abdominal pain worsening over past weeks and now with acute on chronic blood loss anemia. Endocrine team was consulted for hypothyroidism as TSH was elevated t0 30s.  Patient seen and examined at bedside. No overnight events. Very lethargic and cachectic.  Not agitated. Complains of diffuse abdominal pain. Says feeling cold and hungry, asking for food.     MEDICATIONS  (STANDING):  atorvastatin 40 milliGRAM(s) Oral at bedtime  calcium carbonate 500 mG (Tums) Chewable 1 Tablet(s) Chew once  dextrose 5% + sodium chloride 0.9%. 1000 milliLiter(s) (30 mL/Hr) IV Continuous <Continuous>  digoxin     Tablet 0.125 milliGRAM(s) Oral daily  levothyroxine 75 MICROGram(s) Oral daily  melatonin 3 milliGRAM(s) Oral at bedtime  metoprolol tartrate 25 milliGRAM(s) Oral two times a day  mirtazapine 7.5 milliGRAM(s) Oral daily  pantoprazole    Tablet 40 milliGRAM(s) Oral daily    MEDICATIONS  (PRN):  acetaminophen   Tablet. 650 milliGRAM(s) Oral every 6 hours PRN Mild Pain (1 - 3)  ondansetron Injectable 4 milliGRAM(s) IV Push every 8 hours PRN Nausea and/or Vomiting      PHYSICAL EXAM:  VITALS: T(C): 36.4 (05-30-18 @ 04:33)  T(F): 97.6 (05-30-18 @ 04:33), Max: 97.6 (05-29-18 @ 11:22)  HR: 88 (05-30-18 @ 08:47) (76 - 88)  BP: 111/73 (05-30-18 @ 08:47) (95/65 - 112/71)  RR:  (17 - 20)  SpO2:  (96% - 97%)    PHYSICAL EXAM:   GENERAL: very cachectic.   EYES: No proptosis, no injection  HEENT:  Atraumatic, Normocephalic, moist mucous membranes  THYROID: could not appreciate as patient is not very cooperative with the exam  RESPIRATORY: Clear to auscultation bilaterally; No rales, rhonchi, wheezing, or rubs  CARDIOVASCULAR: Irregular rate and rhythm; No murmurs; + pitting peripheral edema   GI: Soft, sunken, + diffuse tenderness, non distended, normal bowel sounds  Skin: no ulcers in b/l feet.     POCT Blood Glucose.: 81 mg/dL (05-29-18 @ 16:02)  POCT Blood Glucose.: 69 mg/dL (05-29-18 @ 16:00)  POCT Blood Glucose.: 112 mg/dL (05-29-18 @ 11:18)  POCT Blood Glucose.: 114 mg/dL (05-29-18 @ 09:27)  POCT Blood Glucose.: 97 mg/dL (05-29-18 @ 09:02)  POCT Blood Glucose.: 83 mg/dL (05-29-18 @ 08:34)  POCT Blood Glucose.: 41 mg/dL (05-29-18 @ 08:09)  POCT Blood Glucose.: 60 mg/dL (05-29-18 @ 07:52)  POCT Blood Glucose.: 43 mg/dL (05-29-18 @ 07:28)  POCT Blood Glucose.: 31 mg/dL (05-29-18 @ 07:27)      05-29    142  |  116<H>  |  16  ----------------------------<  206<H>  3.9   |  14<L>  |  0.80    EGFR if : 84  EGFR if non : 73    Ca    7.8<L>      05-29  Mg     1.9     05-29  Phos  2.1     05-29            Thyroid Function Tests:  05-29 @ 16:05 TSH -- FreeT4 0.3 T3 -- Anti TPO -- Anti Thyroglobulin Ab -- TSI --  05-27 @ 12:16 TSH 32.96 FreeT4 -- T3 -- Anti TPO -- Anti Thyroglobulin Ab -- TSI --

## 2018-05-30 NOTE — PROGRESS NOTE ADULT - ASSESSMENT
73 F with Afib, metastatic sarcoma, CVA and hypothyroidism who presented with abdominal pain worsening over past weeks and now with acute on chronic blood loss anemia. Endocrine team was consulted for hypothyroidism as TSH was elevated t0 30s.

## 2018-05-31 LAB
ANION GAP SERPL CALC-SCNC: 10 MMOL/L — SIGNIFICANT CHANGE UP (ref 5–17)
BLD GP AB SCN SERPL QL: NEGATIVE — SIGNIFICANT CHANGE UP
BUN SERPL-MCNC: 19 MG/DL — SIGNIFICANT CHANGE UP (ref 7–23)
CALCIUM SERPL-MCNC: 7.6 MG/DL — LOW (ref 8.4–10.5)
CHLORIDE SERPL-SCNC: 117 MMOL/L — HIGH (ref 96–108)
CO2 SERPL-SCNC: 17 MMOL/L — LOW (ref 22–31)
CREAT SERPL-MCNC: 0.78 MG/DL — SIGNIFICANT CHANGE UP (ref 0.5–1.3)
GLUCOSE SERPL-MCNC: 91 MG/DL — SIGNIFICANT CHANGE UP (ref 70–99)
HCT VFR BLD CALC: 23.6 % — LOW (ref 34.5–45)
HGB BLD-MCNC: 7.4 G/DL — LOW (ref 11.5–15.5)
MAGNESIUM SERPL-MCNC: 1.9 MG/DL — SIGNIFICANT CHANGE UP (ref 1.6–2.6)
MCHC RBC-ENTMCNC: 30.6 PG — SIGNIFICANT CHANGE UP (ref 27–34)
MCHC RBC-ENTMCNC: 31.2 GM/DL — LOW (ref 32–36)
MCV RBC AUTO: 98.1 FL — SIGNIFICANT CHANGE UP (ref 80–100)
PHOSPHATE SERPL-MCNC: 2 MG/DL — LOW (ref 2.5–4.5)
PLATELET # BLD AUTO: 162 K/UL — SIGNIFICANT CHANGE UP (ref 150–400)
POTASSIUM SERPL-MCNC: 4.2 MMOL/L — SIGNIFICANT CHANGE UP (ref 3.5–5.3)
POTASSIUM SERPL-SCNC: 4.2 MMOL/L — SIGNIFICANT CHANGE UP (ref 3.5–5.3)
RBC # BLD: 2.41 M/UL — LOW (ref 3.8–5.2)
RBC # FLD: 23 % — HIGH (ref 10.3–14.5)
RH IG SCN BLD-IMP: POSITIVE — SIGNIFICANT CHANGE UP
SODIUM SERPL-SCNC: 144 MMOL/L — SIGNIFICANT CHANGE UP (ref 135–145)
WBC # BLD: 15.1 K/UL — HIGH (ref 3.8–10.5)
WBC # FLD AUTO: 15.1 K/UL — HIGH (ref 3.8–10.5)

## 2018-05-31 PROCEDURE — 99233 SBSQ HOSP IP/OBS HIGH 50: CPT | Mod: GC

## 2018-05-31 RX ORDER — SODIUM CHLORIDE 9 MG/ML
250 INJECTION INTRAMUSCULAR; INTRAVENOUS; SUBCUTANEOUS ONCE
Qty: 0 | Refills: 0 | Status: COMPLETED | OUTPATIENT
Start: 2018-05-31 | End: 2018-05-31

## 2018-05-31 RX ORDER — SODIUM,POTASSIUM PHOSPHATES 278-250MG
1 POWDER IN PACKET (EA) ORAL ONCE
Qty: 0 | Refills: 0 | Status: COMPLETED | OUTPATIENT
Start: 2018-05-31 | End: 2018-05-31

## 2018-05-31 RX ORDER — PANTOPRAZOLE SODIUM 20 MG/1
40 TABLET, DELAYED RELEASE ORAL
Qty: 0 | Refills: 0 | Status: DISCONTINUED | OUTPATIENT
Start: 2018-05-31 | End: 2018-06-09

## 2018-05-31 RX ADMIN — Medication 64 MICROGRAM(S): at 06:26

## 2018-05-31 RX ADMIN — Medication 1 PACKET(S): at 22:02

## 2018-05-31 RX ADMIN — PANTOPRAZOLE SODIUM 40 MILLIGRAM(S): 20 TABLET, DELAYED RELEASE ORAL at 18:05

## 2018-05-31 RX ADMIN — SODIUM CHLORIDE 100 MILLILITER(S): 9 INJECTION, SOLUTION INTRAVENOUS at 11:45

## 2018-05-31 RX ADMIN — SODIUM CHLORIDE 500 MILLILITER(S): 9 INJECTION INTRAMUSCULAR; INTRAVENOUS; SUBCUTANEOUS at 01:49

## 2018-05-31 RX ADMIN — Medication 650 MILLIGRAM(S): at 18:05

## 2018-05-31 NOTE — PROGRESS NOTE ADULT - PROBLEM SELECTOR PLAN 6
-2/2 underlying sarcoma, duodenal masses with fistula, possible intraabdominal infection  -s/p zosyn 5-day course  -Avoid opioids due to mental status Improved leukocytosis 28 on admission. Tachycardia on admission 2/2 afib with RVR. Unclear whether presence of SIRs is reactive 2/2 malignancy and blood loss vs infectious. Compressive atelectasis on CT, but cannot r/o pneumonia. Per son who is physician, pleural effusion is chronic and has been tapped in past. No other concerning signs for pneumonia at this time. Other possible source of infection is intraabdominal.  -s/p zosyn 5-d course to broadly cover infection, but no source was found.  -BCx no growth to date. C diff negative. Pt is incontinent and edema makes straight cath difficult, but denies dysuria  -Procalcitonin 0.46 indeterminate  -Patient and family would like to pursue comfort measures (antibiotics were discontinued on 5/26)  -low suspicion for active infection at this time

## 2018-05-31 NOTE — PROGRESS NOTE ADULT - SUBJECTIVE AND OBJECTIVE BOX
Internal Medicine Progress Note    Claire De Paz, PGY1  Internal Medicine, team 1  Pager [phone] / [phone]  After 7PM on weekdays and 12PM on weekends, please page #[phone]    Patient is a 73y old  Female who presents with a chief complaint of Lethargy, abd pain (23 May 2018 16:13)      SUBJECTIVE / OVERNIGHT EVENTS: No events overnight.    MEDICATIONS  (STANDING):  atorvastatin 40 milliGRAM(s) Oral at bedtime  calcium carbonate 500 mG (Tums) Chewable 1 Tablet(s) Chew once  dextrose 5% + sodium chloride 0.9%. 1000 milliLiter(s) (30 mL/Hr) IV Continuous <Continuous>  digoxin     Tablet 0.125 milliGRAM(s) Oral daily  levothyroxine Injectable 64 MICROGram(s) IV Push <User Schedule>  melatonin 3 milliGRAM(s) Oral at bedtime  metoprolol tartrate 25 milliGRAM(s) Oral two times a day  mirtazapine 7.5 milliGRAM(s) Oral daily  pantoprazole    Tablet 40 milliGRAM(s) Oral daily    MEDICATIONS  (PRN):  acetaminophen   Tablet. 650 milliGRAM(s) Oral every 6 hours PRN Mild Pain (1 - 3)  ondansetron Injectable 4 milliGRAM(s) IV Push every 8 hours PRN Nausea and/or Vomiting      Vital Signs Last 24 Hrs  T(C): 36.2 (31 May 2018 06:23), Max: 36.5 (30 May 2018 11:59)  T(F): 97.2 (31 May 2018 06:23), Max: 97.7 (30 May 2018 11:59)  HR: 76 (31 May 2018 06:23) (67 - 88)  BP: 93/65 (31 May 2018 06:23) (89/60 - 119/65)  BP(mean): --  RR: 18 (31 May 2018 04:27) (17 - 18)  SpO2: 94% (30 May 2018 20:40) (94% - 95%)    CAPILLARY BLOOD GLUCOSE          I&O's Summary    30 May 2018 07:01  -  31 May 2018 07:00  --------------------------------------------------------  IN: 1180 mL / OUT: 0 mL / NET: 1180 mL        PHYSICAL EXAM  GENERAL: NAD, alert, appears chronically ill   HEAD:  Atraumatic, temporal wasting  EYES: EOMI, PERRLA, conjunctiva and sclera clear  NECK: Supple, No JVD  CHEST/LUNG: + rales, normal resp effort and rate.   HEART: Irregular rate and rhythm; No murmurs, rubs, or gallops  ABDOMEN: Soft, Nontender, Nondistended; Bowel sounds present  EXTREMITIES:  2+ Peripheral Pulses, No clubbing, cyanosis. 3+ pitting edema  NEURO/PSYCH: AAOx1 (self), aphasia, nonfocal  SKIN: No rashes or lesions. Pretibial and sacral edema      LABS:                         9.4    13.6  )-----------( 196      ( 29 May 2018 10:29 )             29.7     CBC Full  -  ( 29 May 2018 10:29 )  WBC Count : 13.6 K/uL  Hemoglobin : 9.4 g/dL  Hematocrit : 29.7 %  Platelet Count - Automated : 196 K/uL  Mean Cell Volume : 96.7 fl  Mean Cell Hemoglobin : 30.8 pg  Mean Cell Hemoglobin Concentration : 31.8 gm/dL  Auto Neutrophil # : x  Auto Lymphocyte # : x  Auto Monocyte # : x  Auto Eosinophil # : x  Auto Basophil # : x  Auto Neutrophil % : x  Auto Lymphocyte % : x  Auto Monocyte % : x  Auto Eosinophil % : x  Auto Basophil % : x    05-29    142  |  116<H>  |  16  ----------------------------<  206<H>  3.9   |  14<L>  |  0.80    Ca    7.8<L>      29 May 2018 10:25  Phos  2.1     05-29  Mg     1.9     05-29      Creatinine Trend: 0.80<--, 0.92<--, 0.89<--, 0.84<--, 0.82<--, 0.73<--    PT/INR - ( 29 May 2018 10:28 )   PT: 12.4 sec;   INR: 1.14 ratio         PTT - ( 29 May 2018 10:28 )  PTT:23.5 sec        MICROBIOLOGY:    RADIOLOGY & ADDITIONAL TESTS:     CONSULTS: endocrinology Internal Medicine Progress Note    Claire De Paz, PGY1  Internal Medicine, team 1  Pager [phone] / [phone]  After 7PM on weekdays and 12PM on weekends, please page #[phone]    Patient is a 73y old  Female who presents with a chief complaint of Lethargy, abd pain (23 May 2018 16:13)      SUBJECTIVE / OVERNIGHT EVENTS: T 96.2 this AM, bear hugger placed. Pt had melanotic bowel movement today. Spoke with HCP, pt's son  Reymundo Figueredolucie, and we will obtain palliative care consult. Pt alert and talking in full sentences today. Received lasix 20mg IV x 1 for rales and LE pitting edema.    MEDICATIONS  (STANDING):  atorvastatin 40 milliGRAM(s) Oral at bedtime  calcium carbonate 500 mG (Tums) Chewable 1 Tablet(s) Chew once  dextrose 5% + sodium chloride 0.9%. 1000 milliLiter(s) (100 mL/Hr) IV Continuous <Continuous>  digoxin     Tablet 0.125 milliGRAM(s) Oral daily  levothyroxine Injectable 64 MICROGram(s) IV Push <User Schedule>  melatonin 3 milliGRAM(s) Oral at bedtime  metoprolol tartrate 25 milliGRAM(s) Oral two times a day  mirtazapine 7.5 milliGRAM(s) Oral daily  pantoprazole  Injectable 40 milliGRAM(s) IV Push two times a day  potassium phosphate / sodium phosphate powder 1 Packet(s) Oral once    MEDICATIONS  (PRN):  acetaminophen   Tablet. 650 milliGRAM(s) Oral every 6 hours PRN Mild Pain (1 - 3)  ondansetron Injectable 4 milliGRAM(s) IV Push every 8 hours PRN Nausea and/or Vomiting      Vital Signs Last 24 Hrs  T(C): 36.2 (31 May 2018 12:00), Max: 36.4 (30 May 2018 20:40)  T(F): 97.1 (31 May 2018 12:00), Max: 97.6 (30 May 2018 20:40)  HR: 77 (31 May 2018 12:00) (67 - 86)  BP: 96/66 (31 May 2018 12:00) (89/60 - 119/65)  BP(mean): --  RR: 20 (31 May 2018 12:00) (17 - 20)  SpO2: 97% (31 May 2018 12:00) (94% - 100%)    CAPILLARY BLOOD GLUCOSE        I&O's Summary    30 May 2018 07:01  -  31 May 2018 07:00  --------------------------------------------------------  IN: 1180 mL / OUT: 0 mL / NET: 1180 mL    31 May 2018 07:01  -  31 May 2018 15:08  --------------------------------------------------------  IN: 510 mL / OUT: 0 mL / NET: 510 mL      PHYSICAL EXAM  GENERAL: NAD, alert, appears chronically ill   HEAD:  Atraumatic, temporal wasting  EYES: EOMI, PERRLA, conjunctiva and sclera clear  NECK: Supple, No JVD  CHEST/LUNG: Improved rales, normal resp effort and rate.   HEART: Irregular rate and rhythm; No murmurs, rubs, or gallops  ABDOMEN: Soft, Nontender, Nondistended; Bowel sounds present  EXTREMITIES:  2+ Peripheral Pulses, No clubbing, cyanosis. 1+ pitting pretibial edema  NEURO/PSYCH: AAOx1 (self), aphasia, otherwise nonfocal  SKIN: No rashes or lesions. Pretibial and sacral edema      LABS:                          7.4    15.1  )-----------( 162      ( 31 May 2018 11:18 )             23.6     CBC Full  -  ( 31 May 2018 11:18 )  WBC Count : 15.1 K/uL  Hemoglobin : 7.4 g/dL  Hematocrit : 23.6 %  Platelet Count - Automated : 162 K/uL  Mean Cell Volume : 98.1 fl  Mean Cell Hemoglobin : 30.6 pg  Mean Cell Hemoglobin Concentration : 31.2 gm/dL  Auto Neutrophil # : x  Auto Lymphocyte # : x  Auto Monocyte # : x  Auto Eosinophil # : x  Auto Basophil # : x  Auto Neutrophil % : x  Auto Lymphocyte % : x  Auto Monocyte % : x  Auto Eosinophil % : x  Auto Basophil % : x    05-31    144  |  117<H>  |  19  ----------------------------<  91  4.2   |  17<L>  |  0.78    Ca    7.6<L>      31 May 2018 11:17  Phos  2.0     05-31  Mg     1.9     05-31    Creatinine Trend: 0.78<--, 0.80<--, 0.92<--, 0.89<--, 0.84<--, 0.82<--      MICROBIOLOGY:    RADIOLOGY & ADDITIONAL TESTS:     CONSULTS: endocrinology, palliative care Internal Medicine Progress Note    Claire De Paz, PGY1  Internal Medicine, team 1  Pager [phone] / [phone]  After 7PM on weekdays and 12PM on weekends, please page #[phone]    Patient is a 73y old  Female who presents with a chief complaint of Lethargy, abd pain (23 May 2018 16:13)      SUBJECTIVE / OVERNIGHT EVENTS: T 96.2 this AM, bear hugger placed. Pt had large liquid black bowel movement today. Spoke with HCP, pt's son  Reymundo Rivera, and we will obtain palliative care consult. Pt alert and talking in full sentences today. Received lasix 20mg IV x 1 for rales and LE pitting edema.    MEDICATIONS  (STANDING):  atorvastatin 40 milliGRAM(s) Oral at bedtime  calcium carbonate 500 mG (Tums) Chewable 1 Tablet(s) Chew once  dextrose 5% + sodium chloride 0.9%. 1000 milliLiter(s) (100 mL/Hr) IV Continuous <Continuous>  digoxin     Tablet 0.125 milliGRAM(s) Oral daily  levothyroxine Injectable 64 MICROGram(s) IV Push <User Schedule>  melatonin 3 milliGRAM(s) Oral at bedtime  metoprolol tartrate 25 milliGRAM(s) Oral two times a day  mirtazapine 7.5 milliGRAM(s) Oral daily  pantoprazole  Injectable 40 milliGRAM(s) IV Push two times a day  potassium phosphate / sodium phosphate powder 1 Packet(s) Oral once    MEDICATIONS  (PRN):  acetaminophen   Tablet. 650 milliGRAM(s) Oral every 6 hours PRN Mild Pain (1 - 3)  ondansetron Injectable 4 milliGRAM(s) IV Push every 8 hours PRN Nausea and/or Vomiting      Vital Signs Last 24 Hrs  T(C): 36.2 (31 May 2018 12:00), Max: 36.4 (30 May 2018 20:40)  T(F): 97.1 (31 May 2018 12:00), Max: 97.6 (30 May 2018 20:40)  HR: 77 (31 May 2018 12:00) (67 - 86)  BP: 96/66 (31 May 2018 12:00) (89/60 - 119/65)  BP(mean): --  RR: 20 (31 May 2018 12:00) (17 - 20)  SpO2: 97% (31 May 2018 12:00) (94% - 100%)    CAPILLARY BLOOD GLUCOSE        I&O's Summary    30 May 2018 07:01  -  31 May 2018 07:00  --------------------------------------------------------  IN: 1180 mL / OUT: 0 mL / NET: 1180 mL    31 May 2018 07:01  -  31 May 2018 15:08  --------------------------------------------------------  IN: 510 mL / OUT: 0 mL / NET: 510 mL      PHYSICAL EXAM  GENERAL: NAD, alert, appears chronically ill   HEAD:  Atraumatic, temporal wasting  EYES: EOMI, PERRLA, conjunctiva and sclera clear  NECK: Supple, No JVD  CHEST/LUNG: Improved rales, normal resp effort and rate.   HEART: Irregular rate and rhythm; No murmurs, rubs, or gallops  ABDOMEN: Soft, Nontender, Nondistended; Bowel sounds present  EXTREMITIES:  2+ Peripheral Pulses, No clubbing, cyanosis. 1+ pitting pretibial edema  NEURO/PSYCH: AAOx1 (self), aphasia, otherwise nonfocal  SKIN: No rashes or lesions. Pretibial and sacral edema      LABS:                          7.4    15.1  )-----------( 162      ( 31 May 2018 11:18 )             23.6     CBC Full  -  ( 31 May 2018 11:18 )  WBC Count : 15.1 K/uL  Hemoglobin : 7.4 g/dL  Hematocrit : 23.6 %  Platelet Count - Automated : 162 K/uL  Mean Cell Volume : 98.1 fl  Mean Cell Hemoglobin : 30.6 pg  Mean Cell Hemoglobin Concentration : 31.2 gm/dL  Auto Neutrophil # : x  Auto Lymphocyte # : x  Auto Monocyte # : x  Auto Eosinophil # : x  Auto Basophil # : x  Auto Neutrophil % : x  Auto Lymphocyte % : x  Auto Monocyte % : x  Auto Eosinophil % : x  Auto Basophil % : x    05-31    144  |  117<H>  |  19  ----------------------------<  91  4.2   |  17<L>  |  0.78    Ca    7.6<L>      31 May 2018 11:17  Phos  2.0     05-31  Mg     1.9     05-31    Creatinine Trend: 0.78<--, 0.80<--, 0.92<--, 0.89<--, 0.84<--, 0.82<--      MICROBIOLOGY:    RADIOLOGY & ADDITIONAL TESTS:     CONSULTS: endocrinology, palliative care

## 2018-05-31 NOTE — PROGRESS NOTE ADULT - PROBLEM SELECTOR PLAN 1
Bleeding resolved. Likely GIB in the setting of duodenal mass which has bled before. Likely contributing to hypotension.  -Pantoprazole PO daily  -appreciate GI recs, no plan for scope unless has bloody BM. Bowel movements have been normal  -Hgb remains stabilized above baseline Hg s/p 2u pRBC  -Monitor CBC every other day, maintain active T&S GIB in the setting of duodenal mass which has bled before. Likely contributing to hypotension.  -Patient is comfort care, no plan for scope  -Pantoprazole PO daily  -s/p 2u pRBC  -Monitor CBC every other day, maintain active T&S  -1unit today for 2 point drop in Hg to 7.4 with large black BM today GIB in the setting of duodenal mass which has bled before. Likely contributing to hypotension.  -Patient is comfort care, no plan for scope  -Pantoprazole IV BID  -s/p 2u pRBC  -Monitor CBC every other day, maintain active T&S  -1unit today for 2 point drop in Hg to 7.4 with large black BM today

## 2018-05-31 NOTE — PROGRESS NOTE ADULT - ASSESSMENT
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

## 2018-05-31 NOTE — PROGRESS NOTE ADULT - PROBLEM SELECTOR PLAN 7
Home regimen synthroid 75 mcg daily per pharmacy. Per daughter, pt reported she wasn't taking her medications the 2 weeks prior to discharge because she felt too sick.  -TSH significantly elevated 31.9. Tree T4 and T3 low  -Per endocrine, cont Synthroid 75 mcg daily as pt was not being treated. Will need repeat TSH in 2 weeks No current treatment this admission. Discussed care with pt's oncologist at [facility] [place]. Possible RT but can be pursued as an outpatient.At this time no further treatment planned given poor performance status. Ongoing GOC discussions with HCP regarding dispo. She has been living with children prior to admission.  -patient is comfort measures

## 2018-05-31 NOTE — PROGRESS NOTE ADULT - PROBLEM SELECTOR PLAN 10
GOC readdressed with pt's children. Pt's son  Reymundo Rivera is the HCP (documentation in chart). Comfort measures.   -Blood draws every other. IVF acceptable DVT: No pharmacologic 2/2 GIB, SCDs  Diet: mechanical soft  Dispo: Evaluated for rehab, comfort measures

## 2018-05-31 NOTE — PROVIDER CONTACT NOTE (OTHER) - REASON
pt refused all morning PO meds including Protonix, digoxin, and Metroprolol; -large watery black stool is noted

## 2018-05-31 NOTE — PROGRESS NOTE ADULT - PROBLEM SELECTOR PLAN 2
-Pt reporting right foot pain. Right toes were are erythematous and cool to touch. Right pedal pulse palpable and present on doppler.  -Pt refused NADIRA bilateral LE study. HCP aware  -consider vascular consult, but is likely poor candidate for intervention and high risk for GIB GOC readdressed with pt's children. Pt's son  Reymundo Rivera is the HCP (documentation in chart). Comfort measures. He is open to palliative care consult  -Blood draws every other day. IVF, blood transfusions acceptable.  -Palliative care consulted, halt transfer plans to rehab

## 2018-05-31 NOTE — PROGRESS NOTE ADULT - PROBLEM SELECTOR PLAN 5
-No current treatment this admission. Discussed care with pts oncologist at [facility] [place]. At this time no further treatment planned given poor performance status. ongoing C discussions with son regarding dispo. She has been living with children prior to admission.  -Per oncologist Dr Smith at [facility] [place] no further systemic treatment will be offered. Possible RT but can be pursued as an outpatient.  -patient is comfort measures Multifactorial sepsis, acute GIB, AF RVR now resolved, decreased PO intake. RRT for hypotension 5/25  -improving and stabilized today after receiving IVF  -cont to monitor closely  -Per family goals of care discussion, no central line or pressors are to be used for hypotension even if unresponsive to fluids  -AM cortisol elevated 22, no concern for adrenal insufficiency

## 2018-05-31 NOTE — PROGRESS NOTE ADULT - PROBLEM SELECTOR PLAN 8
Home regimen lopressor 50mg BID  -continue lopressor 25 BID, monitor for hypotension  -continue dig 0.125mg daily, dig level wnl 5/29  -monitor on telemetry, currently in AF rate controlled  -no AC 2/2 bleeding risk. Monitor off tele 2/2 underlying sarcoma, duodenal masses with fistula, possible intraabdominal infection. s/p zosyn 5-day course  -Avoid opioids due to mental status

## 2018-05-31 NOTE — PROGRESS NOTE ADULT - PROBLEM SELECTOR PLAN 3
-Leukocytosis 28 on admission, improved. Tachycardia on admission 2/2 afib with RVR. Unclear whether presence of SIRs is reactive 2/2 malignancy and blood loss vs infectious. Compressive atelectasis on CT, but cannot r/o pneumonia. Per son who is physician, pleural effusion is chronic and has been tapped in past. No other concerning signs for pneumonia at this time. Other possible source of infection is intraabdominal.  -s/p zosyn 5-d course to broadly cover infection, but no source was found.  -BCx no growth to date. C diff negative. Pt is incontinent and edema makes straight cath difficult, but denies dysuria  -Procalcitonin 0.46 indeterminate  -Patient and family would like to pursue comfort measures (antibiotics were discontinued on 5/26)  -low suspicion for active infection at this time Pt reporting right foot pain. Right toes were are erythematous and cool to touch. Right pedal pulse palpable and present on doppler.  -Pt refused NADIRA bilateral LE study. HCP aware. Pt is high risk for GIB if anticoagulated

## 2018-05-31 NOTE — PROGRESS NOTE ADULT - PROBLEM SELECTOR PLAN 4
-Multifactorial: May be secondary to sepsis, acute GIB, AF RVR, decreased PO intake. RRT for hypotension 5/25  -improving and stabilized today after receiving IVF  -cont to monitor closely  -Per family goals of care discussion, no central line or pressors are to be used for hypotension even if unresponsive to fluids  -AM cortisol elevated 22, no concern for adrenal insufficiency Home regimen synthroid 75 mcg daily per pharmacy. Per daughter, pt reported she wasn't taking her medications the 2 weeks prior to discharge because she felt too sick.  -TSH significantly elevated 31.9. Tree T4 and T3 low  -Per endocrine, switch to synthroid 64mcgIV daily then discharge on 88mcg PO. Will need repeat TSH in 4 weeks

## 2018-05-31 NOTE — PROGRESS NOTE ADULT - PROBLEM SELECTOR PLAN 9
DVT: No pharmacologic 2/2 GIB, SCDs  Diet: mechanical soft  Dispo: Evaluated for rehab, comfort measures Home regimen lopressor 50mg BID  -continue lopressor 25 BID, monitor for hypotension  -continue dig 0.125mg daily, dig level wnl 5/29  -no AC 2/2 bleeding risk. Monitor off tele

## 2018-06-01 DIAGNOSIS — Z51.5 ENCOUNTER FOR PALLIATIVE CARE: ICD-10-CM

## 2018-06-01 DIAGNOSIS — K92.2 GASTROINTESTINAL HEMORRHAGE, UNSPECIFIED: ICD-10-CM

## 2018-06-01 DIAGNOSIS — R52 PAIN, UNSPECIFIED: ICD-10-CM

## 2018-06-01 LAB
ALBUMIN SERPL ELPH-MCNC: 1.5 G/DL — LOW (ref 3.3–5)
ALP SERPL-CCNC: 125 U/L — HIGH (ref 40–120)
ALT FLD-CCNC: 11 U/L — SIGNIFICANT CHANGE UP (ref 10–45)
ANION GAP SERPL CALC-SCNC: 11 MMOL/L — SIGNIFICANT CHANGE UP (ref 5–17)
AST SERPL-CCNC: 22 U/L — SIGNIFICANT CHANGE UP (ref 10–40)
BILIRUB SERPL-MCNC: 0.3 MG/DL — SIGNIFICANT CHANGE UP (ref 0.2–1.2)
BUN SERPL-MCNC: 19 MG/DL — SIGNIFICANT CHANGE UP (ref 7–23)
C DIFF GDH STL QL: NEGATIVE — SIGNIFICANT CHANGE UP
C DIFF GDH STL QL: SIGNIFICANT CHANGE UP
CALCIUM SERPL-MCNC: 7.3 MG/DL — LOW (ref 8.4–10.5)
CHLORIDE SERPL-SCNC: 115 MMOL/L — HIGH (ref 96–108)
CO2 SERPL-SCNC: 13 MMOL/L — LOW (ref 22–31)
CREAT SERPL-MCNC: 0.7 MG/DL — SIGNIFICANT CHANGE UP (ref 0.5–1.3)
GLUCOSE SERPL-MCNC: 76 MG/DL — SIGNIFICANT CHANGE UP (ref 70–99)
HCT VFR BLD CALC: 32.8 % — LOW (ref 34.5–45)
HGB BLD-MCNC: 10.2 G/DL — LOW (ref 11.5–15.5)
MAGNESIUM SERPL-MCNC: 1.9 MG/DL — SIGNIFICANT CHANGE UP (ref 1.6–2.6)
MCHC RBC-ENTMCNC: 29.7 PG — SIGNIFICANT CHANGE UP (ref 27–34)
MCHC RBC-ENTMCNC: 31 GM/DL — LOW (ref 32–36)
MCV RBC AUTO: 95.7 FL — SIGNIFICANT CHANGE UP (ref 80–100)
OB PNL STL: POSITIVE
PHOSPHATE SERPL-MCNC: 2.3 MG/DL — LOW (ref 2.5–4.5)
PLATELET # BLD AUTO: 129 K/UL — LOW (ref 150–400)
POTASSIUM SERPL-MCNC: 5 MMOL/L — SIGNIFICANT CHANGE UP (ref 3.5–5.3)
POTASSIUM SERPL-SCNC: 5 MMOL/L — SIGNIFICANT CHANGE UP (ref 3.5–5.3)
PROT SERPL-MCNC: 5.4 G/DL — LOW (ref 6–8.3)
RBC # BLD: 3.42 M/UL — LOW (ref 3.8–5.2)
RBC # FLD: 22.6 % — HIGH (ref 10.3–14.5)
SODIUM SERPL-SCNC: 139 MMOL/L — SIGNIFICANT CHANGE UP (ref 135–145)
WBC # BLD: 18.1 K/UL — HIGH (ref 3.8–10.5)
WBC # FLD AUTO: 18.1 K/UL — HIGH (ref 3.8–10.5)

## 2018-06-01 PROCEDURE — 93010 ELECTROCARDIOGRAM REPORT: CPT

## 2018-06-01 PROCEDURE — 99233 SBSQ HOSP IP/OBS HIGH 50: CPT | Mod: GC

## 2018-06-01 PROCEDURE — 99232 SBSQ HOSP IP/OBS MODERATE 35: CPT

## 2018-06-01 PROCEDURE — 99223 1ST HOSP IP/OBS HIGH 75: CPT

## 2018-06-01 RX ORDER — MORPHINE SULFATE 50 MG/1
0.5 CAPSULE, EXTENDED RELEASE ORAL
Qty: 0 | Refills: 0 | Status: DISCONTINUED | OUTPATIENT
Start: 2018-06-01 | End: 2018-06-07

## 2018-06-01 RX ORDER — SODIUM,POTASSIUM PHOSPHATES 278-250MG
1 POWDER IN PACKET (EA) ORAL THREE TIMES A DAY
Qty: 0 | Refills: 0 | Status: DISCONTINUED | OUTPATIENT
Start: 2018-06-01 | End: 2018-06-01

## 2018-06-01 RX ADMIN — Medication 0.12 MILLIGRAM(S): at 06:34

## 2018-06-01 RX ADMIN — Medication 650 MILLIGRAM(S): at 17:00

## 2018-06-01 RX ADMIN — PANTOPRAZOLE SODIUM 40 MILLIGRAM(S): 20 TABLET, DELAYED RELEASE ORAL at 05:11

## 2018-06-01 RX ADMIN — SODIUM CHLORIDE 100 MILLILITER(S): 9 INJECTION, SOLUTION INTRAVENOUS at 18:14

## 2018-06-01 RX ADMIN — Medication 62.5 MILLIMOLE(S): at 22:42

## 2018-06-01 RX ADMIN — PANTOPRAZOLE SODIUM 40 MILLIGRAM(S): 20 TABLET, DELAYED RELEASE ORAL at 18:13

## 2018-06-01 RX ADMIN — Medication 25 MILLIGRAM(S): at 06:34

## 2018-06-01 RX ADMIN — Medication 650 MILLIGRAM(S): at 15:57

## 2018-06-01 RX ADMIN — Medication 25 MILLIGRAM(S): at 18:13

## 2018-06-01 RX ADMIN — Medication 64 MICROGRAM(S): at 05:11

## 2018-06-01 NOTE — PROGRESS NOTE ADULT - SUBJECTIVE AND OBJECTIVE BOX
Internal Medicine Progress Note    Claire De Paz, PGY1  Internal Medicine, team 1  Pager [phone] / [phone]  After 7PM on weekdays and 12PM on weekends, please page #[phone]    Patient is a 73y old  Female who presents with a chief complaint of Lethargy, abd pain (23 May 2018 16:13)      SUBJECTIVE / OVERNIGHT EVENTS:     MEDICATIONS  (STANDING):  atorvastatin 40 milliGRAM(s) Oral at bedtime  calcium carbonate 500 mG (Tums) Chewable 1 Tablet(s) Chew once  dextrose 5% + sodium chloride 0.9%. 1000 milliLiter(s) (100 mL/Hr) IV Continuous <Continuous>  digoxin     Tablet 0.125 milliGRAM(s) Oral daily  levothyroxine Injectable 64 MICROGram(s) IV Push <User Schedule>  melatonin 3 milliGRAM(s) Oral at bedtime  metoprolol tartrate 25 milliGRAM(s) Oral two times a day  mirtazapine 7.5 milliGRAM(s) Oral daily  pantoprazole  Injectable 40 milliGRAM(s) IV Push two times a day    MEDICATIONS  (PRN):  acetaminophen   Tablet. 650 milliGRAM(s) Oral every 6 hours PRN Mild Pain (1 - 3)  ondansetron Injectable 4 milliGRAM(s) IV Push every 8 hours PRN Nausea and/or Vomiting      Vital Signs Last 24 Hrs  T(C): 36.2 (01 Jun 2018 04:22), Max: 36.7 (31 May 2018 20:01)  T(F): 97.2 (01 Jun 2018 04:22), Max: 98 (31 May 2018 20:01)  HR: 92 (01 Jun 2018 06:32) (70 - 93)  BP: 114/78 (01 Jun 2018 06:32) (77/51 - 114/78)  BP(mean): --  RR: 18 (01 Jun 2018 04:22) (17 - 20)  SpO2: 98% (01 Jun 2018 04:22) (93% - 100%)    CAPILLARY BLOOD GLUCOSE          I&O's Summary    31 May 2018 07:01  -  01 Jun 2018 07:00  --------------------------------------------------------  IN: 1860 mL / OUT: 0 mL / NET: 1860 mL      PHYSICAL EXAM  GENERAL: NAD, alert, appears chronically ill   HEAD:  Atraumatic, temporal wasting  EYES: EOMI, PERRLA, conjunctiva and sclera clear  NECK: Supple, No JVD  CHEST/LUNG: Improved rales, normal resp effort and rate.   HEART: Irregular rate and rhythm; No murmurs, rubs, or gallops  ABDOMEN: Soft, Nontender, Nondistended; Bowel sounds present  EXTREMITIES:  2+ Peripheral Pulses, No clubbing, cyanosis. 1+ pitting pretibial edema  NEURO/PSYCH: AAOx1 (self), aphasia, otherwise nonfocal  SKIN: No rashes or lesions. Pretibial and sacral edema      LABS:                        7.4    15.1  )-----------( 162      ( 31 May 2018 11:18 )             23.6     CBC Full  -  ( 31 May 2018 11:18 )  WBC Count : 15.1 K/uL  Hemoglobin : 7.4 g/dL  Hematocrit : 23.6 %  Platelet Count - Automated : 162 K/uL  Mean Cell Volume : 98.1 fl  Mean Cell Hemoglobin : 30.6 pg  Mean Cell Hemoglobin Concentration : 31.2 gm/dL  Auto Neutrophil # : x  Auto Lymphocyte # : x  Auto Monocyte # : x  Auto Eosinophil # : x  Auto Basophil # : x  Auto Neutrophil % : x  Auto Lymphocyte % : x  Auto Monocyte % : x  Auto Eosinophil % : x  Auto Basophil % : x    05-31    144  |  117<H>  |  19  ----------------------------<  91  4.2   |  17<L>  |  0.78    Ca    7.6<L>      31 May 2018 11:17  Phos  2.0     05-31  Mg     1.9     05-31      Creatinine Trend: 0.78<--, 0.80<--, 0.92<--, 0.89<--, 0.84<--, 0.82<--      MICROBIOLOGY:      RADIOLOGY & ADDITIONAL TESTS:     CONSULTS: endocrinology, palliative care Internal Medicine Progress Note    Claire De Paz, PGY1  Internal Medicine, team 1  Pager [phone] / [phone]  After 7PM on weekdays and 12PM on weekends, please page #[phone]    Patient is a 73y old  Female who presents with a chief complaint of Lethargy, abd pain (23 May 2018 16:13)      SUBJECTIVE / OVERNIGHT EVENTS:     improved mental status on exam today-- more awake denies pain except when touching her legs. continues to have liquid black stool  denies cp, sob. aaox 2 (knows name, hospital, names of her children). asking for water.     MEDICATIONS  (STANDING):  atorvastatin 40 milliGRAM(s) Oral at bedtime  calcium carbonate 500 mG (Tums) Chewable 1 Tablet(s) Chew once  dextrose 5% + sodium chloride 0.9%. 1000 milliLiter(s) (100 mL/Hr) IV Continuous <Continuous>  digoxin     Tablet 0.125 milliGRAM(s) Oral daily  levothyroxine Injectable 64 MICROGram(s) IV Push <User Schedule>  melatonin 3 milliGRAM(s) Oral at bedtime  metoprolol tartrate 25 milliGRAM(s) Oral two times a day  mirtazapine 7.5 milliGRAM(s) Oral daily  pantoprazole  Injectable 40 milliGRAM(s) IV Push two times a day    MEDICATIONS  (PRN):  acetaminophen   Tablet. 650 milliGRAM(s) Oral every 6 hours PRN Mild Pain (1 - 3)  ondansetron Injectable 4 milliGRAM(s) IV Push every 8 hours PRN Nausea and/or Vomiting      Vital Signs Last 24 Hrs  T(C): 36.2 (01 Jun 2018 04:22), Max: 36.7 (31 May 2018 20:01)  T(F): 97.2 (01 Jun 2018 04:22), Max: 98 (31 May 2018 20:01)  HR: 92 (01 Jun 2018 06:32) (70 - 93)  BP: 114/78 (01 Jun 2018 06:32) (77/51 - 114/78)  BP(mean): --  RR: 18 (01 Jun 2018 04:22) (17 - 20)  SpO2: 98% (01 Jun 2018 04:22) (93% - 100%)    CAPILLARY BLOOD GLUCOSE          I&O's Summary    31 May 2018 07:01  -  01 Jun 2018 07:00  --------------------------------------------------------  IN: 1860 mL / OUT: 0 mL / NET: 1860 mL      PHYSICAL EXAM  GENERAL: NAD, alert, appears chronically ill   HEAD:  Atraumatic, temporal wasting  EYES: EOMI, PERRLA, conjunctiva and sclera clear  NECK: Supple, No JVD  CHEST/LUNG: Improved rales, normal resp effort and rate.   HEART: Irregular rate and rhythm; No murmurs, rubs, or gallops  ABDOMEN: Soft, Nontender, Nondistended; Bowel sounds present, + incontinent of black liquid stool  EXTREMITIES:  2+ Peripheral Pulses, No clubbing, cyanosis. 1+ pitting pretibial edema  NEURO/PSYCH: AAOx1 (self), aphasia, otherwise nonfocal  SKIN: No rashes or lesions. Pretibial and sacral edema      LABS:                        7.4    15.1  )-----------( 162      ( 31 May 2018 11:18 )             23.6     CBC Full  -  ( 31 May 2018 11:18 )  WBC Count : 15.1 K/uL  Hemoglobin : 7.4 g/dL  Hematocrit : 23.6 %  Platelet Count - Automated : 162 K/uL  Mean Cell Volume : 98.1 fl  Mean Cell Hemoglobin : 30.6 pg  Mean Cell Hemoglobin Concentration : 31.2 gm/dL  Auto Neutrophil # : x  Auto Lymphocyte # : x  Auto Monocyte # : x  Auto Eosinophil # : x  Auto Basophil # : x  Auto Neutrophil % : x  Auto Lymphocyte % : x  Auto Monocyte % : x  Auto Eosinophil % : x  Auto Basophil % : x    05-31    144  |  117<H>  |  19  ----------------------------<  91  4.2   |  17<L>  |  0.78    Ca    7.6<L>      31 May 2018 11:17  Phos  2.0     05-31  Mg     1.9     05-31      Creatinine Trend: 0.78<--, 0.80<--, 0.92<--, 0.89<--, 0.84<--, 0.82<--      MICROBIOLOGY:      RADIOLOGY & ADDITIONAL TESTS:     CONSULTS: endocrinology, palliative care Internal Medicine Progress Note    Claire De Paz, PGY1  Internal Medicine, team 1  Pager [phone] / [phone]  After 7PM on weekdays and 12PM on weekends, please page #[phone]    Patient is a 73y old  Female who presents with a chief complaint of Lethargy, abd pain (23 May 2018 16:13)      SUBJECTIVE / OVERNIGHT EVENTS:     improved mental status on exam today-- more awake denies pain except when touching her legs. continues to have liquid black stool  denies cp, sob. aaox 2 (knows name, hospital, names of her children). asking for water.     MEDICATIONS  (STANDING):  atorvastatin 40 milliGRAM(s) Oral at bedtime  calcium carbonate 500 mG (Tums) Chewable 1 Tablet(s) Chew once  dextrose 5% + sodium chloride 0.9%. 1000 milliLiter(s) (100 mL/Hr) IV Continuous <Continuous>  digoxin     Tablet 0.125 milliGRAM(s) Oral daily  levothyroxine Injectable 64 MICROGram(s) IV Push <User Schedule>  melatonin 3 milliGRAM(s) Oral at bedtime  metoprolol tartrate 25 milliGRAM(s) Oral two times a day  mirtazapine 7.5 milliGRAM(s) Oral daily  pantoprazole  Injectable 40 milliGRAM(s) IV Push two times a day    MEDICATIONS  (PRN):  acetaminophen   Tablet. 650 milliGRAM(s) Oral every 6 hours PRN Mild Pain (1 - 3)  ondansetron Injectable 4 milliGRAM(s) IV Push every 8 hours PRN Nausea and/or Vomiting      Vital Signs Last 24 Hrs  T(C): 36.2 (01 Jun 2018 04:22), Max: 36.7 (31 May 2018 20:01)  T(F): 97.2 (01 Jun 2018 04:22), Max: 98 (31 May 2018 20:01)  HR: 92 (01 Jun 2018 06:32) (70 - 93)  BP: 114/78 (01 Jun 2018 06:32) (77/51 - 114/78)  BP(mean): --  RR: 18 (01 Jun 2018 04:22) (17 - 20)  SpO2: 98% (01 Jun 2018 04:22) (93% - 100%)    CAPILLARY BLOOD GLUCOSE          I&O's Summary    31 May 2018 07:01  -  01 Jun 2018 07:00  --------------------------------------------------------  IN: 1860 mL / OUT: 0 mL / NET: 1860 mL      PHYSICAL EXAM  GENERAL: NAD, alert, appears chronically ill   HEAD:  Atraumatic, temporal wasting  EYES: EOMI, PERRLA, conjunctiva and sclera clear  NECK: Supple, No JVD  CHEST/LUNG: Improved rales, normal resp effort and rate.   HEART: Irregular rate and rhythm; No murmurs, rubs, or gallops  ABDOMEN: Soft, Nontender, Nondistended; Bowel sounds present, + incontinent of black liquid stool  EXTREMITIES:  2+ Peripheral Pulses, No clubbing, cyanosis. 1+ pitting pretibial edema  NEURO/PSYCH: AAOx1 (self), aphasia, otherwise nonfocal  SKIN: No rashes or lesions. Pretibial and sacral edema  R. groin with mild erythema- not warm to touch or swollen      LABS:                        7.4    15.1  )-----------( 162      ( 31 May 2018 11:18 )             23.6     CBC Full  -  ( 31 May 2018 11:18 )  WBC Count : 15.1 K/uL  Hemoglobin : 7.4 g/dL  Hematocrit : 23.6 %  Platelet Count - Automated : 162 K/uL  Mean Cell Volume : 98.1 fl  Mean Cell Hemoglobin : 30.6 pg  Mean Cell Hemoglobin Concentration : 31.2 gm/dL  Auto Neutrophil # : x  Auto Lymphocyte # : x  Auto Monocyte # : x  Auto Eosinophil # : x  Auto Basophil # : x  Auto Neutrophil % : x  Auto Lymphocyte % : x  Auto Monocyte % : x  Auto Eosinophil % : x  Auto Basophil % : x    05-31    144  |  117<H>  |  19  ----------------------------<  91  4.2   |  17<L>  |  0.78    Ca    7.6<L>      31 May 2018 11:17  Phos  2.0     05-31  Mg     1.9     05-31      Creatinine Trend: 0.78<--, 0.80<--, 0.92<--, 0.89<--, 0.84<--, 0.82<--      MICROBIOLOGY:      RADIOLOGY & ADDITIONAL TESTS:     CONSULTS: endocrinology, palliative care Internal Medicine Progress Note    Claire De Paz, PGY1  Internal Medicine, team 1  Pager [phone] / [phone]  After 7PM on weekdays and 12PM on weekends, please page #[phone]    Patient is a 73y old  Female who presents with a chief complaint of Lethargy, abd pain (23 May 2018 16:13)      SUBJECTIVE / OVERNIGHT EVENTS:   improved mental status on exam today-- more awake denies pain except when touching her legs. continues to have liquid black stool  denies cp, sob. aaox 2 (knows name, hospital, names of her children). asking for water.     MEDICATIONS  (STANDING):  atorvastatin 40 milliGRAM(s) Oral at bedtime  calcium carbonate 500 mG (Tums) Chewable 1 Tablet(s) Chew once  dextrose 5% + sodium chloride 0.9%. 1000 milliLiter(s) (100 mL/Hr) IV Continuous <Continuous>  digoxin     Tablet 0.125 milliGRAM(s) Oral daily  levothyroxine Injectable 64 MICROGram(s) IV Push <User Schedule>  melatonin 3 milliGRAM(s) Oral at bedtime  metoprolol tartrate 25 milliGRAM(s) Oral two times a day  mirtazapine 7.5 milliGRAM(s) Oral daily  pantoprazole  Injectable 40 milliGRAM(s) IV Push two times a day    MEDICATIONS  (PRN):  acetaminophen   Tablet. 650 milliGRAM(s) Oral every 6 hours PRN Mild Pain (1 - 3)  ondansetron Injectable 4 milliGRAM(s) IV Push every 8 hours PRN Nausea and/or Vomiting      Vital Signs Last 24 Hrs  T(C): 36.2 (01 Jun 2018 04:22), Max: 36.7 (31 May 2018 20:01)  T(F): 97.2 (01 Jun 2018 04:22), Max: 98 (31 May 2018 20:01)  HR: 92 (01 Jun 2018 06:32) (70 - 93)  BP: 114/78 (01 Jun 2018 06:32) (77/51 - 114/78)  BP(mean): --  RR: 18 (01 Jun 2018 04:22) (17 - 20)  SpO2: 98% (01 Jun 2018 04:22) (93% - 100%)    CAPILLARY BLOOD GLUCOSE          I&O's Summary    31 May 2018 07:01  -  01 Jun 2018 07:00  --------------------------------------------------------  IN: 1860 mL / OUT: 0 mL / NET: 1860 mL      PHYSICAL EXAM  GENERAL: NAD, alert, appears chronically ill   HEAD:  Atraumatic, temporal wasting  EYES: EOMI, PERRLA, conjunctiva and sclera clear  NECK: Supple, No JVD  CHEST/LUNG: Improved rales, normal resp effort and rate.   HEART: Irregular rate and rhythm; No murmurs, rubs, or gallops  ABDOMEN: Soft, Nontender, Nondistended; Bowel sounds present, + incontinent of black liquid stool  EXTREMITIES:  2+ Peripheral Pulses, No clubbing, cyanosis. 1+ pitting pretibial edema  NEURO/PSYCH: AAOx1 (self), aphasia, otherwise nonfocal  SKIN: No rashes or lesions. Pretibial and sacral edema  R. groin with mild erythema- not warm to touch or swollen      LABS:                        7.4    15.1  )-----------( 162      ( 31 May 2018 11:18 )             23.6     CBC Full  -  ( 31 May 2018 11:18 )  WBC Count : 15.1 K/uL  Hemoglobin : 7.4 g/dL  Hematocrit : 23.6 %  Platelet Count - Automated : 162 K/uL  Mean Cell Volume : 98.1 fl  Mean Cell Hemoglobin : 30.6 pg  Mean Cell Hemoglobin Concentration : 31.2 gm/dL  Auto Neutrophil # : x  Auto Lymphocyte # : x  Auto Monocyte # : x  Auto Eosinophil # : x  Auto Basophil # : x  Auto Neutrophil % : x  Auto Lymphocyte % : x  Auto Monocyte % : x  Auto Eosinophil % : x  Auto Basophil % : x    05-31    144  |  117<H>  |  19  ----------------------------<  91  4.2   |  17<L>  |  0.78    Ca    7.6<L>      31 May 2018 11:17  Phos  2.0     05-31  Mg     1.9     05-31      Creatinine Trend: 0.78<--, 0.80<--, 0.92<--, 0.89<--, 0.84<--, 0.82<--      MICROBIOLOGY:      RADIOLOGY & ADDITIONAL TESTS:     CONSULTS: endocrinology, palliative care

## 2018-06-01 NOTE — DIETITIAN INITIAL EVALUATION ADULT. - PHYSICAL APPEARANCE
thin, pt sleeping/lethargic unable to perform NFPE, appears with visual muscle loss on shoulders./underweight

## 2018-06-01 NOTE — DIETITIAN INITIAL EVALUATION ADULT. - NS AS NUTRI INTERV COLLABORAT
Malnutrition notification electronically placed in documents section in [place], nutrition intervention discussed with medical provider.

## 2018-06-01 NOTE — PROGRESS NOTE ADULT - PROBLEM SELECTOR PLAN 1
Repeat Free T4 remained low despite > 1 week of treatment on 75 mcg daily. Recommended increase to 88 mcg po daily or 64 IV daily for improved absorption while hospitalized. Can repeat Free T4 on 6/5/18. If within normal limits can discharge on 88 mcg po daily.

## 2018-06-01 NOTE — PROGRESS NOTE ADULT - PROBLEM SELECTOR PLAN 5
Multifactorial sepsis, acute GIB, AF RVR now resolved, decreased PO intake. RRT for hypotension 5/25  -improving and stabilized today after receiving IVF  -cont to monitor closely  -Per family goals of care discussion, no central line or pressors are to be used for hypotension even if unresponsive to fluids  -AM cortisol elevated 22, no concern for adrenal insufficiency Multifactorial and now likely 2/2 GI bleeding, decreased PO intake. RRT for hypotension 5/25  -improving and stabilized today after receiving IVF  -cont to monitor closely  -Per family goals of care discussion, no central line or pressors are to be used for hypotension even if unresponsive to fluids  -AM cortisol elevated 22, no concern for adrenal insufficiency

## 2018-06-01 NOTE — PROGRESS NOTE ADULT - PROBLEM SELECTOR PLAN 4
Home regimen synthroid 75 mcg daily per pharmacy. Per daughter, pt reported she wasn't taking her medications the 2 weeks prior to discharge because she felt too sick.  -TSH significantly elevated 31.9. Tree T4 and T3 low  -Per endocrine, switch to synthroid 64mcgIV daily then discharge on 88mcg PO. Will need repeat TSH in 4 weeks Home regimen synthroid 75 mcg daily per pharmacy. Per daughter, pt reported she wasn't taking her medications the 2 weeks prior to discharge because she felt too sick.  -TSH significantly elevated 31.9. Tree T4 and T3 low  -T4 did not improve after being treated with synthroid x 1 week  -Per endocrine, switch to synthroid 64mcgIV daily then discharge on 88mcg PO. Will need repeat TSH in 4 weeks

## 2018-06-01 NOTE — PROGRESS NOTE ADULT - PROBLEM SELECTOR PLAN 2
GOC readdressed with pt's children. Pt's son  Reymundo Rivera is the HCP (documentation in chart). Comfort measures. He is open to palliative care consult  -Blood draws every other day. IVF, blood transfusions acceptable.  -Palliative care consulted, halt transfer plans to rehab

## 2018-06-01 NOTE — DIETITIAN INITIAL EVALUATION ADULT. - NS AS NUTRI INTERV MEALS SNACK
Recommend resuming mechanical soft diet as medically feasible. Encourage po intake with nutrient dense foods. Provide food preferences as able. Monitor weight, lab values, po intake and GI tolerance. RD to remain available for further nutrition interventions as indicated.

## 2018-06-01 NOTE — DIETITIAN INITIAL EVALUATION ADULT. - PROBLEM SELECTOR PLAN 8
Discussed with radha's son, Dr. Rivera at length. Patient wishes to be full code, as patient's son is getting  in August.

## 2018-06-01 NOTE — PROGRESS NOTE ADULT - PROBLEM SELECTOR PLAN 3
Pt reporting right foot pain. Right toes were are erythematous and cool to touch. Right pedal pulse palpable and present on doppler.  -Pt refused NADIRA bilateral LE study. HCP aware. Pt is high risk for GIB if anticoagulated Pt reporting right foot pain. Right toes were are erythematous and cool to touch. Right pedal pulse palpable and present on doppler. Likely underlying PVD.  -Pt refused NADIRA bilateral LE study. HCP aware. Pt is high risk for GIB if anticoagulated  -continue statin

## 2018-06-01 NOTE — DIETITIAN INITIAL EVALUATION ADULT. - NUTRITION INTERVENTION
Meals and Snack/Collaboration and Referral of Nutrition Care/Feeding Assistance/Medical Food Supplements Medical Food Supplements/Feeding Assistance/Mineral/Collaboration and Referral of Nutrition Care/Meals and Snack

## 2018-06-01 NOTE — PROGRESS NOTE ADULT - PROBLEM SELECTOR PLAN 6
Improved leukocytosis 28 on admission. Tachycardia on admission 2/2 afib with RVR. Unclear whether presence of SIRs is reactive 2/2 malignancy and blood loss vs infectious. Compressive atelectasis on CT, but cannot r/o pneumonia. Per son who is physician, pleural effusion is chronic and has been tapped in past. No other concerning signs for pneumonia at this time. Other possible source of infection is intraabdominal.  -s/p zosyn 5-d course to broadly cover infection, but no source was found.  -BCx no growth to date. C diff negative. Pt is incontinent and edema makes straight cath difficult, but denies dysuria  -Procalcitonin 0.46 indeterminate  -Patient and family would like to pursue comfort measures (antibiotics were discontinued on 5/26)  -low suspicion for active infection at this time Improved leukocytosis 28 on admission. Tachycardia on admission 2/2 afib with RVR. Unclear whether presence of SIRs is reactive 2/2 malignancy and blood loss vs infectious. Compressive atelectasis on CT, but cannot r/o pneumonia. Per son who is physician, pleural effusion is chronic and has been tapped in past. No other concerning signs for pneumonia at this time. Other possible source of infection was intraabdominal.  -s/p zosyn 5-d course to broadly cover infection, but no source was found.  -BCx no growth to date. C diff negative x 2. Pt is incontinent and edema makes straight cath difficult, but denies dysuria  -Procalcitonin 0.46 indeterminate  -Patient and family would like to pursue comfort measures (antibiotics were discontinued on 5/26)  -low suspicion for active infection at this time

## 2018-06-01 NOTE — PROGRESS NOTE ADULT - SUBJECTIVE AND OBJECTIVE BOX
Chief Complaint: Evaluating this 74 y/o F for hypothyroidism.      Interval History: Unable to obtain ROS fully due to aphasia. No chest pain.     MEDICATIONS  (STANDING):  atorvastatin 40 milliGRAM(s) Oral at bedtime  calcium carbonate 500 mG (Tums) Chewable 1 Tablet(s) Chew once  dextrose 5% + sodium chloride 0.9%. 1000 milliLiter(s) (100 mL/Hr) IV Continuous <Continuous>  digoxin     Tablet 0.125 milliGRAM(s) Oral daily  levothyroxine Injectable 64 MICROGram(s) IV Push <User Schedule>  melatonin 3 milliGRAM(s) Oral at bedtime  metoprolol tartrate 25 milliGRAM(s) Oral two times a day  mirtazapine 7.5 milliGRAM(s) Oral daily  pantoprazole  Injectable 40 milliGRAM(s) IV Push two times a day    MEDICATIONS  (PRN):  acetaminophen   Tablet. 650 milliGRAM(s) Oral every 6 hours PRN Mild Pain (1 - 3)  ondansetron Injectable 4 milliGRAM(s) IV Push every 8 hours PRN Nausea and/or Vomiting      Allergies    No Known Allergies    Intolerances      Review of Systems: Unable to obtain fully due to aphasia      PHYSICAL EXAM:  VITALS: T(C): 36.4 (06-01-18 @ 11:59)  T(F): 97.5 (06-01-18 @ 11:59), Max: 98 (05-31-18 @ 20:01)  HR: 73 (06-01-18 @ 11:59) (70 - 93)  BP: 99/66 (06-01-18 @ 11:59) (77/51 - 114/78)  RR:  (17 - 18)  SpO2:  (93% - 100%)  Wt(kg): --  GENERAL: NAD at this time  EYES: EOMI, No proptosis  HEENT:  Atraumatic, Normocephalic,   RESPIRATORY: full excursion, non labored  CARDIOVASCULAR: irregular rhythm; normal S1/S2, + peripheral edema  GI: Soft, nontender, non distended, normal bowel sounds  SKIN: Warm and dry  PSYCH: AOX1-2      POCT Blood Glucose.: 81 mg/dL (05-29-18 @ 16:02)  POCT Blood Glucose.: 69 mg/dL (05-29-18 @ 16:00)        06-01    139  |  115<H>  |  19  ----------------------------<  76  5.0   |  13<L>  |  0.70    EGFR if : 99  EGFR if non : 85    Ca    7.3<L>      06-01  Mg     1.9     06-01  Phos  2.3     06-01    TPro  5.4<L>  /  Alb  1.5<L>  /  TBili  0.3  /  DBili  x   /  AST  22  /  ALT  11  /  AlkPhos  125<H>  06-01        Thyroid Function Tests:  05-29 @ 16:05 TSH -- FreeT4 0.3 T3 -- Anti TPO -- Anti Thyroglobulin Ab -- TSI --  05-27 @ 12:16 TSH 32.96 FreeT4 -- T3 -- Anti TPO -- Anti Thyroglobulin Ab -- TSI --

## 2018-06-01 NOTE — CHART NOTE - NSCHARTNOTEFT_GEN_A_CORE
Upon Nutritional Assessment by the Registered Dietitian your patient was determined to meet criteria / has evidence of the following diagnosis/diagnoses:          [ ]  Mild Protein Calorie Malnutrition        [ ]  Moderate Protein Calorie Malnutrition        [x] Severe Protein Calorie Malnutrition        [ ] Unspecified Protein Calorie Malnutrition        [ ] Underweight / BMI <19        [ ] Morbid Obesity / BMI > 40      Findings as based on:  [x] Comprehensive nutrition assessment   [ ] Nutrition Focused Physical Exam  [x] Other: pt with abd pain, minimal po intake PTA and during admission, visual muscle loss, wt loss likely- wt hx unavailable, 1+ generalized edema, 2+ sharlene. leg edema         Nutrition Plan/Recommendations:    1) Plan to resume mechanical soft diet.   2) Continue Ensure Enlive x2/day.   3) Recommend repleting Phos. Continue to monitor and replete electrolytes prn.   4) Provide feeding assistance and encourage po intake with nutrient dense foods.   5) Provide food preferences as able. Monitor weight, lab values (per GOC), po intake and GI tolerance.   6) RD to remain available for further nutrition interventions as indicated.       PROVIDER Section:     By signing this assessment you are acknowledging and agree with the diagnosis/diagnoses assigned by the Registered Dietitian    Comments:

## 2018-06-01 NOTE — DIETITIAN INITIAL EVALUATION ADULT. - PT NOT SOURCE
confused/per RN, RD attempted to obtain subjective information, pt unable to provide. confused/per RN, RD attempted to obtain subjective information, pt unable to provide or stay awake.

## 2018-06-01 NOTE — CONSULT NOTE ADULT - PROBLEM SELECTOR RECOMMENDATION 9
No plans for current treatment, but [place] is [place] [facility] [place] Focus on comfort at this time, in terms of no aggressive management or diagnostic procedures.

## 2018-06-01 NOTE — PROGRESS NOTE ADULT - PROBLEM SELECTOR PLAN 1
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 Likely contributing to hypotension.  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 Likely contributing to hypotension.  -Patient is comfort care, no plan for scope or to reconsult GI  -Pantoprazole IV BID  -s/p 2u pRBC  -Monitor CBC every other day, maintain active T&S  -1unit today as pt continues to have active bleeding

## 2018-06-01 NOTE — CONSULT NOTE ADULT - SUBJECTIVE AND OBJECTIVE BOX
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 Found with acute-on-chronic anemia likely from underlying malignancy, with AF with RVR. No plans for scoping to evaluate due to goals from family. Also with R foot pain, likely with PVD but patient did not want further studies. Also c/b hypotension, likely from multifactorial cause (GI bleed, decreased PO), with no plans for pressors based on goals. s/p zosyn for possible PNA.    Per son, she was walking about a month ago, but now is more bedbound, with progressively worsening appetite.           PERTINENT PMH REVIEWED:  [ ] YES [ ] NO           SOCIAL HISTORY:   Significant other/partner:  [ ] YES  [ ] NO               Children:  [X] YES  [ ] NO                   Restorationism/Spirituality: Latter day  Substance hx:  [ ] YES   [ ] NO                   Tobacco hx:  [ ] YES  [X ] NO                       Alcohol hx: [ ] YES  [X ] NO         Home Opioid hx:  [X ] YES  [ ] NO (ISTOP#[id number] with oxycodone 5/2018)  Living Situation: [X ] Home  [ ] Long term care  [ ] Rehab [ ] Other    FAMILY HISTORY:  FAMILY HISTORY:  No pertinent family history in first degree relatives    [X ] Family history non-contributory     BASELINE (I)ADLs (prior to admission):  [place]: [ ] total  [ ] moderate [ ] dependent    ADVANCE DIRECTIVES:    DNR [X ] YES [ ] NO                            [X ] Completed  MOLST  [ ] YES [ ] NO                      [ ] Completed  Health Care Proxy [X ] YES  [ ] NO   [X ] Completed  Living Will  [ ] YES [ ] NO             [ ] Surrogate  [X ] HCP  [ ] Guardian:    son  Reymundo Figueredolucie ()     ALLERGIES:   Allergies    No Known Allergies    Intolerances        MEDICATIONS:   MEDICATIONS  (STANDING):  atorvastatin 40 milliGRAM(s) Oral at bedtime  calcium carbonate 500 mG (Tums) Chewable 1 Tablet(s) Chew once  dextrose 5% + sodium chloride 0.9%. 1000 milliLiter(s) (100 mL/Hr) IV Continuous <Continuous>  digoxin     Tablet 0.125 milliGRAM(s) Oral daily  levothyroxine Injectable 64 MICROGram(s) IV Push <User Schedule>  melatonin 3 milliGRAM(s) Oral at bedtime  metoprolol tartrate 25 milliGRAM(s) Oral two times a day  mirtazapine 7.5 milliGRAM(s) Oral daily  pantoprazole  Injectable 40 milliGRAM(s) IV Push two times a day  potassium phosphate / sodium phosphate powder 1 Packet(s) Oral three times a day    MEDICATIONS  (PRN):  acetaminophen   Tablet. 650 milliGRAM(s) Oral every 6 hours PRN Mild Pain (1 - 3)  ondansetron Injectable 4 milliGRAM(s) IV Push every 8 hours PRN Nausea and/or Vomiting      PRESENT SYMPTOMS:  Source: [X ] Patient   [X ] Family   [X ] Team     Pain:                        [ ] No [ X] Yes             [ ] Mild [ ] Moderate [ ] Severe    Onset -  Location - RLE, abdomen  Duration -  Character -  Alleviating/Aggravating -  Radiation -  Timing -  Unable to obtain from patient    Dyspnea:                [X ] No [ ] Yes             [ ] Mild [ ] Moderate [ ] Severe    Anxiety:                  [ ] No [ ] Yes             [ ] Mild [ ] Moderate [ ] Severe    Fatigue:                  [ ] No [X ] Yes             [ ] Mild [X ] Moderate [ ] Severe    Nausea:                  [ ] No [ ] Yes             [ ] Mild [ ] Moderate [ ] Severe    Loss of appetite:   [ ] No [X ] Yes             [ ] Mild [ ] Moderate [ ] Severe    Constipation:        [ ] No [ ] Yes             [ ] Mild [ ] Moderate [ ] Severe    Other Symptoms:  [ ] All other review of systems negative   [X ] Unable to obtain due to poor mentation     Karnofsky Performance Score/Palliative Performance Status Version 2:  30-40%    PHYSICAL EXAM:  Vital Signs Last 24 Hrs  T(C): 36.4 (01 Jun 2018 11:59), Max: 36.7 (31 May 2018 20:01)  T(F): 97.5 (01 Jun 2018 11:59), Max: 98 (31 May 2018 20:01)  HR: 90 (01 Jun 2018 17:04) (70 - 93)  BP: 104/65 (01 Jun 2018 17:04) (84/55 - 114/78)  BP(mean): --  RR: 18 (01 Jun 2018 17:04) (17 - 18)  SpO2: 98% (01 Jun 2018 17:04) (93% - 98%)    General:  [ ] Alert  [ ] Oriented x      [X ] Lethargic  [ ] Agitated   [ ] Cachexia   [ ] Unarousable  [X ] Verbal  [ ] Non-Verbal  [ ] Sedated    HEENT:  [X ] Normal   [ ] Dry mouth   [ ] ET Tube    [ ] Trach  [ ] Oral lesions    Lungs:   [X ] Clear [ ] Tachypnea  [ ] Audible excessive secretions   [ ] Rhonchi        [ ] Right [ ] Left [ ] Bilateral  [ ] Crackles        [ ] Right [ ] Left [ ] Bilateral  [ ] Wheezing     [ ] Right [ ] Left [ ] Bilateral    Cardiovascular:  [X ] Regular [ ] Irregular [ ] Tachycardia   [ ] Bradycardia  [ ] Murmur [ ] Other    Abdomen: [X ] Soft  [ ] Distended   [X ] +BS  [X ] Non tender [ ] Tender  [ ]PEG   [ ]OGT/ NGT   [ ] Ostomy   Last BM:       Genitourinary: [ ] Normal [ ] Incontinent   [ ] Oliguria/Anuria   [ ] Davidson   [X ] No davidson    Musculoskeletal:  [ ] Normal   [X ] Weakness  [ ] Bedbound/Wheelchair bound [ ] Edema    Neurological: [ ] No focal deficits  [ X] Cognitive impairment  [ ] Dysphagia [ ] Dysarthria [ ] Paresis [ ] Other     Skin: [X ] Normal   [ ] Pressure ulcer(s)     [ ] Rash   [ ] Other    LABS: reviewed    06-01    139  |  115<H>  |  19  ----------------------------<  76  5.0   |  13<L>  |  0.70    Ca    7.3<L>      01 Jun 2018 11:12  Phos  2.3     06-01  Mg     1.9     06-01    TPro  5.4<L>  /  Alb  1.5<L>  /  TBili  0.3  /  DBili  x   /  AST  22  /  ALT  11  /  AlkPhos  125<H>  06-01    Shock: [ ] Septic [ ] Cardiogenic [ ] Neurologic [ ] Hypovolemic  Vasopressors x 0  Inotrophs x 0    Protein Calorie Malnutrition: [ ] Mild [ ] Moderate [ ] Severe  Oral Intake: [ ] Unable/mouth care only [ ] Minimal [ X] Moderate [ ] Full Capability  Diet: [ ] NPO [ ] Tube feeds [ ] TPN [X ] Other: soft    RADIOLOGY & ADDITIONAL STUDIES:  CT A/P 5/20  Small to moderate and moderate to large left pleural effusion with   compressive atelectasis. Recommend clinical correlation to assess   underlying pneumonia.    Decreased size of the left renal fossa/retroperitoneal mass since   1/31/2017. Evidence of fistulization between the mass and the adjacent   duodenum and descending colon as described.    Indeterminate hypodense focus in the right hepatic lobe.   Neoplasm/metastasis cannot be excluded. This can be further characterized   on a nonemergent contrast enhanced MRI.    Endometrial thickening. Neoplasm cannot be excluded in a postmenopausal   patient. Recommend clinical correlation and follow-up      REFERRALS:   [ ] Chaplaincy  [ ] Hospice  [ ] Child Life  [ ] Social Work  [ ] Case management [ ] Holistic Therapy

## 2018-06-01 NOTE — PROGRESS NOTE ADULT - ASSESSMENT
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

## 2018-06-01 NOTE — DIETITIAN INITIAL EVALUATION ADULT. - PROBLEM SELECTOR PLAN 6
Per son, patient takes Metoprolol 50 ER BID, which is an abnormal dose. Will hold MEtoprolol for now in setting of hypotension.  Admit to telemetry.  Check TSH.

## 2018-06-01 NOTE — PROGRESS NOTE ADULT - PROBLEM SELECTOR PLAN 8
2/2 underlying sarcoma, duodenal masses with fistula, possible intraabdominal infection. s/p zosyn 5-day course  -Avoid opioids due to mental status

## 2018-06-01 NOTE — CONSULT NOTE ADULT - PROBLEM SELECTOR RECOMMENDATION 5
Discussed with son via phone; he is fully aware of her medical case, and is opting for some comfort measures, including limited blood draws, DNR/DNI, and no aggressive diagnostic interventions. However, [place] [facility] [place] continue to follow.

## 2018-06-01 NOTE — DIETITIAN INITIAL EVALUATION ADULT. - ORAL INTAKE PTA
poor/as per H&P, "Per the son, the patient has been complaining of abdominal pain for the last 2 weeks with diarrhea and nausea, and unable to tolerate PO."

## 2018-06-01 NOTE — PROGRESS NOTE ADULT - ATTENDING COMMENTS
Pt continues to have borderline BPs and confirmed active GI bleeding. S/P 1 unit pRBCs yesterday and will give another unit today. C. diff again is negative. Continue IVF and CLD. Family does not want GI called again for possible intervention. Continue IV protonix.  Pts condition overall appears to be deteriorating and her prognosis is poor. Although family overall wants "comfort measures" the acuity of her clinical status has worsened and at this time I suspect she would not be able to tolerate JETT (which has been their goal). Palliative care consult has been called and pending.  Family has specified they would not want feeding tubes, pressors, central line, intubation, pt is DNR. Will hold off on repeat imaging at this time as no intervention or aggressive measures would be taken outside of what we are currently doing. Low suspicion for new infection at this time so will continue to hold off on antibiotics. Continue remainder of plan per resident note. Pt continues to have borderline BPs and confirmed active GI bleeding. S/P 1 unit pRBCs yesterday and will give another unit today (will give with lasix). C. diff again is negative. Continue IVF and CLD. Family does not want GI called again for possible intervention. Continue IV protonix.  Pts condition overall appears to be deteriorating and her prognosis is poor. Although family overall wants "comfort measures" the acuity of her clinical status has worsened and at this time I suspect she would not be able to tolerate JETT (which has been their goal). Palliative care consult has been called and pending.  Family has specified they would not want feeding tubes, pressors, central line, intubation, pt is DNR. Will hold off on repeat imaging at this time as no intervention or aggressive measures would be taken outside of what we are currently doing. Low suspicion for new infection at this time so will continue to hold off on antibiotics. Continue remainder of plan per resident note.

## 2018-06-01 NOTE — CONSULT NOTE ADULT - PROBLEM SELECTOR RECOMMENDATION 4
Patient with significant RLE pain. Patient with lethargy and some delirium (per son, is not always making sense even when speaking Swedish), but would try a low-dose morphine (0.5mg IV Q3 PRN moderate-severe pain) and monitor for pain relief and any adverse effects. Unclear etiology of pain, maybe due to vascular disease, but no plans for NADIRA/PVR based on goals of care.

## 2018-06-01 NOTE — DIETITIAN INITIAL EVALUATION ADULT. - ENERGY NEEDS
Ht: 60“, Wt: 99.2lbs, BMI: 19.4kg/m2, IBW: 100 lbs (+/-10%), %IBW: 99%  Pertinent Information: 74 yo F PMH of Afib (not on AC 2/2 GIB), 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, partial pancreatectomy and nephrectomy about a year ago, CVA with residual R sided weakness and hypothyroidism 2/2 immunotherapy who presents for lethargy and diffuse abdominal pain x 2 weeks found to have acute on chronic anemia suspected to be secondary to acute blood loss from underlying malignancy s/p 2u pRBC, complicated by AF RVR, now rate controlled on digoxin and lopressor, currently focus on comfort care but family would like pt to go to [facility] after discharge, however she continues to decline and now again with active GI bleeding. Palliative care to consult to readdress GOC, explore possibilities of hospice,  1+ generalized, 2+ sharlene. leg edema, no pressure injury

## 2018-06-01 NOTE — PROGRESS NOTE ADULT - PROBLEM SELECTOR PLAN 9
Home regimen lopressor 50mg BID  -continue lopressor 25 BID, monitor for hypotension  -continue dig 0.125mg daily, dig level wnl 5/29  -no AC 2/2 bleeding risk. Monitor off tele

## 2018-06-01 NOTE — DIETITIAN INITIAL EVALUATION ADULT. - OTHER INFO
Nutrition assessment for length of stay. Limited wt and nutrition hx at this time. Pt noted with very poor/minimal po intake during admission as well as unable to tolerate po intake for about 2 weeks PTA, no other nutrition hx available at this time. No previous wt hx in [facility], current admission wt 99.2 pounds, 5/30 wt of 98.5 pounds, noted higher bed weights during admission up to 106.4 pounds- question bed scale accuracy.  Per RN, no N+V, pt had loose stools and C.diff was negative.

## 2018-06-01 NOTE — CONSULT NOTE ADULT - ASSESSMENT
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 Found with acute-on-chronic anemia likely from underlying malignancy, with AF with RVR. No plans for scoping to evaluate due to goals from family. Also with R foot pain, likely with PVD but patient did not want further studies. Also c/b hypotension, likely from multifactorial cause (GI bleed, decreased PO), with no plans for pressors based on goals. s/p zosyn for possible PNA. Palliative called for further goals of care.

## 2018-06-02 PROCEDURE — 99233 SBSQ HOSP IP/OBS HIGH 50: CPT

## 2018-06-02 RX ADMIN — MORPHINE SULFATE 0.5 MILLIGRAM(S): 50 CAPSULE, EXTENDED RELEASE ORAL at 08:29

## 2018-06-02 RX ADMIN — MIRTAZAPINE 7.5 MILLIGRAM(S): 45 TABLET, ORALLY DISINTEGRATING ORAL at 21:36

## 2018-06-02 RX ADMIN — Medication 25 MILLIGRAM(S): at 17:23

## 2018-06-02 RX ADMIN — MORPHINE SULFATE 0.5 MILLIGRAM(S): 50 CAPSULE, EXTENDED RELEASE ORAL at 08:45

## 2018-06-02 RX ADMIN — SODIUM CHLORIDE 30 MILLILITER(S): 9 INJECTION, SOLUTION INTRAVENOUS at 08:30

## 2018-06-02 RX ADMIN — Medication 64 MICROGRAM(S): at 05:48

## 2018-06-02 RX ADMIN — Medication 0.12 MILLIGRAM(S): at 05:48

## 2018-06-02 RX ADMIN — ATORVASTATIN CALCIUM 40 MILLIGRAM(S): 80 TABLET, FILM COATED ORAL at 21:36

## 2018-06-02 RX ADMIN — PANTOPRAZOLE SODIUM 40 MILLIGRAM(S): 20 TABLET, DELAYED RELEASE ORAL at 17:24

## 2018-06-02 RX ADMIN — Medication 3 MILLIGRAM(S): at 21:36

## 2018-06-02 RX ADMIN — PANTOPRAZOLE SODIUM 40 MILLIGRAM(S): 20 TABLET, DELAYED RELEASE ORAL at 05:48

## 2018-06-02 RX ADMIN — SODIUM CHLORIDE 100 MILLILITER(S): 9 INJECTION, SOLUTION INTRAVENOUS at 06:40

## 2018-06-02 RX ADMIN — Medication 25 MILLIGRAM(S): at 05:48

## 2018-06-02 NOTE — PROGRESS NOTE ADULT - ASSESSMENT
72 yo F PMH of Afib (not on AC 2/2 GIB), 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, partial pancreatectomy and nephrectomy about a year ago, CVA with residual R sided weakness and hypothyroidism 2/2 immunotherapy who presents for lethargy and diffuse abdominal pain x 2 weeks found to have acute on chronic anemia suspected to be secondary to acute blood loss from underlying malignancy s/p 2u pRBC, complicated by AF RVR, now rate controlled on digoxin and lopressor, currently focus on comfort care, but with plans/goals of family for [facility] following d/c 72 yo F PMH of Afib (not on AC 2/2 GIB), 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, partial pancreatectomy and nephrectomy about a year ago, CVA with residual R sided weakness and aphasia, and hypothyroidism 2/2 immunotherapy who presents for lethargy and diffuse abdominal pain x 2 weeks found to have acute on chronic anemia suspected to be secondary to acute blood loss from underlying malignancy eroding into duodenum and colon s/p 3u pRBC, complicated by AF RVR now rate controlled on digoxin and lopressor, currently focus on comfort care.

## 2018-06-02 NOTE — PROGRESS NOTE ADULT - PROBLEM SELECTOR PLAN 2
GOC readdressed with pt's children. Pt's son  Reymundo Rivera is the HCP (documentation in chart). Comfort measures. He is open to palliative care consult  -Blood draws every other day. IVF, blood transfusions acceptable.  -Palliative care consulted, halt transfer plans to rehab GOC readdressed with pt's children. Pt's son  Reymundo Rivera is the HCP (documentation in chart). Comfort measures  -Blood draws every other day. IVF, blood transfusions acceptable. No central lines, pressors, scope  -Palliative care consulted, halt transfer plans to rehab

## 2018-06-02 NOTE — PROGRESS NOTE ADULT - PROBLEM SELECTOR PLAN 9
Home regimen lopressor 50mg BID  -continue lopressor 25 BID, monitor for hypotension  -continue dig 0.125mg daily, dig level wnl 5/29  -no AC 2/2 bleeding risk. Monitor off tele Home regimen lopressor 50mg BID  -continue lopressor 25 BID, monitor for hypotension  -continue dig 0.125mg daily, dig level wnl 5/29  -no AC 2/2 GIB. Monitor off tele

## 2018-06-02 NOTE — PROGRESS NOTE ADULT - PROBLEM SELECTOR PLAN 3
Pt reporting right foot pain. Right toes were are erythematous and cool to touch. Right pedal pulse palpable and present on doppler. Likely underlying PVD.  -Pt refused NADIRA bilateral LE study. HCP aware. Pt is high risk for GIB if anticoagulated  -continue statin Pt reporting right foot pain. Right toes are erythematous and cool to touch. Right pedal pulse palpable and present on doppler. Likely underlying PVD.  -Pt refused NADIRA study. HCP aware. Pt is high risk for GIB if anticoagulated  -continue statin

## 2018-06-02 NOTE — PROGRESS NOTE ADULT - SUBJECTIVE AND OBJECTIVE BOX
Internal Medicine Progress Note    Claire De Paz, PGY1  Internal Medicine, team 1  Pager [phone] / [phone]  After 7PM on weekdays and 12PM on weekends, please page #[phone]    Patient is a 73y old  Female who presents with a chief complaint of Lethargy, abd pain (23 May 2018 16:13)      SUBJECTIVE / OVERNIGHT EVENTS:       MEDICATIONS  (STANDING):  atorvastatin 40 milliGRAM(s) Oral at bedtime  calcium carbonate 500 mG (Tums) Chewable 1 Tablet(s) Chew once  dextrose 5% + sodium chloride 0.9%. 1000 milliLiter(s) (100 mL/Hr) IV Continuous <Continuous>  digoxin     Tablet 0.125 milliGRAM(s) Oral daily  levothyroxine Injectable 64 MICROGram(s) IV Push <User Schedule>  melatonin 3 milliGRAM(s) Oral at bedtime  metoprolol tartrate 25 milliGRAM(s) Oral two times a day  mirtazapine 7.5 milliGRAM(s) Oral daily  pantoprazole  Injectable 40 milliGRAM(s) IV Push two times a day    MEDICATIONS  (PRN):  acetaminophen   Tablet. 650 milliGRAM(s) Oral every 6 hours PRN Mild Pain (1 - 3)  morphine  - Injectable 0.5 milliGRAM(s) IV Push every 3 hours PRN mod-severe pain  ondansetron Injectable 4 milliGRAM(s) IV Push every 8 hours PRN Nausea and/or Vomiting      Vital Signs Last 24 Hrs  T(C): 36.8 (02 Jun 2018 04:49), Max: 36.8 (02 Jun 2018 04:49)  T(F): 98.3 (02 Jun 2018 04:49), Max: 98.3 (02 Jun 2018 04:49)  HR: 71 (02 Jun 2018 04:49) (67 - 90)  BP: 117/73 (02 Jun 2018 04:49) (99/66 - 117/73)  BP(mean): --  RR: 18 (02 Jun 2018 04:49) (18 - 18)  SpO2: 95% (02 Jun 2018 04:49) (93% - 100%)    CAPILLARY BLOOD GLUCOSE          I&O's Summary    31 May 2018 07:01  -  01 Jun 2018 07:00  --------------------------------------------------------  IN: 1860 mL / OUT: 0 mL / NET: 1860 mL    01 Jun 2018 07:01  -  02 Jun 2018 06:54  --------------------------------------------------------  IN: 1530 mL / OUT: 0 mL / NET: 1530 mL        PHYSICAL EXAM  GENERAL: NAD, alert, appears chronically ill   HEAD:  Atraumatic, temporal wasting  EYES: EOMI, PERRLA, conjunctiva and sclera clear  NECK: Supple, No JVD  CHEST/LUNG: Improved rales, normal resp effort and rate.   HEART: Irregular rate and rhythm; No murmurs, rubs, or gallops  ABDOMEN: Soft, Nontender, Nondistended; Bowel sounds present, + incontinent of black liquid stool  EXTREMITIES:  2+ Peripheral Pulses, No clubbing, cyanosis. 1+ pitting pretibial edema  NEURO/PSYCH: AAOx1 (self), aphasia, otherwise nonfocal  SKIN: No rashes or lesions. Pretibial and sacral edema  R. groin with mild erythema- not warm to touch or swollen      LABS:                         10.2   18.1  )-----------( 129      ( 01 Jun 2018 12:34 )             32.8     CBC Full  -  ( 01 Jun 2018 12:34 )  WBC Count : 18.1 K/uL  Hemoglobin : 10.2 g/dL  Hematocrit : 32.8 %  Platelet Count - Automated : 129 K/uL  Mean Cell Volume : 95.7 fl  Mean Cell Hemoglobin : 29.7 pg  Mean Cell Hemoglobin Concentration : 31.0 gm/dL  Auto Neutrophil # : x  Auto Lymphocyte # : x  Auto Monocyte # : x  Auto Eosinophil # : x  Auto Basophil # : x  Auto Neutrophil % : x  Auto Lymphocyte % : x  Auto Monocyte % : x  Auto Eosinophil % : x  Auto Basophil % : x    06-01    139  |  115<H>  |  19  ----------------------------<  76  5.0   |  13<L>  |  0.70    Ca    7.3<L>      01 Jun 2018 11:12  Phos  2.3     06-01  Mg     1.9     06-01    TPro  5.4<L>  /  Alb  1.5<L>  /  TBili  0.3  /  DBili  x   /  AST  22  /  ALT  11  /  AlkPhos  125<H>  06-01    Creatinine Trend: 0.70<--, 0.78<--, 0.80<--, 0.92<--, 0.89<--, 0.84<--  LIVER FUNCTIONS - ( 01 Jun 2018 11:12 )  Alb: 1.5 g/dL / Pro: 5.4 g/dL / ALK PHOS: 125 U/L / ALT: 11 U/L / AST: 22 U/L / GGT: x               MICROBIOLOGY:      RADIOLOGY & ADDITIONAL TESTS:     CONSULTS: endocrinology, palliative care Internal Medicine Progress Note    Claire De Paz, PGY1  Internal Medicine, team 1  Pager [phone] / [phone]  After 7PM on weekdays and 12PM on weekends, please page #[phone]    Patient is a 73y old  Female who presents with a chief complaint of Lethargy, abd pain (23 May 2018 16:13)      SUBJECTIVE / OVERNIGHT EVENTS: Pt had 3 melanotic watery stools yesterday, 1 overnight. C diffe neg.      MEDICATIONS  (STANDING):  atorvastatin 40 milliGRAM(s) Oral at bedtime  calcium carbonate 500 mG (Tums) Chewable 1 Tablet(s) Chew once  dextrose 5% + sodium chloride 0.9%. 1000 milliLiter(s) (100 mL/Hr) IV Continuous <Continuous>  digoxin     Tablet 0.125 milliGRAM(s) Oral daily  levothyroxine Injectable 64 MICROGram(s) IV Push <User Schedule>  melatonin 3 milliGRAM(s) Oral at bedtime  metoprolol tartrate 25 milliGRAM(s) Oral two times a day  mirtazapine 7.5 milliGRAM(s) Oral daily  pantoprazole  Injectable 40 milliGRAM(s) IV Push two times a day    MEDICATIONS  (PRN):  acetaminophen   Tablet. 650 milliGRAM(s) Oral every 6 hours PRN Mild Pain (1 - 3)  morphine  - Injectable 0.5 milliGRAM(s) IV Push every 3 hours PRN mod-severe pain  ondansetron Injectable 4 milliGRAM(s) IV Push every 8 hours PRN Nausea and/or Vomiting      Vital Signs Last 24 Hrs  T(C): 36.8 (02 Jun 2018 04:49), Max: 36.8 (02 Jun 2018 04:49)  T(F): 98.3 (02 Jun 2018 04:49), Max: 98.3 (02 Jun 2018 04:49)  HR: 71 (02 Jun 2018 04:49) (67 - 90)  BP: 117/73 (02 Jun 2018 04:49) (99/66 - 117/73)  BP(mean): --  RR: 18 (02 Jun 2018 04:49) (18 - 18)  SpO2: 95% (02 Jun 2018 04:49) (93% - 100%)    CAPILLARY BLOOD GLUCOSE          I&O's Summary    31 May 2018 07:01  -  01 Jun 2018 07:00  --------------------------------------------------------  IN: 1860 mL / OUT: 0 mL / NET: 1860 mL    01 Jun 2018 07:01  -  02 Jun 2018 06:54  --------------------------------------------------------  IN: 1530 mL / OUT: 0 mL / NET: 1530 mL        PHYSICAL EXAM  GENERAL: NAD, alert, appears chronically ill   HEAD:  Atraumatic, temporal wasting  EYES: EOMI, PERRLA, conjunctiva and sclera clear  NECK: Supple, No JVD  CHEST/LUNG: Improved rales, normal resp effort and rate.   HEART: Irregular rate and rhythm; No murmurs, rubs, or gallops  ABDOMEN: Soft, Nontender, Nondistended; Bowel sounds present, + incontinent of black liquid stool  EXTREMITIES:  2+ Peripheral Pulses, No clubbing, cyanosis. 1+ pitting pretibial edema  NEURO/PSYCH: AAOx1 (self), aphasia, otherwise nonfocal  SKIN: No rashes or lesions. Pretibial and sacral edema  R. groin with mild erythema- not warm to touch or swollen      LABS:                         10.2   18.1  )-----------( 129      ( 01 Jun 2018 12:34 )             32.8     CBC Full  -  ( 01 Jun 2018 12:34 )  WBC Count : 18.1 K/uL  Hemoglobin : 10.2 g/dL  Hematocrit : 32.8 %  Platelet Count - Automated : 129 K/uL  Mean Cell Volume : 95.7 fl  Mean Cell Hemoglobin : 29.7 pg  Mean Cell Hemoglobin Concentration : 31.0 gm/dL  Auto Neutrophil # : x  Auto Lymphocyte # : x  Auto Monocyte # : x  Auto Eosinophil # : x  Auto Basophil # : x  Auto Neutrophil % : x  Auto Lymphocyte % : x  Auto Monocyte % : x  Auto Eosinophil % : x  Auto Basophil % : x    06-01    139  |  115<H>  |  19  ----------------------------<  76  5.0   |  13<L>  |  0.70    Ca    7.3<L>      01 Jun 2018 11:12  Phos  2.3     06-01  Mg     1.9     06-01    TPro  5.4<L>  /  Alb  1.5<L>  /  TBili  0.3  /  DBili  x   /  AST  22  /  ALT  11  /  AlkPhos  125<H>  06-01    Creatinine Trend: 0.70<--, 0.78<--, 0.80<--, 0.92<--, 0.89<--, 0.84<--  LIVER FUNCTIONS - ( 01 Jun 2018 11:12 )  Alb: 1.5 g/dL / Pro: 5.4 g/dL / ALK PHOS: 125 U/L / ALT: 11 U/L / AST: 22 U/L / GGT: x               MICROBIOLOGY:      RADIOLOGY & ADDITIONAL TESTS:     CONSULTS: endocrinology, palliative care Internal Medicine Progress Note    Claire De Paz, PGY1  Internal Medicine, team 1  Pager [phone] / [phone]  After 7PM on weekdays and 12PM on weekends, please page #[phone]    Patient is a 73y old  Female who presents with a chief complaint of Lethargy, abd pain (23 May 2018 16:13)      SUBJECTIVE / OVERNIGHT EVENTS: Pt had 3 melanotic watery stools yesterday, 1 overnight. C diffe neg. Pt says she is OK. Endorsing abdominal and R foot pain. Received morphine 0.5mg.      MEDICATIONS  (STANDING):  atorvastatin 40 milliGRAM(s) Oral at bedtime  calcium carbonate 500 mG (Tums) Chewable 1 Tablet(s) Chew once  dextrose 5% + sodium chloride 0.9%. 1000 milliLiter(s) (100 mL/Hr) IV Continuous <Continuous>  digoxin     Tablet 0.125 milliGRAM(s) Oral daily  levothyroxine Injectable 64 MICROGram(s) IV Push <User Schedule>  melatonin 3 milliGRAM(s) Oral at bedtime  metoprolol tartrate 25 milliGRAM(s) Oral two times a day  mirtazapine 7.5 milliGRAM(s) Oral daily  pantoprazole  Injectable 40 milliGRAM(s) IV Push two times a day    MEDICATIONS  (PRN):  acetaminophen   Tablet. 650 milliGRAM(s) Oral every 6 hours PRN Mild Pain (1 - 3)  morphine  - Injectable 0.5 milliGRAM(s) IV Push every 3 hours PRN mod-severe pain  ondansetron Injectable 4 milliGRAM(s) IV Push every 8 hours PRN Nausea and/or Vomiting      Vital Signs Last 24 Hrs  T(C): 36.8 (02 Jun 2018 04:49), Max: 36.8 (02 Jun 2018 04:49)  T(F): 98.3 (02 Jun 2018 04:49), Max: 98.3 (02 Jun 2018 04:49)  HR: 71 (02 Jun 2018 04:49) (67 - 90)  BP: 117/73 (02 Jun 2018 04:49) (99/66 - 117/73)  BP(mean): --  RR: 18 (02 Jun 2018 04:49) (18 - 18)  SpO2: 95% (02 Jun 2018 04:49) (93% - 100%)    CAPILLARY BLOOD GLUCOSE          I&O's Summary    31 May 2018 07:01  -  01 Jun 2018 07:00  --------------------------------------------------------  IN: 1860 mL / OUT: 0 mL / NET: 1860 mL    01 Jun 2018 07:01  -  02 Jun 2018 06:54  --------------------------------------------------------  IN: 1530 mL / OUT: 0 mL / NET: 1530 mL        PHYSICAL EXAM  GENERAL: NAD, alert, appears chronically ill   HEAD:  Atraumatic, temporal wasting  EYES: EOMI, PERRLA, conjunctiva and sclera clear  NECK: Supple, No JVD  CHEST/LUNG: Improved rales, normal resp effort and rate.   HEART: Irregular rate and rhythm; No murmurs, rubs, or gallops  ABDOMEN: Soft, Nontender, Nondistended; Bowel sounds present, + incontinent of black liquid stool  EXTREMITIES:  2+ Peripheral Pulses, No clubbing, cyanosis. 3+ pitting pretibial edema up to thighs and hips  NEURO/PSYCH: AAOx1 (self), aphasia, otherwise nonfocal  SKIN: No rashes or lesions. Lower extremity and sacral edema. Right groin with mild erythema- not warm to touch or swollen      LABS:                         10.2   18.1  )-----------( 129      ( 01 Jun 2018 12:34 )             32.8     CBC Full  -  ( 01 Jun 2018 12:34 )  WBC Count : 18.1 K/uL  Hemoglobin : 10.2 g/dL  Hematocrit : 32.8 %  Platelet Count - Automated : 129 K/uL  Mean Cell Volume : 95.7 fl  Mean Cell Hemoglobin : 29.7 pg  Mean Cell Hemoglobin Concentration : 31.0 gm/dL  Auto Neutrophil # : x  Auto Lymphocyte # : x  Auto Monocyte # : x  Auto Eosinophil # : x  Auto Basophil # : x  Auto Neutrophil % : x  Auto Lymphocyte % : x  Auto Monocyte % : x  Auto Eosinophil % : x  Auto Basophil % : x    06-01    139  |  115<H>  |  19  ----------------------------<  76  5.0   |  13<L>  |  0.70    Ca    7.3<L>      01 Jun 2018 11:12  Phos  2.3     06-01  Mg     1.9     06-01    TPro  5.4<L>  /  Alb  1.5<L>  /  TBili  0.3  /  DBili  x   /  AST  22  /  ALT  11  /  AlkPhos  125<H>  06-01    Creatinine Trend: 0.70<--, 0.78<--, 0.80<--, 0.92<--, 0.89<--, 0.84<--  LIVER FUNCTIONS - ( 01 Jun 2018 11:12 )  Alb: 1.5 g/dL / Pro: 5.4 g/dL / ALK PHOS: 125 U/L / ALT: 11 U/L / AST: 22 U/L / GGT: x               MICROBIOLOGY:      RADIOLOGY & ADDITIONAL TESTS:     CONSULTS: endocrinology, palliative care

## 2018-06-02 NOTE — PROGRESS NOTE ADULT - PROBLEM SELECTOR PLAN 6
Improved leukocytosis 28 on admission. Tachycardia on admission 2/2 afib with RVR. Unclear whether presence of SIRs is reactive 2/2 malignancy and blood loss vs infectious. Compressive atelectasis on CT, but cannot r/o pneumonia. Per son who is physician, pleural effusion is chronic and has been tapped in past. No other concerning signs for pneumonia at this time. Other possible source of infection was intraabdominal.  -s/p zosyn 5-d course to broadly cover infection, but no source was found.  -BCx no growth to date. C diff negative x 2. Pt is incontinent and edema makes straight cath difficult, but denies dysuria  -Procalcitonin 0.46 indeterminate  -Patient and family would like to pursue comfort measures (antibiotics were discontinued on 5/26)  -low suspicion for active infection at this time

## 2018-06-02 NOTE — PROGRESS NOTE ADULT - ATTENDING COMMENTS
still w/ melena, no GI intervention per family  ongoing goc discussions, dispo pending  cont current measures

## 2018-06-02 NOTE — PROGRESS NOTE ADULT - PROBLEM SELECTOR PLAN 4
Home regimen synthroid 75 mcg daily per pharmacy. Per daughter, pt reported she wasn't taking her medications the 2 weeks prior to discharge because she felt too sick.  -TSH significantly elevated 31.9. Tree T4 and T3 low  -T4 did not improve after being treated with synthroid x 1 week  -Per endocrine, switch to synthroid 64mcgIV daily then discharge on 88mcg PO. Will need repeat TSH in 4 weeks Home regimen synthroid 75 mcg daily. Per daughter, pt reported she wasn't taking her medications the 2 weeks prior to discharge because she felt too sick. c/b suspected poor absorption  -TSH significantly elevated 31.9. Tree T4 and T3 low  -T4 did not improve after being treated with synthroid x 1 week  -Per endocrine, switch to synthroid 64mcgIV daily then discharge on 88mcg PO. Will need repeat TSH in 4 weeks

## 2018-06-02 NOTE — PROGRESS NOTE ADULT - PROBLEM SELECTOR PLAN 5
Multifactorial and now likely 2/2 GI bleeding, decreased PO intake. RRT for hypotension 5/25  -improving and stabilized today after receiving IVF  -cont to monitor closely  -Per family goals of care discussion, no central line or pressors are to be used for hypotension even if unresponsive to fluids  -AM cortisol elevated 22, no concern for adrenal insufficiency Multifactorial 2/2 GIB, decreased PO intake. RRT for hypotension 5/25  -improving and stabilized. Continue D5 at 30cc/hr for hypoglycemia  -cont to monitor closely  -Per family goals of care discussion, no central line or pressors are to be used for hypotension even if unresponsive to fluids  -AM cortisol elevated 22, no concern for adrenal insufficiency

## 2018-06-02 NOTE — PROGRESS NOTE ADULT - PROBLEM SELECTOR PLAN 8
2/2 underlying sarcoma, duodenal masses with fistula, possible intraabdominal infection. s/p zosyn 5-day course  -Avoid opioids due to mental status 2/2 underlying sarcoma with fistula, possible intraabdominal infection. s/p zosyn 5-day course  -Morphine 0.5 q3h PRN

## 2018-06-02 NOTE — PROGRESS NOTE ADULT - PROBLEM SELECTOR PLAN 7
No current treatment this admission. Discussed care with pt's oncologist at [facility]. Possible RT but can be pursued as an outpatient.At this time no further treatment planned given poor performance status. Ongoing GOC discussions with HCP regarding dispo. She has been living with children prior to admission.  -patient is comfort measures  -family would like pt to go to [facility] after discharge, however she continues to decline and now again with active GI bleeding. Have asked palliative care to consult to both readdress GOC, explore possibilities of hospice, address symptoms. Will follow up. 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 No current treatment this admission. Discussed care with pt's oncologist at [facility]. Possible RT but can be pursued as an outpatient. At this time no further treatment planned given poor performance status. Ongoing GOC discussions with HCP regarding dispo. She has been living with children prior to admission.  -patient is comfort measures  -family would like pt to go to [facility] after discharge, however she continues to decline and now again with active GI bleeding. Have asked palliative care to consult to both readdress GOC, explore possibilities of hospice, address symptoms. Will follow up.

## 2018-06-02 NOTE — PROGRESS NOTE ADULT - PROBLEM SELECTOR PLAN 1
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 Likely contributing to hypotension.  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 Likely contributing to hypotension. Continues to have melena  -Patient is comfort care, no plan for scope or to reconsult GI  -Pantoprazole IV BID  -s/p 3u pRBC  -Monitor CBC every other day, maintain active T&S

## 2018-06-03 LAB
ALBUMIN SERPL ELPH-MCNC: 1.4 G/DL — LOW (ref 3.3–5)
ALP SERPL-CCNC: 116 U/L — SIGNIFICANT CHANGE UP (ref 40–120)
ALT FLD-CCNC: 10 U/L — SIGNIFICANT CHANGE UP (ref 10–45)
ANION GAP SERPL CALC-SCNC: 12 MMOL/L — SIGNIFICANT CHANGE UP (ref 5–17)
APTT BLD: 43.4 SEC — HIGH (ref 27.5–37.4)
AST SERPL-CCNC: 11 U/L — SIGNIFICANT CHANGE UP (ref 10–40)
BILIRUB SERPL-MCNC: 0.2 MG/DL — SIGNIFICANT CHANGE UP (ref 0.2–1.2)
BLD GP AB SCN SERPL QL: NEGATIVE — SIGNIFICANT CHANGE UP
BUN SERPL-MCNC: 21 MG/DL — SIGNIFICANT CHANGE UP (ref 7–23)
CALCIUM SERPL-MCNC: 7.1 MG/DL — LOW (ref 8.4–10.5)
CHLORIDE SERPL-SCNC: 113 MMOL/L — HIGH (ref 96–108)
CO2 SERPL-SCNC: 15 MMOL/L — LOW (ref 22–31)
CREAT SERPL-MCNC: 0.92 MG/DL — SIGNIFICANT CHANGE UP (ref 0.5–1.3)
GAS PNL BLDA: SIGNIFICANT CHANGE UP
GLUCOSE BLDC GLUCOMTR-MCNC: 271 MG/DL — HIGH (ref 70–99)
GLUCOSE BLDC GLUCOMTR-MCNC: 71 MG/DL — SIGNIFICANT CHANGE UP (ref 70–99)
GLUCOSE SERPL-MCNC: 281 MG/DL — HIGH (ref 70–99)
HCT VFR BLD CALC: 28.8 % — LOW (ref 34.5–45)
HGB BLD-MCNC: 9.1 G/DL — LOW (ref 11.5–15.5)
INR BLD: 1.36 RATIO — HIGH (ref 0.88–1.16)
MCHC RBC-ENTMCNC: 30.1 PG — SIGNIFICANT CHANGE UP (ref 27–34)
MCHC RBC-ENTMCNC: 31.6 GM/DL — LOW (ref 32–36)
MCV RBC AUTO: 95.4 FL — SIGNIFICANT CHANGE UP (ref 80–100)
PHOSPHATE SERPL-MCNC: 4 MG/DL — SIGNIFICANT CHANGE UP (ref 2.5–4.5)
PLATELET # BLD AUTO: 106 K/UL — LOW (ref 150–400)
POTASSIUM SERPL-MCNC: 3.8 MMOL/L — SIGNIFICANT CHANGE UP (ref 3.5–5.3)
POTASSIUM SERPL-SCNC: 3.8 MMOL/L — SIGNIFICANT CHANGE UP (ref 3.5–5.3)
PROT SERPL-MCNC: 4.6 G/DL — LOW (ref 6–8.3)
PROTHROM AB SERPL-ACNC: 14.8 SEC — HIGH (ref 9.8–12.7)
RBC # BLD: 3.02 M/UL — LOW (ref 3.8–5.2)
RBC # FLD: 22.8 % — HIGH (ref 10.3–14.5)
RH IG SCN BLD-IMP: POSITIVE — SIGNIFICANT CHANGE UP
SODIUM SERPL-SCNC: 140 MMOL/L — SIGNIFICANT CHANGE UP (ref 135–145)
WBC # BLD: 19.3 K/UL — HIGH (ref 3.8–10.5)
WBC # FLD AUTO: 19.3 K/UL — HIGH (ref 3.8–10.5)

## 2018-06-03 PROCEDURE — 99233 SBSQ HOSP IP/OBS HIGH 50: CPT | Mod: GC

## 2018-06-03 RX ORDER — SODIUM CHLORIDE 9 MG/ML
1000 INJECTION INTRAMUSCULAR; INTRAVENOUS; SUBCUTANEOUS ONCE
Qty: 0 | Refills: 0 | Status: COMPLETED | OUTPATIENT
Start: 2018-06-03 | End: 2018-06-03

## 2018-06-03 RX ORDER — ACETAMINOPHEN 500 MG
700 TABLET ORAL ONCE
Qty: 0 | Refills: 0 | Status: COMPLETED | OUTPATIENT
Start: 2018-06-03 | End: 2018-06-03

## 2018-06-03 RX ORDER — METOPROLOL TARTRATE 50 MG
12.5 TABLET ORAL
Qty: 0 | Refills: 0 | Status: DISCONTINUED | OUTPATIENT
Start: 2018-06-03 | End: 2018-06-05

## 2018-06-03 RX ADMIN — PANTOPRAZOLE SODIUM 40 MILLIGRAM(S): 20 TABLET, DELAYED RELEASE ORAL at 06:27

## 2018-06-03 RX ADMIN — SODIUM CHLORIDE 30 MILLILITER(S): 9 INJECTION, SOLUTION INTRAVENOUS at 16:37

## 2018-06-03 RX ADMIN — Medication 700 MILLIGRAM(S): at 18:56

## 2018-06-03 RX ADMIN — SODIUM CHLORIDE 2000 MILLILITER(S): 9 INJECTION INTRAMUSCULAR; INTRAVENOUS; SUBCUTANEOUS at 14:00

## 2018-06-03 RX ADMIN — PANTOPRAZOLE SODIUM 40 MILLIGRAM(S): 20 TABLET, DELAYED RELEASE ORAL at 18:26

## 2018-06-03 RX ADMIN — Medication 12.5 MILLIGRAM(S): at 18:27

## 2018-06-03 RX ADMIN — ONDANSETRON 4 MILLIGRAM(S): 8 TABLET, FILM COATED ORAL at 19:05

## 2018-06-03 RX ADMIN — Medication 64 MICROGRAM(S): at 06:28

## 2018-06-03 RX ADMIN — Medication 25 MILLIGRAM(S): at 06:28

## 2018-06-03 RX ADMIN — MORPHINE SULFATE 0.5 MILLIGRAM(S): 50 CAPSULE, EXTENDED RELEASE ORAL at 19:37

## 2018-06-03 RX ADMIN — Medication 280 MILLIGRAM(S): at 18:26

## 2018-06-03 RX ADMIN — Medication 0.12 MILLIGRAM(S): at 06:28

## 2018-06-03 NOTE — PROGRESS NOTE ADULT - PROBLEM SELECTOR PLAN 2
GOC readdressed with pt's children. Pt's son  Reymundo Rivera is the HCP (documentation in chart). Comfort measures  -Blood draws every other day. IVF, blood transfusions acceptable. No central lines, pressors, scope  -Palliative care consulted, halt transfer plans to rehab [facility] readdressed with pt's children. Pt's son Dr. Reymundo Rivera is the HCP (documentation in chart). Comfort measures  -Blood draws every other day. IVF, blood transfusions acceptable. No central lines, pressors, scope  -Palliative care consulted, HCP pursing transfer to [facility] for possible palliative RT. Discussed with son  Jann Nicole and daughter and they are open to hospice. Encouraged them to discuss with Reymundo and speak with palliative care on Monday.

## 2018-06-03 NOTE — PROGRESS NOTE ADULT - PROBLEM SELECTOR PLAN 5
Multifactorial 2/2 GIB, decreased PO intake. RRT for hypotension 5/25  -improving and stabilized. Continue D5 at 30cc/hr for hypoglycemia  -cont to monitor closely  -Per family goals of care discussion, no central line or pressors are to be used for hypotension even if unresponsive to fluids  -AM cortisol elevated 22, no concern for adrenal insufficiency Multifactorial 2/2 GIB, decreased PO intake. RRT for hypotension 5/25  -improving and stabilized. Continue D5 for hypoglycemia   -cont to monitor closely  -Per family goals of care discussion, no central line or pressors are to be used for hypotension even if unresponsive to fluids  -AM cortisol elevated 22, no concern for adrenal insufficiency

## 2018-06-03 NOTE — PROGRESS NOTE ADULT - PROBLEM SELECTOR PLAN 7
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 No current treatment this admission. Discussed care with pt's oncologist at [facility]. Possible RT but can be pursued as an outpatient. At this time no further treatment planned given poor performance status. Ongoing GOC discussions with HCP regarding dispo. She has been living with children prior to admission.  -patient is comfort measures  -family would like pt to go to [facility] after discharge, however she continues to decline and now again with active GI bleeding. Have asked palliative care to consult to both readdress GOC, explore possibilities of hospice, address symptoms. Will follow up.

## 2018-06-03 NOTE — CHART NOTE - NSCHARTNOTEFT_GEN_A_CORE
RRT called after pt was noted to be unresponsive following an episode of melena.   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 Pt is now mainly comfort care.  On arrival to bedside, pt was alert and conversing with staff at her baseline mental status (A&Ox1-2). VS: T 94.3 (though axillary), BP 110s/60s, other VS at baseline. FS 71, and 1 amp of D50 administered. Maintenance D5NS temporarily discontinued, and pt bolused with 1L NS. Labs, including CBC, CMP, ABG with lytes, coags, and type and screen, drawn. Primary team present at bedside throughout RRT and f/u plan discussed.  - F/u labs and transfuse pRBCs if needed  - C/w with IV protonix 40 mg bid  - Consider decreasing lopressor from 25 mg bid to 12.5 mg bid  - Continue GOC discussions with pt's family, given pt's guarded prognosis and current poor performance status and continued decline. Palliative following.     Priyanka Frances PGY-3  MAR  Spectra [phone]

## 2018-06-03 NOTE — PROGRESS NOTE ADULT - PROBLEM SELECTOR PLAN 4
Home regimen synthroid 75 mcg daily. Per daughter, pt reported she wasn't taking her medications the 2 weeks prior to discharge because she felt too sick. c/b suspected poor absorption  -TSH significantly elevated 31.9. Tree T4 and T3 low  -T4 did not improve after being treated with synthroid x 1 week  -Per endocrine, switch to synthroid 64mcgIV daily then discharge on 88mcg PO. Will need repeat TSH in 4 weeks

## 2018-06-03 NOTE — PROGRESS NOTE ADULT - PROBLEM SELECTOR PLAN 3
Pt reporting right foot pain. Right toes are erythematous and cool to touch. Right pedal pulse palpable and present on doppler. Likely underlying PVD.  -Pt refused ANDIRA study. HCP aware. Pt is high risk for GIB if anticoagulated  -continue statin

## 2018-06-03 NOTE — PROGRESS NOTE ADULT - ASSESSMENT
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

## 2018-06-03 NOTE — PROGRESS NOTE ADULT - SUBJECTIVE AND OBJECTIVE BOX
Internal Medicine Progress Note    Claire De Paz, PGY1  Internal Medicine, team 1  Pager [phone] / [phone]  After 7PM on weekdays and 12PM on weekends, please page #[phone]    Patient is a 73y old  Female who presents with a chief complaint of Lethargy, abd pain (23 May 2018 16:13)      SUBJECTIVE / OVERNIGHT EVENTS: JOHNNY overnight.      MEDICATIONS  (STANDING):  atorvastatin 40 milliGRAM(s) Oral at bedtime  calcium carbonate 500 mG (Tums) Chewable 1 Tablet(s) Chew once  dextrose 5% + sodium chloride 0.9%. 1000 milliLiter(s) (30 mL/Hr) IV Continuous <Continuous>  digoxin     Tablet 0.125 milliGRAM(s) Oral daily  levothyroxine Injectable 64 MICROGram(s) IV Push <User Schedule>  melatonin 3 milliGRAM(s) Oral at bedtime  metoprolol tartrate 25 milliGRAM(s) Oral two times a day  mirtazapine 7.5 milliGRAM(s) Oral daily  pantoprazole  Injectable 40 milliGRAM(s) IV Push two times a day    MEDICATIONS  (PRN):  acetaminophen   Tablet. 650 milliGRAM(s) Oral every 6 hours PRN Mild Pain (1 - 3)  morphine  - Injectable 0.5 milliGRAM(s) IV Push every 3 hours PRN mod-severe pain  ondansetron Injectable 4 milliGRAM(s) IV Push every 8 hours PRN Nausea and/or Vomiting      Vital Signs Last 24 Hrs  T(C): 36.1 (03 Jun 2018 06:18), Max: 36.4 (02 Jun 2018 11:34)  T(F): 97 (03 Jun 2018 06:18), Max: 97.6 (02 Jun 2018 11:34)  HR: 50 (03 Jun 2018 06:18) (50 - 73)  BP: 98/68 (03 Jun 2018 06:18) (95/68 - 98/68)  BP(mean): --  RR: 18 (03 Jun 2018 06:18) (18 - 18)  SpO2: 97% (03 Jun 2018 06:18) (95% - 97%)    CAPILLARY BLOOD GLUCOSE          I&O's Summary    02 Jun 2018 07:01  -  03 Jun 2018 07:00  --------------------------------------------------------  IN: 450 mL / OUT: 0 mL / NET: 450 mL        PHYSICAL EXAM  GENERAL: NAD, alert, appears chronically ill   HEAD:  Atraumatic, temporal wasting  EYES: EOMI, PERRLA, conjunctiva and sclera clear  NECK: Supple, No JVD  CHEST/LUNG: Improved rales, normal resp effort and rate.   HEART: Irregular rate and rhythm; No murmurs, rubs, or gallops  ABDOMEN: Soft, Nontender, Nondistended; Bowel sounds present, + incontinent of black liquid stool  EXTREMITIES:  2+ Peripheral Pulses, No clubbing, cyanosis. 3+ pitting pretibial edema up to thighs and hips  NEURO/PSYCH: AAOx1 (self), aphasia, otherwise nonfocal  SKIN: No rashes or lesions. Lower extremity and sacral edema. Right groin with mild erythema- not warm to touch or swollen      LABS:                        10.2   18.1  )-----------( 129      ( 01 Jun 2018 12:34 )             32.8     CBC Full  -  ( 01 Jun 2018 12:34 )  WBC Count : 18.1 K/uL  Hemoglobin : 10.2 g/dL  Hematocrit : 32.8 %  Platelet Count - Automated : 129 K/uL  Mean Cell Volume : 95.7 fl  Mean Cell Hemoglobin : 29.7 pg  Mean Cell Hemoglobin Concentration : 31.0 gm/dL  Auto Neutrophil # : x  Auto Lymphocyte # : x  Auto Monocyte # : x  Auto Eosinophil # : x  Auto Basophil # : x  Auto Neutrophil % : x  Auto Lymphocyte % : x  Auto Monocyte % : x  Auto Eosinophil % : x  Auto Basophil % : x    06-01    139  |  115<H>  |  19  ----------------------------<  76  5.0   |  13<L>  |  0.70    Ca    7.3<L>      01 Jun 2018 11:12  Phos  2.3     06-01  Mg     1.9     06-01    TPro  5.4<L>  /  Alb  1.5<L>  /  TBili  0.3  /  DBili  x   /  AST  22  /  ALT  11  /  AlkPhos  125<H>  06-01    Creatinine Trend: 0.70<--, 0.78<--, 0.80<--, 0.92<--, 0.89<--, 0.84<--  LIVER FUNCTIONS - ( 01 Jun 2018 11:12 )  Alb: 1.5 g/dL / Pro: 5.4 g/dL / ALK PHOS: 125 U/L / ALT: 11 U/L / AST: 22 U/L / GGT: x                   MICROBIOLOGY:        RADIOLOGY & ADDITIONAL TESTS:     CONSULTS: endocrinology, palliative care Internal Medicine Progress Note    Claire De Paz, PGY1  Internal Medicine, team 1  Pager [phone] / [phone]  After 7PM on weekdays and 12PM on weekends, please page #[phone]    Patient is a 73y old  Female who presents with a chief complaint of Lethargy, abd pain (23 May 2018 16:13)      SUBJECTIVE / OVERNIGHT EVENTS: JOHNNY overnight. RRT with few seconds of unresponsiveness, gave IVF, decreased lopressor to 12.5 BID. Lost PIV. Unable to obtain new PIV from nurses, US guided attempts by resident multiple attempts. PICC nurse was able to insert PIV. Per PICC nurse, pt would need to be sedated for PICC placement due to pt not staying still.    MEDICATIONS  (STANDING):  acetaminophen  IVPB. 700 milliGRAM(s) IV Intermittent once  atorvastatin 40 milliGRAM(s) Oral at bedtime  calcium carbonate 500 mG (Tums) Chewable 1 Tablet(s) Chew once  dextrose 5% + sodium chloride 0.9%. 1000 milliLiter(s) (30 mL/Hr) IV Continuous <Continuous>  digoxin     Tablet 0.125 milliGRAM(s) Oral daily  levothyroxine Injectable 64 MICROGram(s) IV Push <User Schedule>  melatonin 3 milliGRAM(s) Oral at bedtime  metoprolol tartrate 12.5 milliGRAM(s) Oral two times a day  mirtazapine 7.5 milliGRAM(s) Oral daily  pantoprazole  Injectable 40 milliGRAM(s) IV Push two times a day  sodium chloride 0.9% Bolus 1000 milliLiter(s) IV Bolus once    MEDICATIONS  (PRN):  acetaminophen   Tablet. 650 milliGRAM(s) Oral every 6 hours PRN Mild Pain (1 - 3)  morphine  - Injectable 0.5 milliGRAM(s) IV Push every 3 hours PRN mod-severe pain  ondansetron Injectable 4 milliGRAM(s) IV Push every 8 hours PRN Nausea and/or Vomiting      Vital Signs Last 24 Hrs  T(C): 35.9 (03 Jun 2018 12:05), Max: 36.4 (02 Jun 2018 20:12)  T(F): 96.7 (03 Jun 2018 12:05), Max: 97.5 (02 Jun 2018 20:12)  HR: 59 (03 Jun 2018 12:05) (50 - 69)  BP: 79/52 (03 Jun 2018 12:05) (79/52 - 98/68)  BP(mean): --  RR: 18 (03 Jun 2018 12:05) (18 - 18)  SpO2: 97% (03 Jun 2018 06:18) (95% - 97%)    CAPILLARY BLOOD GLUCOSE      POCT Blood Glucose.: 271 mg/dL (03 Jun 2018 09:01)  POCT Blood Glucose.: 71 mg/dL (03 Jun 2018 08:45)      I&O's Summary    02 Jun 2018 07:01  -  03 Jun 2018 07:00  --------------------------------------------------------  IN: 450 mL / OUT: 0 mL / NET: 450 mL    03 Jun 2018 07:01  -  03 Jun 2018 15:24  --------------------------------------------------------  IN: 220 mL / OUT: 0 mL / NET: 220 mL      PHYSICAL EXAM  GENERAL: NAD, alert, appears chronically ill   HEAD:  Atraumatic, temporal wasting  EYES: EOMI, PERRLA, conjunctiva and sclera clear  NECK: Supple, No JVD  CHEST/LUNG: Improved rales, normal resp effort and rate.   HEART: Irregular rate and rhythm; No murmurs, rubs, or gallops  ABDOMEN: Soft, Nontender, Nondistended; Bowel sounds present, + incontinent of black liquid stool  EXTREMITIES:  2+ Peripheral Pulses, No clubbing, cyanosis. 3+ pitting pretibial edema up to thighs and hips  NEURO/PSYCH: AAOx1 (self), aphasia, otherwise nonfocal  SKIN: No rashes or lesions. Lower extremity and sacral edema      LABS:                          9.1    19.3  )-----------( 106      ( 03 Jun 2018 09:17 )             28.8     CBC Full  -  ( 03 Jun 2018 09:17 )  WBC Count : 19.3 K/uL  Hemoglobin : 9.1 g/dL  Hematocrit : 28.8 %  Platelet Count - Automated : 106 K/uL  Mean Cell Volume : 95.4 fl  Mean Cell Hemoglobin : 30.1 pg  Mean Cell Hemoglobin Concentration : 31.6 gm/dL  Auto Neutrophil # : x  Auto Lymphocyte # : x  Auto Monocyte # : x  Auto Eosinophil # : x  Auto Basophil # : x  Auto Neutrophil % : x  Auto Lymphocyte % : x  Auto Monocyte % : x  Auto Eosinophil % : x  Auto Basophil % : x    06-03    140  |  113<H>  |  21  ----------------------------<  281<H>  3.8   |  15<L>  |  0.92    Ca    7.1<L>      03 Jun 2018 09:17  Phos  4.0     06-03    TPro  4.6<L>  /  Alb  1.4<L>  /  TBili  0.2  /  DBili  x   /  AST  11  /  ALT  10  /  AlkPhos  116  06-03    Creatinine Trend: 0.92<--, 0.70<--, 0.78<--, 0.80<--, 0.92<--, 0.89<--  LIVER FUNCTIONS - ( 03 Jun 2018 09:17 )  Alb: 1.4 g/dL / Pro: 4.6 g/dL / ALK PHOS: 116 U/L / ALT: 10 U/L / AST: 11 U/L / GGT: x           PT/INR - ( 03 Jun 2018 09:17 )   PT: 14.8 sec;   INR: 1.36 ratio         PTT - ( 03 Jun 2018 09:17 )  PTT:43.4 sec      ABG - ( 03 Jun 2018 09:22 )  pH, Arterial: 7.31  pH, Blood: x     /  pCO2: 34    /  pO2: 151   / HCO3: 17    / Base Excess: -8.0  /  SaO2: 99         MICROBIOLOGY:    RADIOLOGY & ADDITIONAL TESTS:     CONSULTS: endocrinology, palliative care

## 2018-06-03 NOTE — PROGRESS NOTE ADULT - PROBLEM SELECTOR PLAN 9
Home regimen lopressor 50mg BID  -continue lopressor 25 BID, monitor for hypotension  -continue dig 0.125mg daily, dig level wnl 5/29  -no AC 2/2 GIB. Monitor off tele Home regimen lopressor 50mg BID  -continue lopressor 12.5 BID, monitor for hypotension  -continue dig 0.125mg daily, dig level wnl 5/29  -no AC 2/2 GIB. Monitor off tele

## 2018-06-03 NOTE — PROGRESS NOTE ADULT - PROBLEM SELECTOR PLAN 8
2/2 underlying sarcoma with fistula, possible intraabdominal infection. s/p zosyn 5-day course  -Morphine 0.5 q3h PRN

## 2018-06-03 NOTE — PROGRESS NOTE ADULT - PROBLEM SELECTOR PLAN 1
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 Likely contributing to hypotension. Continues to have melena  -Patient is comfort care, no plan for scope or to reconsult GI  -Pantoprazole IV BID  -s/p 3u pRBC  -Monitor CBC every other day, maintain active T&S

## 2018-06-03 NOTE — PROGRESS NOTE ADULT - ATTENDING COMMENTS
RRT this am after a melenic stool  lost iv access  family arranging for transfer to [facility], will f/u with their transfer center

## 2018-06-04 LAB
APPEARANCE UR: ABNORMAL
BILIRUB UR-MCNC: NEGATIVE — SIGNIFICANT CHANGE UP
COLOR SPEC: YELLOW — SIGNIFICANT CHANGE UP
DIFF PNL FLD: NEGATIVE — SIGNIFICANT CHANGE UP
GLUCOSE UR QL: NEGATIVE — SIGNIFICANT CHANGE UP
KETONES UR-MCNC: NEGATIVE — SIGNIFICANT CHANGE UP
LEUKOCYTE ESTERASE UR-ACNC: ABNORMAL
NITRITE UR-MCNC: NEGATIVE — SIGNIFICANT CHANGE UP
PH UR: 6 — SIGNIFICANT CHANGE UP (ref 5–8)
PROT UR-MCNC: 100 MG/DL
SP GR SPEC: 1.02 — SIGNIFICANT CHANGE UP (ref 1.01–1.02)
UROBILINOGEN FLD QL: NEGATIVE — SIGNIFICANT CHANGE UP

## 2018-06-04 PROCEDURE — 99233 SBSQ HOSP IP/OBS HIGH 50: CPT | Mod: GC

## 2018-06-04 PROCEDURE — 99233 SBSQ HOSP IP/OBS HIGH 50: CPT

## 2018-06-04 RX ORDER — NYSTATIN CREAM 100000 [USP'U]/G
1 CREAM TOPICAL
Qty: 0 | Refills: 0 | Status: DISCONTINUED | OUTPATIENT
Start: 2018-06-04 | End: 2018-06-09

## 2018-06-04 RX ORDER — SODIUM CHLORIDE 9 MG/ML
500 INJECTION, SOLUTION INTRAVENOUS ONCE
Qty: 0 | Refills: 0 | Status: COMPLETED | OUTPATIENT
Start: 2018-06-04 | End: 2018-06-04

## 2018-06-04 RX ORDER — SODIUM CHLORIDE 9 MG/ML
1000 INJECTION, SOLUTION INTRAVENOUS
Qty: 0 | Refills: 0 | Status: DISCONTINUED | OUTPATIENT
Start: 2018-06-04 | End: 2018-06-06

## 2018-06-04 RX ADMIN — PANTOPRAZOLE SODIUM 40 MILLIGRAM(S): 20 TABLET, DELAYED RELEASE ORAL at 06:32

## 2018-06-04 RX ADMIN — ATORVASTATIN CALCIUM 40 MILLIGRAM(S): 80 TABLET, FILM COATED ORAL at 01:10

## 2018-06-04 RX ADMIN — Medication 0.12 MILLIGRAM(S): at 07:55

## 2018-06-04 RX ADMIN — Medication 64 MICROGRAM(S): at 07:55

## 2018-06-04 RX ADMIN — MIRTAZAPINE 7.5 MILLIGRAM(S): 45 TABLET, ORALLY DISINTEGRATING ORAL at 01:12

## 2018-06-04 RX ADMIN — SODIUM CHLORIDE 250 MILLILITER(S): 9 INJECTION, SOLUTION INTRAVENOUS at 23:00

## 2018-06-04 RX ADMIN — NYSTATIN CREAM 1 APPLICATION(S): 100000 CREAM TOPICAL at 17:46

## 2018-06-04 RX ADMIN — Medication 12.5 MILLIGRAM(S): at 17:43

## 2018-06-04 RX ADMIN — SODIUM CHLORIDE 30 MILLILITER(S): 9 INJECTION, SOLUTION INTRAVENOUS at 10:51

## 2018-06-04 RX ADMIN — Medication 3 MILLIGRAM(S): at 01:13

## 2018-06-04 RX ADMIN — PANTOPRAZOLE SODIUM 40 MILLIGRAM(S): 20 TABLET, DELAYED RELEASE ORAL at 17:43

## 2018-06-04 NOTE — CHART NOTE - NSCHARTNOTEFT_GEN_A_CORE
Source: Patient [ ]    Family [ X]  Pt's daughter at bedside   other [ X] medical record, RN     Diet : Mechanical Soft, Ensure Enlive- 2 per day (provides additional 700cal, 40gm protein)     Pt seen for malnutrition follow up. Medical chart reviewed/events noted. GOC discussion in progress, patient and family would like to pursue comfort measures. Per daughter, pt continues to have poor PO intake, not eating anything. Obtained preferences, made pt's family aware RD remains available as needed.      PO intake:  < 50% [X ] 50-75% [ ]   % [ ]  other :     Source for PO intake [ ] Patient [X ] family [ ] chart [ ] staff [ ] other        Current Weight: (6/1) 106.4 pounds , (6/3) 108.2 pounds   % Weight Change    Pertinent Medications: MEDICATIONS  (STANDING):  atorvastatin 40 milliGRAM(s) Oral at bedtime  calcium carbonate 500 mG (Tums) Chewable 1 Tablet(s) Chew once  dextrose 5% + lactated ringers. 1000 milliLiter(s) (30 mL/Hr) IV Continuous <Continuous>  digoxin     Tablet 0.125 milliGRAM(s) Oral daily  levothyroxine Injectable 64 MICROGram(s) IV Push <User Schedule>  melatonin 3 milliGRAM(s) Oral at bedtime  metoprolol tartrate 12.5 milliGRAM(s) Oral two times a day  mirtazapine 7.5 milliGRAM(s) Oral daily  pantoprazole  Injectable 40 milliGRAM(s) IV Push two times a day    MEDICATIONS  (PRN):  acetaminophen   Tablet. 650 milliGRAM(s) Oral every 6 hours PRN Mild Pain (1 - 3)  morphine  - Injectable 0.5 milliGRAM(s) IV Push every 3 hours PRN mod-severe pain  ondansetron Injectable 4 milliGRAM(s) IV Push every 8 hours PRN Nausea and/or Vomiting    Pertinent Labs:  06-03 Na140 mmol/L Glu 281 mg/dL<H> K+ 3.8 mmol/L Cr  0.92 mg/dL BUN 21 mg/dL 06-03 Phos 4.0 mg/dL 06-03 Alb 1.4 g/dL<L>      Skin: +3 generalized edema, No pressure ulcers.     Estimated Needs:   [ X] no change since previous assessment  [ ] recalculated:       Previous Nutrition Diagnosis:    [X ] Malnutrition, severe         Nutrition Diagnosis is [X ] ongoing  [ ] resolved [ ] not applicable          New Nutrition Diagnosis: [X ] not applicable      Recommend    1) Continue current diet as tolerated  2) Continue to provide Ensure Enlive- 2 per day (provides additional 700cal, 40gm protein)  3) RD remains available as needed         Monitoring and Evaluation:     1.Continue to monitor weight, po intake, labs, and GI tolerance        Yelitza Valdez RD pager #[phone]

## 2018-06-04 NOTE — PROGRESS NOTE ADULT - SUBJECTIVE AND OBJECTIVE BOX
Internal Medicine Progress Note    Claire De Paz, PGY1  Internal Medicine, team 1  Pager [phone] / [phone]  After 7PM on weekdays and 12PM on weekends, please page #[phone]    Patient is a 73y old  Female who presents with a chief complaint of Lethargy, abd pain (23 May 2018 16:13)      SUBJECTIVE / OVERNIGHT EVENTS: RRT yesterday for few seconds of unresponsiveness. PICC nurse placed PIV.    MEDICATIONS  (STANDING):  atorvastatin 40 milliGRAM(s) Oral at bedtime  calcium carbonate 500 mG (Tums) Chewable 1 Tablet(s) Chew once  dextrose 5% + sodium chloride 0.9%. 1000 milliLiter(s) (30 mL/Hr) IV Continuous <Continuous>  digoxin     Tablet 0.125 milliGRAM(s) Oral daily  levothyroxine Injectable 64 MICROGram(s) IV Push <User Schedule>  melatonin 3 milliGRAM(s) Oral at bedtime  metoprolol tartrate 12.5 milliGRAM(s) Oral two times a day  mirtazapine 7.5 milliGRAM(s) Oral daily  pantoprazole  Injectable 40 milliGRAM(s) IV Push two times a day    MEDICATIONS  (PRN):  acetaminophen   Tablet. 650 milliGRAM(s) Oral every 6 hours PRN Mild Pain (1 - 3)  morphine  - Injectable 0.5 milliGRAM(s) IV Push every 3 hours PRN mod-severe pain  ondansetron Injectable 4 milliGRAM(s) IV Push every 8 hours PRN Nausea and/or Vomiting      Vital Signs Last 24 Hrs  T(C): 34.8 (04 Jun 2018 06:18), Max: 36.2 (03 Jun 2018 15:25)  T(F): 94.7 (04 Jun 2018 06:18), Max: 97.2 (03 Jun 2018 15:25)  HR: 83 (04 Jun 2018 06:18) (58 - 88)  BP: 106/68 (04 Jun 2018 06:18) (57/43 - 108/73)  BP(mean): --  RR: 18 (04 Jun 2018 06:18) (18 - 18)  SpO2: 95% (04 Jun 2018 06:18) (95% - 97%)    CAPILLARY BLOOD GLUCOSE      POCT Blood Glucose.: 271 mg/dL (03 Jun 2018 09:01)  POCT Blood Glucose.: 71 mg/dL (03 Jun 2018 08:45)      I&O's Summary    03 Jun 2018 07:01  -  04 Jun 2018 07:00  --------------------------------------------------------  IN: 580 mL / OUT: 0 mL / NET: 580 mL      PHYSICAL EXAM  GENERAL: NAD, alert, appears chronically ill   HEAD:  Atraumatic, temporal wasting  EYES: EOMI, PERRLA, conjunctiva and sclera clear  NECK: Supple, No JVD  CHEST/LUNG: Improved rales, normal resp effort and rate.   HEART: Irregular rate and rhythm; No murmurs, rubs, or gallops  ABDOMEN: Soft, Nontender, Nondistended; Bowel sounds present, + incontinent of black liquid stool  EXTREMITIES:  2+ Peripheral Pulses, No clubbing, cyanosis. 3+ pitting pretibial edema up to thighs and hips  NEURO/PSYCH: AAOx1 (self), aphasia, otherwise nonfocal  SKIN: No rashes or lesions. Lower extremity and sacral edema      LABS:                        9.1    19.3  )-----------( 106      ( 03 Jun 2018 09:17 )             28.8     CBC Full  -  ( 03 Jun 2018 09:17 )  WBC Count : 19.3 K/uL  Hemoglobin : 9.1 g/dL  Hematocrit : 28.8 %  Platelet Count - Automated : 106 K/uL  Mean Cell Volume : 95.4 fl  Mean Cell Hemoglobin : 30.1 pg  Mean Cell Hemoglobin Concentration : 31.6 gm/dL  Auto Neutrophil # : x  Auto Lymphocyte # : x  Auto Monocyte # : x  Auto Eosinophil # : x  Auto Basophil # : x  Auto Neutrophil % : x  Auto Lymphocyte % : x  Auto Monocyte % : x  Auto Eosinophil % : x  Auto Basophil % : x    06-03    140  |  113<H>  |  21  ----------------------------<  281<H>  3.8   |  15<L>  |  0.92    Ca    7.1<L>      03 Jun 2018 09:17  Phos  4.0     06-03    TPro  4.6<L>  /  Alb  1.4<L>  /  TBili  0.2  /  DBili  x   /  AST  11  /  ALT  10  /  AlkPhos  116  06-03    Creatinine Trend: 0.92<--, 0.70<--, 0.78<--, 0.80<--, 0.92<--, 0.89<--  LIVER FUNCTIONS - ( 03 Jun 2018 09:17 )  Alb: 1.4 g/dL / Pro: 4.6 g/dL / ALK PHOS: 116 U/L / ALT: 10 U/L / AST: 11 U/L / GGT: x           PT/INR - ( 03 Jun 2018 09:17 )   PT: 14.8 sec;   INR: 1.36 ratio       PTT - ( 03 Jun 2018 09:17 )  PTT:43.4 sec    ABG - ( 03 Jun 2018 09:22 )  pH, Arterial: 7.31  pH, Blood: x     /  pCO2: 34    /  pO2: 151   / HCO3: 17    / Base Excess: -8.0  /  SaO2: 99          MICROBIOLOGY:    RADIOLOGY & ADDITIONAL TESTS:     CONSULTS: endocrinology, palliative care Internal Medicine Progress Note    Claire De Paz, PGY1  Internal Medicine, team 1  Pager [phone] / [phone]  After 7PM on weekdays and 12PM on weekends, please page #[phone]    Patient is a 73y old  Female who presents with a chief complaint of Lethargy, abd pain (23 May 2018 16:13)      SUBJECTIVE / OVERNIGHT EVENTS: RRT yesterday for few seconds of unresponsiveness. PICC nurse placed PIV yesterday. Last BM yesterday.    MEDICATIONS  (STANDING):  atorvastatin 40 milliGRAM(s) Oral at bedtime  calcium carbonate 500 mG (Tums) Chewable 1 Tablet(s) Chew once  dextrose 5% + lactated ringers. 1000 milliLiter(s) (30 mL/Hr) IV Continuous <Continuous>  digoxin     Tablet 0.125 milliGRAM(s) Oral daily  levothyroxine Injectable 64 MICROGram(s) IV Push <User Schedule>  melatonin 3 milliGRAM(s) Oral at bedtime  metoprolol tartrate 12.5 milliGRAM(s) Oral two times a day  mirtazapine 7.5 milliGRAM(s) Oral daily  pantoprazole  Injectable 40 milliGRAM(s) IV Push two times a day    MEDICATIONS  (PRN):  acetaminophen   Tablet. 650 milliGRAM(s) Oral every 6 hours PRN Mild Pain (1 - 3)  morphine  - Injectable 0.5 milliGRAM(s) IV Push every 3 hours PRN mod-severe pain  ondansetron Injectable 4 milliGRAM(s) IV Push every 8 hours PRN Nausea and/or Vomiting      Vital Signs Last 24 Hrs  T(C): 35.3 (04 Jun 2018 09:19), Max: 36.2 (03 Jun 2018 15:25)  T(F): 95.6 (04 Jun 2018 09:19), Max: 97.2 (03 Jun 2018 15:25)  HR: 85 (04 Jun 2018 09:19) (58 - 88)  BP: 92/62 (04 Jun 2018 09:19) (57/43 - 108/73)  BP(mean): --  RR: 18 (04 Jun 2018 09:19) (18 - 18)  SpO2: 100% (04 Jun 2018 09:19) (95% - 100%)    CAPILLARY BLOOD GLUCOSE          I&O's Summary    03 Jun 2018 07:01  -  04 Jun 2018 07:00  --------------------------------------------------------  IN: 580 mL / OUT: 0 mL / NET: 580 mL        PHYSICAL EXAM  GENERAL: NAD, alert, appears chronically ill   HEAD:  Atraumatic, temporal wasting  EYES: EOMI, PERRLA, conjunctiva and sclera clear  NECK: Supple, No JVD  CHEST/LUNG: Improved rales, normal resp effort and rate.   HEART: Irregular rate and rhythm; No murmurs, rubs, or gallops  ABDOMEN: Soft, Nontender, Nondistended; Bowel sounds present, + incontinent of black liquid stool  EXTREMITIES:  2+ Peripheral Pulses, No clubbing, cyanosis. 3+ pitting pretibial edema up to thighs and hips  NEURO/PSYCH: AAOx1 (self), aphasia, otherwise nonfocal  SKIN: No rashes or lesions. Lower extremity and sacral edema      LABS:                        9.1    19.3  )-----------( 106      ( 03 Jun 2018 09:17 )             28.8     CBC Full  -  ( 03 Jun 2018 09:17 )  WBC Count : 19.3 K/uL  Hemoglobin : 9.1 g/dL  Hematocrit : 28.8 %  Platelet Count - Automated : 106 K/uL  Mean Cell Volume : 95.4 fl  Mean Cell Hemoglobin : 30.1 pg  Mean Cell Hemoglobin Concentration : 31.6 gm/dL  Auto Neutrophil # : x  Auto Lymphocyte # : x  Auto Monocyte # : x  Auto Eosinophil # : x  Auto Basophil # : x  Auto Neutrophil % : x  Auto Lymphocyte % : x  Auto Monocyte % : x  Auto Eosinophil % : x  Auto Basophil % : x    06-03    140  |  113<H>  |  21  ----------------------------<  281<H>  3.8   |  15<L>  |  0.92    Ca    7.1<L>      03 Jun 2018 09:17  Phos  4.0     06-03    TPro  4.6<L>  /  Alb  1.4<L>  /  TBili  0.2  /  DBili  x   /  AST  11  /  ALT  10  /  AlkPhos  116  06-03    Creatinine Trend: 0.92<--, 0.70<--, 0.78<--, 0.80<--, 0.92<--, 0.89<--  LIVER FUNCTIONS - ( 03 Jun 2018 09:17 )  Alb: 1.4 g/dL / Pro: 4.6 g/dL / ALK PHOS: 116 U/L / ALT: 10 U/L / AST: 11 U/L / GGT: x           PT/INR - ( 03 Jun 2018 09:17 )   PT: 14.8 sec;   INR: 1.36 ratio       PTT - ( 03 Jun 2018 09:17 )  PTT:43.4 sec    ABG - ( 03 Jun 2018 09:22 )  pH, Arterial: 7.31  pH, Blood: x     /  pCO2: 34    /  pO2: 151   / HCO3: 17    / Base Excess: -8.0  /  SaO2: 99          MICROBIOLOGY:    RADIOLOGY & ADDITIONAL TESTS:     CONSULTS: endocrinology, palliative care Internal Medicine Progress Note    Claire De Paz, PGY1  Internal Medicine, team 1  Pager [phone] / [phone]  After 7PM on weekdays and 12PM on weekends, please page #[phone]    Patient is a 73y old  Female who presents with a chief complaint of Lethargy, abd pain (23 May 2018 16:13)      SUBJECTIVE / OVERNIGHT EVENTS: RRT yesterday for few seconds of unresponsiveness. PICC nurse placed PIV yesterday. Last BM yesterday. Yesterday, spoke with pt's son Dr. Jann Rivera and daughter Ashley and they were interested in inpatient hospice. Today spoke with son Dr. Reymundo Rivera (HCP) who spoke with his siblings. 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 Received 2 doses of Nivolumab and Nektar.    MEDICATIONS  (STANDING):  atorvastatin 40 milliGRAM(s) Oral at bedtime  calcium carbonate 500 mG (Tums) Chewable 1 Tablet(s) Chew once  dextrose 5% + lactated ringers. 1000 milliLiter(s) (30 mL/Hr) IV Continuous <Continuous>  digoxin     Tablet 0.125 milliGRAM(s) Oral daily  levothyroxine Injectable 64 MICROGram(s) IV Push <User Schedule>  melatonin 3 milliGRAM(s) Oral at bedtime  metoprolol tartrate 12.5 milliGRAM(s) Oral two times a day  mirtazapine 7.5 milliGRAM(s) Oral daily  pantoprazole  Injectable 40 milliGRAM(s) IV Push two times a day    MEDICATIONS  (PRN):  acetaminophen   Tablet. 650 milliGRAM(s) Oral every 6 hours PRN Mild Pain (1 - 3)  morphine  - Injectable 0.5 milliGRAM(s) IV Push every 3 hours PRN mod-severe pain  ondansetron Injectable 4 milliGRAM(s) IV Push every 8 hours PRN Nausea and/or Vomiting      Vital Signs Last 24 Hrs  T(C): 35.3 (04 Jun 2018 09:19), Max: 36.2 (03 Jun 2018 15:25)  T(F): 95.6 (04 Jun 2018 09:19), Max: 97.2 (03 Jun 2018 15:25)  HR: 85 (04 Jun 2018 09:19) (58 - 88)  BP: 92/62 (04 Jun 2018 09:19) (57/43 - 108/73)  BP(mean): --  RR: 18 (04 Jun 2018 09:19) (18 - 18)  SpO2: 100% (04 Jun 2018 09:19) (95% - 100%)    CAPILLARY BLOOD GLUCOSE        I&O's Summary    03 Jun 2018 07:01  -  04 Jun 2018 07:00  --------------------------------------------------------  IN: 580 mL / OUT: 0 mL / NET: 580 mL        PHYSICAL EXAM  GENERAL: NAD, alert, appears chronically ill   HEAD:  Atraumatic, temporal wasting  EYES: EOMI, PERRLA, conjunctiva and sclera clear  NECK: Supple, No JVD  CHEST/LUNG: Improved rales, normal resp effort and rate.   HEART: Irregular rate and rhythm; No murmurs, rubs, or gallops  ABDOMEN: Soft, Nontender, Nondistended; Bowel sounds present, + incontinent of black liquid stool  EXTREMITIES:  2+ Peripheral Pulses, No clubbing, cyanosis. 2+ pitting pretibial edema up to thighs and hips  NEURO/PSYCH: AAOx1 (self), aphasia, otherwise nonfocal  SKIN: Erythematous, swollen vulva and inguinal region. Lower extremity and sacral edema      LABS:                        9.1    19.3  )-----------( 106      ( 03 Jun 2018 09:17 )             28.8     CBC Full  -  ( 03 Jun 2018 09:17 )  WBC Count : 19.3 K/uL  Hemoglobin : 9.1 g/dL  Hematocrit : 28.8 %  Platelet Count - Automated : 106 K/uL  Mean Cell Volume : 95.4 fl  Mean Cell Hemoglobin : 30.1 pg  Mean Cell Hemoglobin Concentration : 31.6 gm/dL  Auto Neutrophil # : x  Auto Lymphocyte # : x  Auto Monocyte # : x  Auto Eosinophil # : x  Auto Basophil # : x  Auto Neutrophil % : x  Auto Lymphocyte % : x  Auto Monocyte % : x  Auto Eosinophil % : x  Auto Basophil % : x    06-03    140  |  113<H>  |  21  ----------------------------<  281<H>  3.8   |  15<L>  |  0.92    Ca    7.1<L>      03 Jun 2018 09:17  Phos  4.0     06-03    TPro  4.6<L>  /  Alb  1.4<L>  /  TBili  0.2  /  DBili  x   /  AST  11  /  ALT  10  /  AlkPhos  116  06-03    Creatinine Trend: 0.92<--, 0.70<--, 0.78<--, 0.80<--, 0.92<--, 0.89<--  LIVER FUNCTIONS - ( 03 Jun 2018 09:17 )  Alb: 1.4 g/dL / Pro: 4.6 g/dL / ALK PHOS: 116 U/L / ALT: 10 U/L / AST: 11 U/L / GGT: x           PT/INR - ( 03 Jun 2018 09:17 )   PT: 14.8 sec;   INR: 1.36 ratio       PTT - ( 03 Jun 2018 09:17 )  PTT:43.4 sec    ABG - ( 03 Jun 2018 09:22 )  pH, Arterial: 7.31  pH, Blood: x     /  pCO2: 34    /  pO2: 151   / HCO3: 17    / Base Excess: -8.0  /  SaO2: 99          MICROBIOLOGY:    RADIOLOGY & ADDITIONAL TESTS:     CONSULTS: endocrinology, palliative care

## 2018-06-04 NOTE — PROGRESS NOTE ADULT - PROBLEM SELECTOR PLAN 7
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 No current treatment this admission. Discussed care with pt's oncologist at [facility] [place]. Possible RT but can be pursued as an outpatient. At this time no further treatment planned given poor performance status. Ongoing GOC discussions with HCP regarding dispo. She has been living with children prior to admission.  -patient is comfort measures  -family would like pt to go to [facility] after discharge, however she continues to decline and now again with active GI bleeding. Have asked palliative care to consult to both readdress GOC, explore possibilities of hospice, address symptoms. Will follow up. 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 No current treatment this admission. Discussed care with pt's oncologist at [facility] [place]. Possible RT but can be pursued as an outpatient. At this time no further treatment planned given poor performance status. Ongoing C discussions with HCP regarding dispo. She has been living with children prior to admission.  -patient is comfort measures  -Transfer to [facility] for palliative RT

## 2018-06-04 NOTE — PROGRESS NOTE ADULT - PROBLEM SELECTOR PLAN 2
[facility] readdressed with pt's children. Pt's son Dr. Reymundo Rivera is the HCP (documentation in chart). Comfort measures  -Blood draws every other day. IVF, blood transfusions acceptable. No central lines, pressors, scope  -Palliative care consulted, HCP pursing transfer to [facility] [place] for possible palliative RT. Discussed with son  Jann Nicole and daughter and they are open to hospice. Encouraged them to discuss with Reymundo and speak with palliative care on Monday. [facility] readdressed with pt's children. Pt's son  Reymundo Nicole is the HCP (documentation in chart). Comfort measures  -Blood draws every other day. IVF, blood transfusions acceptable. No central lines, pressors, scope  -Palliative care consulted, HCP pursing transfer to [facility] [place] for possible palliative RT. Discussed with son  Jann Nicole and daughter and they are open to inpatient hospice.  -Dr. Grullon to reach out to HCP today [facility] readdressed with pt's children. Pt's son  Reymundo Nicole is the HCP (documentation in chart). Comfort measures  -Blood draws every other day. IVF, blood transfusions acceptable. No central lines, pressors, scope  -Palliative care consulted, HCP pursing transfer to [facility] [place] for possible palliative RT. Discussed with son  Jann Rivera and daughter and they are open to inpatient hospice.  -Hypoglycemia from poor PO intake, switch D5 in NS to D5 in LR due to hyperchloremic metabolic acidosis  -Dr. Grullon to reach out to HCP today

## 2018-06-04 NOTE — PROGRESS NOTE ADULT - PROBLEM SELECTOR PLAN 1
[place] [facility] [place] at this time. Daughter stated interest in opinion from rad-onc here, but will discuss further with son Reymundo as he is HCP.

## 2018-06-04 NOTE — PROGRESS NOTE ADULT - PROBLEM SELECTOR PLAN 5
Will plan to discuss with son later today regarding [place] [facility] Question raised per medical team notes regarding hospice; will discuss further with son, but discharge with hospice would likely be arranged at [facility] if [place] occurs.

## 2018-06-04 NOTE — PROGRESS NOTE ADULT - PROBLEM SELECTOR PLAN 5
Multifactorial 2/2 GIB, decreased PO intake. RRT for hypotension 5/25  -improving and stabilized. Continue D5 for hypoglycemia   -cont to monitor closely  -Per family goals of care discussion, no central line or pressors are to be used for hypotension even if unresponsive to fluids  -AM cortisol elevated 22, no concern for adrenal insufficiency

## 2018-06-04 NOTE — PROGRESS NOTE ADULT - PROBLEM SELECTOR PLAN 9
Home regimen lopressor 50mg BID  -continue lopressor 12.5 BID, monitor for hypotension  -continue dig 0.125mg daily, dig level wnl 5/29  -no AC 2/2 GIB. Monitor off tele

## 2018-06-04 NOTE — PROGRESS NOTE ADULT - PROBLEM SELECTOR PLAN 3
Pt reporting right foot pain. Right toes are erythematous and cool to touch. Right pedal pulse palpable and present on doppler. Likely underlying PVD.  -Pt refused NADIRA study. HCP aware. Pt is high risk for GIB if anticoagulated  -continue statin

## 2018-06-04 NOTE — PROGRESS NOTE ADULT - ATTENDING COMMENTS
family still wavering, inpt hospice vs transfer to [facility]. will f/u with them  change ivfs to d5 LR  continue comfort measures

## 2018-06-04 NOTE — PROGRESS NOTE ADULT - PROBLEM SELECTOR PLAN 6
Improved leukocytosis 28 on admission. Tachycardia on admission 2/2 afib with RVR. Unclear whether presence of SIRs is reactive 2/2 malignancy and blood loss vs infectious. Compressive atelectasis on CT, but cannot r/o pneumonia. Per son who is physician, pleural effusion is chronic and has been tapped in past. No other concerning signs for pneumonia at this time. Other possible source of infection was intraabdominal.  -s/p zosyn 5-d course to broadly cover infection, but no source was found.  -BCx no growth to date. C diff negative x 2. Pt is incontinent and edema makes straight cath difficult, but denies dysuria  -Procalcitonin 0.46 indeterminate  -Patient and family would like to pursue comfort measures (antibiotics were discontinued on 5/26)  -low suspicion for active infection at this time Improved leukocytosis 28 on admission. Tachycardia on admission 2/2 afib with RVR. Unclear whether presence of SIRs is reactive 2/2 malignancy and blood loss vs infectious. Compressive atelectasis on CT, but cannot r/o pneumonia. Per son who is physician, pleural effusion is chronic and has been tapped in past. No other concerning signs for pneumonia at this time. Other possible source of infection was intraabdominal.  -s/p zosyn 5-d course to broadly cover infection, but no source was found.  -BCx no growth to date. C diff negative x 2. Pt is incontinent and edema makes straight cath difficult, but denies dysuria  -Procalcitonin 0.46 indeterminate  -Patient and family would like to pursue comfort measures (antibiotics were discontinued on 5/26)  -low suspicion for active infection at this time  - f/u repeat procalcitonin

## 2018-06-04 NOTE — PROGRESS NOTE ADULT - PROBLEM SELECTOR PLAN 4
Currently appears comfortable, used morphine 0.5mg x 1, continue with current dosing at morphine 0.1mg IV Q3 PRN.

## 2018-06-04 NOTE — PROCEDURE NOTE - ADDITIONAL PROCEDURE DETAILS
Called by the primary team for straight catheter   Glans was prepped and cleansed with betadine solution, and a 16 fr coude catheter was inserted in sterile manner, with clear urine output.  Patient tolerated procedure well.  RN present at bedside during procedure.

## 2018-06-04 NOTE — PROGRESS NOTE ADULT - PROBLEM SELECTOR PLAN 3
From eroding duodenal mass, no plans for invasive intervention, but RT is being considered if offered

## 2018-06-04 NOTE — PROGRESS NOTE ADULT - SUBJECTIVE AND OBJECTIVE BOX
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 Found with acute-on-chronic anemia likely from underlying malignancy, with AF with RVR. No plans for scoping to evaluate due to goals from family. Also with R foot pain, likely with PVD but patient did not want further studies. Also c/b hypotension, likely from multifactorial cause (GI bleed, decreased PO), with no plans for pressors based on goals. s/p zosyn for possible PNA. Per son, she was walking about a month ago, but now is more bedbound, with progressively worsening appetite.     INTERVAL EVENTS: patient appears comfortable, daughter at bedside    ADVANCE DIRECTIVES:    DNR [X ] YES [ ] NO                            [X ] Completed  MOLST  [ ] YES [ ] NO                      [ ] Completed  Health Care Proxy [X ] YES  [ ] NO   [X ] Completed  Living Will  [ ] YES [ ] NO             [ ] Surrogate  [X ] HCP  [ ] Guardian:    son  Reymundo Rivera ()     ALLERGIES:   Allergies    No Known Allergies    Intolerances    MEDICATIONS:              MEDICATIONS  (STANDING):  atorvastatin 40 milliGRAM(s) Oral at bedtime  calcium carbonate 500 mG (Tums) Chewable 1 Tablet(s) Chew once  dextrose 5% + lactated ringers. 1000 milliLiter(s) (30 mL/Hr) IV Continuous <Continuous>  digoxin     Tablet 0.125 milliGRAM(s) Oral daily  levothyroxine Injectable 64 MICROGram(s) IV Push <User Schedule>  melatonin 3 milliGRAM(s) Oral at bedtime  metoprolol tartrate 12.5 milliGRAM(s) Oral two times a day  mirtazapine 7.5 milliGRAM(s) Oral daily  nystatin Powder 1 Application(s) Topical two times a day  pantoprazole  Injectable 40 milliGRAM(s) IV Push two times a day    MEDICATIONS  (PRN):  acetaminophen   Tablet. 650 milliGRAM(s) Oral every 6 hours PRN Mild Pain (1 - 3)  morphine  - Injectable 0.5 milliGRAM(s) IV Push every 3 hours PRN mod-severe pain  ondansetron Injectable 4 milliGRAM(s) IV Push every 8 hours PRN Nausea and/or Vomiting    PRESENT SYMPTOMS:  Source: [X ] Patient   [X ] Family   [X ] Team     Pain:                        [ ] No [ X] Yes             [ ] Mild [ ] Moderate [ ] Severe    Onset -  Location - RLE, abdomen  Duration -  Character -  Alleviating/Aggravating -  Radiation -  Timing -  Unable to obtain from patient    Dyspnea:                [X ] No [ ] Yes             [ ] Mild [ ] Moderate [ ] Severe    Anxiety:                  [X ] No [ ] Yes             [ ] Mild [ ] Moderate [ ] Severe    Fatigue:                  [ ] No [X ] Yes             [ ] Mild [X ] Moderate [ ] Severe    Nausea:                  [ ] No [ ] Yes             [ ] Mild [ ] Moderate [ ] Severe    Loss of appetite:   [ ] No [X ] Yes             [ ] Mild [ ] Moderate [ ] Severe    Constipation:        [X ] No [ ] Yes             [ ] Mild [ ] Moderate [ ] Severe    Other Symptoms:  [ ] All other review of systems negative   [X ] Unable to obtain due to poor mentation     Karnofsky Performance Score/Palliative Performance Status Version 2:  30-40%    PHYSICAL EXAM:  Vital Signs Last 24 Hrs  T(C): 35.3 (04 Jun 2018 09:19), Max: 36.2 (03 Jun 2018 15:25)  T(F): 95.6 (04 Jun 2018 09:19), Max: 97.2 (03 Jun 2018 15:25)  HR: 85 (04 Jun 2018 09:19) (58 - 88)  BP: 92/62 (04 Jun 2018 09:19) (57/43 - 108/73)  BP(mean): --  RR: 18 (04 Jun 2018 09:19) (18 - 18)  SpO2: 100% (04 Jun 2018 09:19) (95% - 100%)    General:  [ ] Alert  [ ] Oriented x      [X ] Lethargic  [ ] Agitated   [ ] Cachexia   [ ] Unarousable  [X ] Verbal  [ ] Non-Verbal  [ ] Sedated    HEENT:  [X ] Normal   [ ] Dry mouth   [ ] ET Tube    [ ] Trach  [ ] Oral lesions    Lungs:   [X ] Clear [ ] Tachypnea  [ ] Audible excessive secretions   [ ] Rhonchi        [ ] Right [ ] Left [ ] Bilateral  [ ] Crackles        [ ] Right [ ] Left [ ] Bilateral  [ ] Wheezing     [ ] Right [ ] Left [ ] Bilateral    Cardiovascular:  [X ] Regular [ ] Irregular [ ] Tachycardia   [ ] Bradycardia  [ ] Murmur [ ] Other    Abdomen: [X ] Soft  [ ] Distended   [X ] +BS  [X ] Non tender [ ] Tender  [ ]PEG   [ ]OGT/ NGT   [ ] Ostomy   Last BM:   6/3/18    Genitourinary: [ ] Normal [ ] Incontinent   [ ] Oliguria/Anuria   [ ] Davidson   [X ] No davidson    Musculoskeletal:  [ ] Normal   [X ] Weakness  [ ] Bedbound/Wheelchair bound [ ] Edema    Neurological: [ ] No focal deficits  [ X] Cognitive impairment  [ ] Dysphagia [ ] Dysarthria [ ] Paresis [ ] Other     Skin: [X ] Normal   [ ] Pressure ulcer(s)     [ ] Rash   [ ] Other    LABS: reviewed               9.1    19.3  )-----------( 106      ( 03 Jun 2018 09:17 )             28.8     06-03    140  |  113<H>  |  21  ----------------------------<  281<H>  3.8   |  15<L>  |  0.92    Ca    7.1<L>      03 Jun 2018 09:17  Phos  4.0     06-03    Shock: [ ] Septic [ ] Cardiogenic [ ] Neurologic [ ] Hypovolemic  Vasopressors x 0  Inotrophs x 0    Protein Calorie Malnutrition: [ ] Mild [ ] Moderate [ ] Severe  Oral Intake: [ ] Unable/mouth care only [ ] Minimal [ X] Moderate [ ] Full Capability  Diet: [ ] NPO [ ] Tube feeds [ ] TPN [X ] Other: soft    RADIOLOGY & ADDITIONAL STUDIES: no new imaging for review    REFERRALS:   [ ] Chaplaincy  [ ] Hospice  [ ] Child Life  [ ] Social Work  [ ] Case management [ ] Holistic Therapy

## 2018-06-05 DIAGNOSIS — R06.81 APNEA, NOT ELSEWHERE CLASSIFIED: ICD-10-CM

## 2018-06-05 DIAGNOSIS — N30.90 CYSTITIS, UNSPECIFIED WITHOUT HEMATURIA: ICD-10-CM

## 2018-06-05 LAB
ALBUMIN SERPL ELPH-MCNC: 1.4 G/DL — LOW (ref 3.3–5)
ALP SERPL-CCNC: 130 U/L — HIGH (ref 40–120)
ALT FLD-CCNC: 14 U/L — SIGNIFICANT CHANGE UP (ref 10–45)
ANION GAP SERPL CALC-SCNC: 11 MMOL/L — SIGNIFICANT CHANGE UP (ref 5–17)
AST SERPL-CCNC: 32 U/L — SIGNIFICANT CHANGE UP (ref 10–40)
BILIRUB SERPL-MCNC: 0.2 MG/DL — SIGNIFICANT CHANGE UP (ref 0.2–1.2)
BUN SERPL-MCNC: 25 MG/DL — HIGH (ref 7–23)
CALCIUM SERPL-MCNC: 7.5 MG/DL — LOW (ref 8.4–10.5)
CHLORIDE SERPL-SCNC: 117 MMOL/L — HIGH (ref 96–108)
CO2 SERPL-SCNC: 18 MMOL/L — LOW (ref 22–31)
CREAT SERPL-MCNC: 1.01 MG/DL — SIGNIFICANT CHANGE UP (ref 0.5–1.3)
GLUCOSE SERPL-MCNC: 66 MG/DL — LOW (ref 70–99)
MAGNESIUM SERPL-MCNC: 2.1 MG/DL — SIGNIFICANT CHANGE UP (ref 1.6–2.6)
PHOSPHATE SERPL-MCNC: 4.1 MG/DL — SIGNIFICANT CHANGE UP (ref 2.5–4.5)
POTASSIUM SERPL-MCNC: 5.3 MMOL/L — SIGNIFICANT CHANGE UP (ref 3.5–5.3)
POTASSIUM SERPL-SCNC: 5.3 MMOL/L — SIGNIFICANT CHANGE UP (ref 3.5–5.3)
PROT SERPL-MCNC: 4.9 G/DL — LOW (ref 6–8.3)
SODIUM SERPL-SCNC: 146 MMOL/L — HIGH (ref 135–145)
T4 FREE SERPL-MCNC: 1.1 NG/DL — SIGNIFICANT CHANGE UP (ref 0.9–1.8)

## 2018-06-05 PROCEDURE — 99232 SBSQ HOSP IP/OBS MODERATE 35: CPT

## 2018-06-05 PROCEDURE — 99233 SBSQ HOSP IP/OBS HIGH 50: CPT | Mod: GC

## 2018-06-05 RX ORDER — CEFTRIAXONE 500 MG/1
1 INJECTION, POWDER, FOR SOLUTION INTRAMUSCULAR; INTRAVENOUS ONCE
Qty: 0 | Refills: 0 | Status: COMPLETED | OUTPATIENT
Start: 2018-06-05 | End: 2018-06-05

## 2018-06-05 RX ORDER — CEFTRIAXONE 500 MG/1
1 INJECTION, POWDER, FOR SOLUTION INTRAMUSCULAR; INTRAVENOUS EVERY 24 HOURS
Qty: 0 | Refills: 0 | Status: DISCONTINUED | OUTPATIENT
Start: 2018-06-06 | End: 2018-06-06

## 2018-06-05 RX ORDER — CEFTRIAXONE 500 MG/1
INJECTION, POWDER, FOR SOLUTION INTRAMUSCULAR; INTRAVENOUS
Qty: 0 | Refills: 0 | Status: DISCONTINUED | OUTPATIENT
Start: 2018-06-05 | End: 2018-06-05

## 2018-06-05 RX ORDER — SODIUM CHLORIDE 9 MG/ML
250 INJECTION, SOLUTION INTRAVENOUS ONCE
Qty: 0 | Refills: 0 | Status: COMPLETED | OUTPATIENT
Start: 2018-06-05 | End: 2018-06-05

## 2018-06-05 RX ORDER — DIGOXIN 250 MCG
125 TABLET ORAL DAILY
Qty: 0 | Refills: 0 | Status: DISCONTINUED | OUTPATIENT
Start: 2018-06-05 | End: 2018-06-06

## 2018-06-05 RX ORDER — METOPROLOL TARTRATE 50 MG
2.5 TABLET ORAL EVERY 6 HOURS
Qty: 0 | Refills: 0 | Status: DISCONTINUED | OUTPATIENT
Start: 2018-06-05 | End: 2018-06-05

## 2018-06-05 RX ORDER — ACETAMINOPHEN 500 MG
650 TABLET ORAL EVERY 6 HOURS
Qty: 0 | Refills: 0 | Status: DISCONTINUED | OUTPATIENT
Start: 2018-06-05 | End: 2018-06-09

## 2018-06-05 RX ORDER — CEFTRIAXONE 500 MG/1
1 INJECTION, POWDER, FOR SOLUTION INTRAMUSCULAR; INTRAVENOUS ONCE
Qty: 0 | Refills: 0 | Status: DISCONTINUED | OUTPATIENT
Start: 2018-06-05 | End: 2018-06-05

## 2018-06-05 RX ORDER — CEFTRIAXONE 500 MG/1
INJECTION, POWDER, FOR SOLUTION INTRAMUSCULAR; INTRAVENOUS
Qty: 0 | Refills: 0 | Status: DISCONTINUED | OUTPATIENT
Start: 2018-06-05 | End: 2018-06-06

## 2018-06-05 RX ADMIN — SODIUM CHLORIDE 500 MILLILITER(S): 9 INJECTION, SOLUTION INTRAVENOUS at 15:57

## 2018-06-05 RX ADMIN — PANTOPRAZOLE SODIUM 40 MILLIGRAM(S): 20 TABLET, DELAYED RELEASE ORAL at 17:20

## 2018-06-05 RX ADMIN — MORPHINE SULFATE 0.5 MILLIGRAM(S): 50 CAPSULE, EXTENDED RELEASE ORAL at 06:45

## 2018-06-05 RX ADMIN — ATORVASTATIN CALCIUM 40 MILLIGRAM(S): 80 TABLET, FILM COATED ORAL at 14:53

## 2018-06-05 RX ADMIN — NYSTATIN CREAM 1 APPLICATION(S): 100000 CREAM TOPICAL at 17:21

## 2018-06-05 RX ADMIN — CEFTRIAXONE 100 GRAM(S): 500 INJECTION, POWDER, FOR SOLUTION INTRAMUSCULAR; INTRAVENOUS at 14:54

## 2018-06-05 RX ADMIN — SODIUM CHLORIDE 250 MILLILITER(S): 9 INJECTION, SOLUTION INTRAVENOUS at 20:35

## 2018-06-05 RX ADMIN — PANTOPRAZOLE SODIUM 40 MILLIGRAM(S): 20 TABLET, DELAYED RELEASE ORAL at 06:45

## 2018-06-05 RX ADMIN — NYSTATIN CREAM 1 APPLICATION(S): 100000 CREAM TOPICAL at 06:45

## 2018-06-05 RX ADMIN — Medication 64 MICROGRAM(S): at 06:45

## 2018-06-05 RX ADMIN — MORPHINE SULFATE 0.5 MILLIGRAM(S): 50 CAPSULE, EXTENDED RELEASE ORAL at 07:10

## 2018-06-05 NOTE — PROCEDURE NOTE - ADDITIONAL PROCEDURE DETAILS
Called by the primary team for davidson catheter placement.   Glans was prepped and cleansed with betadine solution, and a 16 fr coude catheter was inserted in sterile manner, with clear urine output.  Patient tolerated procedure well.  RN present at bedside during procedure.

## 2018-06-05 NOTE — PROGRESS NOTE ADULT - PROBLEM SELECTOR PLAN 1
Son interested in palliative RT at [place], planning for transfer at this time. Daughter stated interest in opinion from rad-onc here, but will discuss further with son Reymundo as he is HCP.

## 2018-06-05 NOTE — PROGRESS NOTE ADULT - SUBJECTIVE AND OBJECTIVE BOX
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 Found with acute-on-chronic anemia likely from underlying malignancy, with AF with RVR. No plans for scoping to evaluate due to goals from family. Also with R foot pain, likely with PVD but patient did not want further studies. Also c/b hypotension, likely from multifactorial cause (GI bleed, decreased PO), with no plans for pressors based on goals. s/p zosyn for possible PNA. Per son, she was walking about a month ago, but now is more bedbound, with progressively worsening appetite.     INTERVAL EVENTS: patient appears comfortable, lethargic.    ADVANCE DIRECTIVES:    DNR [X ] YES [ ] NO                            [X ] Completed  MOLST  [ ] YES [ ] NO                      [ ] Completed  Health Care Proxy [X ] YES  [ ] NO   [X ] Completed  Living Will  [ ] YES [ ] NO             [ ] Surrogate  [X ] HCP  [ ] Guardian:    son  Reymundo Figueredolucie ()     ALLERGIES:   Allergies    No Known Allergies    Intolerances    MEDICATIONS  (STANDING):  calcium carbonate 500 mG (Tums) Chewable 1 Tablet(s) Chew once  cefTRIAXone   IVPB      dextrose 5% + lactated ringers. 1000 milliLiter(s) (30 mL/Hr) IV Continuous <Continuous>  digoxin  Injectable 125 MICROGram(s) IV Push daily  lactated ringers Bolus 250 milliLiter(s) IV Bolus once  levothyroxine Injectable 64 MICROGram(s) IV Push <User Schedule>  melatonin 3 milliGRAM(s) Oral at bedtime  metoprolol tartrate Injectable 2.5 milliGRAM(s) IV Push every 6 hours  mirtazapine 7.5 milliGRAM(s) Oral daily  nystatin Powder 1 Application(s) Topical two times a day  pantoprazole  Injectable 40 milliGRAM(s) IV Push two times a day    MEDICATIONS  (PRN):  acetaminophen    Suspension 650 milliGRAM(s) Oral every 6 hours PRN Mild pain  morphine  - Injectable 0.5 milliGRAM(s) IV Push every 3 hours PRN mod-severe pain  ondansetron Injectable 4 milliGRAM(s) IV Push every 8 hours PRN Nausea and/or Vomiting    PRESENT SYMPTOMS:  Source: [X ] Patient   [X ] Family   [X ] Team     Pain:                        [ ] No [ X] Yes             [ ] Mild [ ] Moderate [ ] Severe    Onset -  Location - RLE, abdomen  Duration -  Character -  Alleviating/Aggravating -  Radiation -  Timing -  Unable to obtain from patient    Dyspnea:                [X ] No [ ] Yes             [ ] Mild [ ] Moderate [ ] Severe    Anxiety:                  [X ] No [ ] Yes             [ ] Mild [ ] Moderate [ ] Severe    Fatigue:                  [ ] No [X ] Yes             [ ] Mild [X ] Moderate [ ] Severe    Nausea:                  [ ] No [ ] Yes             [ ] Mild [ ] Moderate [ ] Severe    Loss of appetite:   [ ] No [X ] Yes             [ ] Mild [ ] Moderate [ ] Severe    Constipation:        [X ] No [ ] Yes             [ ] Mild [ ] Moderate [ ] Severe    Other Symptoms:  [ ] All other review of systems negative   [X ] Unable to obtain due to poor mentation     Karnofsky Performance Score/Palliative Performance Status Version 2:  30-40%    PHYSICAL EXAM:  Vital Signs Last 24 Hrs  T(C): 35.6 (05 Jun 2018 14:49), Max: 35.8 (05 Jun 2018 11:13)  T(F): 96 (05 Jun 2018 14:49), Max: 96.5 (05 Jun 2018 11:13)  HR: 60 (05 Jun 2018 14:49) (60 - 123)  BP: 84/59 (05 Jun 2018 14:49) (79/60 - 124/87)  BP(mean): --  RR: 19 (05 Jun 2018 14:49) (19 - 20)  SpO2: 96% (05 Jun 2018 14:49) (92% - 100%)    General:  [ ] Alert  [ ] Oriented x      [X ] Lethargic  [ ] Agitated   [ ] Cachexia   [ ] Unarousable  [X ] Verbal  [ ] Non-Verbal  [ ] Sedated    HEENT:  [X ] Normal   [ ] Dry mouth   [ ] ET Tube    [ ] Trach  [ ] Oral lesions    Lungs:   [ ] Clear [ ] Tachypnea  [ ] Audible excessive secretions   [ ] Rhonchi        [ ] Right [ ] Left [ ] Bilateral  [ ] Crackles        [ ] Right [ ] Left [ ] Bilateral  [ ] Wheezing     [ ] Right [ ] Left [ ] Bilateral    Cardiovascular:  [ ] Regular [ ] Irregular [ ] Tachycardia   [ ] Bradycardia  [ ] Murmur [ ] Other    Abdomen: [ ] Soft  [ ] Distended   [ ] +BS  [X ] Non tender [ ] Tender  [ ]PEG   [ ]OGT/ NGT   [ ] Ostomy   Last BM:   6/3/18    Genitourinary: [ ] Normal [ ] Incontinent   [ ] Oliguria/Anuria   [ ] Davidson   [X ] No davidson    Musculoskeletal:  [ ] Normal   [X ] Weakness  [ ] Bedbound/Wheelchair bound [ ] Edema    Neurological: [ ] No focal deficits  [ X] Cognitive impairment  [ ] Dysphagia [ ] Dysarthria [ ] Paresis [ ] Other     Skin: [X ] Normal   [ ] Pressure ulcer(s)     [ ] Rash   [ ] Other    LABS: reviewed    06-05    146<H>  |  117<H>  |  25<H>  ----------------------------<  66<L>  5.3   |  18<L>  |  1.01    Ca    7.5<L>      05 Jun 2018 10:57  Phos  4.1     06-05  Mg     2.1     06-05      Shock: [ ] Septic [ ] Cardiogenic [ ] Neurologic [ ] Hypovolemic  Vasopressors x 0  Inotrophs x 0    Protein Calorie Malnutrition: [ ] Mild [ ] Moderate [ ] Severe  Oral Intake: [ ] Unable/mouth care only [ ] Minimal [ X] Moderate [ ] Full Capability  Diet: [ ] NPO [ ] Tube feeds [ ] TPN [X ] Other: soft    RADIOLOGY & ADDITIONAL STUDIES: no new imaging for review    REFERRALS:   [ ] Chaplaincy  [ ] Hospice  [ ] Child Life  [ ] Social Work  [ ] Case management [ ] Holistic Therapy

## 2018-06-05 NOTE — PROGRESS NOTE ADULT - PROBLEM SELECTOR PLAN 4
Home regimen synthroid 75 mcg daily. Per daughter, pt reported she wasn't taking her medications the 2 weeks prior to discharge because she felt too sick. c/b suspected poor absorption  -TSH significantly elevated 31.9. Tree T4 and T3 low  -T4 did not improve after being treated with synthroid x 1 week  -Per endocrine, switch to synthroid 64mcgIV daily then discharge on 88mcg PO. Will need repeat TSH in 4 weeks [facility] readdressed with pt's children. Pt's son Dr. Reymundo Rivera is the HCP (documentation in chart). Comfort measures  -Blood draws every other day. IVF, blood transfusions acceptable. No central lines, pressors, scope  -Palliative care consulted, HCP pursing transfer to [facility] for possible palliative RT. Discussed with son Dr. Jann Rivera and daughter and they are open to inpatient hospice.  -Hypoglycemia from poor PO intake, switch D5 in LR  -Palliative care in contact with HCP

## 2018-06-05 NOTE — PROGRESS NOTE ADULT - SUBJECTIVE AND OBJECTIVE BOX
Internal Medicine Progress Note    Claire De Paz, PGY1  Internal Medicine, team 1  Pager [phone] / [phone]  After 7PM on weekdays and 12PM on weekends, please page #[phone]    Patient is a 73y old  Female who presents with a chief complaint of Lethargy, abd pain (23 May 2018 16:13)      SUBJECTIVE / OVERNIGHT EVENTS: BP 79/60 overnight, given 500cc LR bolus improved to 109/70. Hypothermic starting yesterday, bear hugger applied yesterday.    MEDICATIONS  (STANDING):  atorvastatin 40 milliGRAM(s) Oral at bedtime  calcium carbonate 500 mG (Tums) Chewable 1 Tablet(s) Chew once  dextrose 5% + lactated ringers. 1000 milliLiter(s) (30 mL/Hr) IV Continuous <Continuous>  digoxin     Tablet 0.125 milliGRAM(s) Oral daily  levothyroxine Injectable 64 MICROGram(s) IV Push <User Schedule>  melatonin 3 milliGRAM(s) Oral at bedtime  metoprolol tartrate 12.5 milliGRAM(s) Oral two times a day  mirtazapine 7.5 milliGRAM(s) Oral daily  nystatin Powder 1 Application(s) Topical two times a day  pantoprazole  Injectable 40 milliGRAM(s) IV Push two times a day    MEDICATIONS  (PRN):  acetaminophen   Tablet. 650 milliGRAM(s) Oral every 6 hours PRN Mild Pain (1 - 3)  morphine  - Injectable 0.5 milliGRAM(s) IV Push every 3 hours PRN mod-severe pain  ondansetron Injectable 4 milliGRAM(s) IV Push every 8 hours PRN Nausea and/or Vomiting      Vital Signs Last 24 Hrs  T(C): 35.1 (2018 06:33), Max: 35.5 (2018 01:20)  T(F): 95.2 (2018 06:33), Max: 95.9 (2018 01:20)  HR: 76 (2018 06:33) (76 - 123)  BP: 109/70 (2018 06:33) (79/60 - 124/87)  BP(mean): --  RR: 20 (2018 06:33) (18 - 20)  SpO2: 92% (2018 06:33) (92% - 100%)    CAPILLARY BLOOD GLUCOSE        I&O's Summary    2018 07:01  -  2018 07:00  --------------------------------------------------------  IN: 1260 mL / OUT: 100 mL / NET: 1160 mL      PHYSICAL EXAM  GENERAL: NAD, alert, appears chronically ill   HEAD:  Atraumatic, temporal wasting  EYES: EOMI, PERRLA, conjunctiva and sclera clear  NECK: Supple, No JVD  CHEST/LUNG: Improved rales, normal resp effort and rate.   HEART: Irregular rate and rhythm; No murmurs, rubs, or gallops  ABDOMEN: Soft, Nontender, Nondistended; Bowel sounds present, + incontinent of black liquid stool  EXTREMITIES:  2+ Peripheral Pulses, No clubbing, cyanosis. 2+ pitting pretibial edema up to thighs and hips  NEURO/PSYCH: AAOx1 (self), aphasia, otherwise nonfocal  SKIN: Erythematous, swollen vulva and inguinal region. Lower extremity and sacral edema      LABS:                         9.1    19.3  )-----------( 106      ( 2018 09:17 )             28.8     CBC Full  -  ( 2018 09:17 )  WBC Count : 19.3 K/uL  Hemoglobin : 9.1 g/dL  Hematocrit : 28.8 %  Platelet Count - Automated : 106 K/uL  Mean Cell Volume : 95.4 fl  Mean Cell Hemoglobin : 30.1 pg  Mean Cell Hemoglobin Concentration : 31.6 gm/dL  Auto Neutrophil # : x  Auto Lymphocyte # : x  Auto Monocyte # : x  Auto Eosinophil # : x  Auto Basophil # : x  Auto Neutrophil % : x  Auto Lymphocyte % : x  Auto Monocyte % : x  Auto Eosinophil % : x  Auto Basophil % : x    06-03    140  |  113<H>  |  21  ----------------------------<  281<H>  3.8   |  15<L>  |  0.92    Ca    7.1<L>      2018 09:17  Phos  4.0     06-03    TPro  4.6<L>  /  Alb  1.4<L>  /  TBili  0.2  /  DBili  x   /  AST  11  /  ALT  10  /  AlkPhos  116  06-03    Creatinine Trend: 0.92<--, 0.70<--, 0.78<--, 0.80<--, 0.92<--, 0.89<--  LIVER FUNCTIONS - ( 2018 09:17 )  Alb: 1.4 g/dL / Pro: 4.6 g/dL / ALK PHOS: 116 U/L / ALT: 10 U/L / AST: 11 U/L / GGT: x           PT/INR - ( 2018 09:17 )   PT: 14.8 sec;   INR: 1.36 ratio         PTT - ( 2018 09:17 )  PTT:43.4 sec      ABG - ( 2018 09:22 )  pH, Arterial: 7.31  pH, Blood: x     /  pCO2: 34    /  pO2: 151   / HCO3: 17    / Base Excess: -8.0  /  SaO2: 99                Urinalysis Basic - ( 2018 20:10 )    Color: Yellow / Appearance: SL Turbid / S.025 / pH: x  Gluc: x / Ketone: Negative  / Bili: Negative / Urobili: Negative   Blood: x / Protein: 100 mg/dL / Nitrite: Negative   Leuk Esterase: Small / RBC: 0-2 /HPF / WBC 5-10 /HPF   Sq Epi: x / Non Sq Epi: Occasional /HPF / Bacteria: Many /HPF        MICROBIOLOGY:    RADIOLOGY & ADDITIONAL TESTS:     CONSULTS: endocrinology, palliative care Internal Medicine Progress Note    Claire De Paz, PGY1  Internal Medicine, team 1  Pager [phone] / [phone]  After 7PM on weekdays and 12PM on weekends, please page #[phone]    Patient is a 73y old  Female who presents with a chief complaint of Lethargy, abd pain (23 May 2018 16:13)      SUBJECTIVE / OVERNIGHT EVENTS: BP 79/60 overnight, given 500cc LR bolus improved to 109/70. Hypothermic starting yesterday, bear hugger applied yesterday. Urology straight cathed pt, UA positive for cystitis.    MEDICATIONS  (STANDING):  atorvastatin 40 milliGRAM(s) Oral at bedtime  calcium carbonate 500 mG (Tums) Chewable 1 Tablet(s) Chew once  dextrose 5% + lactated ringers. 1000 milliLiter(s) (30 mL/Hr) IV Continuous <Continuous>  digoxin     Tablet 0.125 milliGRAM(s) Oral daily  levothyroxine Injectable 64 MICROGram(s) IV Push <User Schedule>  melatonin 3 milliGRAM(s) Oral at bedtime  metoprolol tartrate 12.5 milliGRAM(s) Oral two times a day  mirtazapine 7.5 milliGRAM(s) Oral daily  nystatin Powder 1 Application(s) Topical two times a day  pantoprazole  Injectable 40 milliGRAM(s) IV Push two times a day    MEDICATIONS  (PRN):  acetaminophen   Tablet. 650 milliGRAM(s) Oral every 6 hours PRN Mild Pain (1 - 3)  morphine  - Injectable 0.5 milliGRAM(s) IV Push every 3 hours PRN mod-severe pain  ondansetron Injectable 4 milliGRAM(s) IV Push every 8 hours PRN Nausea and/or Vomiting      Vital Signs Last 24 Hrs  T(C): 35.1 (2018 06:33), Max: 35.5 (2018 01:20)  T(F): 95.2 (2018 06:33), Max: 95.9 (2018 01:20)  HR: 76 (2018 06:33) (76 - 123)  BP: 109/70 (2018 06:33) (79/60 - 124/87)  BP(mean): --  RR: 20 (2018 06:33) (18 - 20)  SpO2: 92% (2018 06:33) (92% - 100%)    CAPILLARY BLOOD GLUCOSE        I&O's Summary    2018 07:01  -  2018 07:00  --------------------------------------------------------  IN: 1260 mL / OUT: 100 mL / NET: 1160 mL      PHYSICAL EXAM  GENERAL: NAD, alert, appears chronically ill   HEAD:  Atraumatic, temporal wasting  EYES: EOMI, PERRLA, conjunctiva and sclera clear  NECK: Supple, No JVD  CHEST/LUNG: Improved rales, normal resp effort and rate.   HEART: Irregular rate and rhythm; No murmurs, rubs, or gallops  ABDOMEN: Soft, Nontender, Nondistended; Bowel sounds present, + incontinent of black liquid stool  EXTREMITIES:  2+ Peripheral Pulses, No clubbing, cyanosis. 2+ pitting pretibial edema up to thighs and hips  NEURO/PSYCH: AAOx1 (self), aphasia, otherwise nonfocal  SKIN: Erythematous, swollen vulva and inguinal region. Lower extremity and sacral edema      LABS:                         9.1    19.3  )-----------( 106      ( 2018 09:17 )             28.8     CBC Full  -  ( 2018 09:17 )  WBC Count : 19.3 K/uL  Hemoglobin : 9.1 g/dL  Hematocrit : 28.8 %  Platelet Count - Automated : 106 K/uL  Mean Cell Volume : 95.4 fl  Mean Cell Hemoglobin : 30.1 pg  Mean Cell Hemoglobin Concentration : 31.6 gm/dL  Auto Neutrophil # : x  Auto Lymphocyte # : x  Auto Monocyte # : x  Auto Eosinophil # : x  Auto Basophil # : x  Auto Neutrophil % : x  Auto Lymphocyte % : x  Auto Monocyte % : x  Auto Eosinophil % : x  Auto Basophil % : x    06-03    140  |  113<H>  |  21  ----------------------------<  281<H>  3.8   |  15<L>  |  0.92    Ca    7.1<L>      2018 09:17  Phos  4.0     06-03    TPro  4.6<L>  /  Alb  1.4<L>  /  TBili  0.2  /  DBili  x   /  AST  11  /  ALT  10  /  AlkPhos  116  -03    Creatinine Trend: 0.92<--, 0.70<--, 0.78<--, 0.80<--, 0.92<--, 0.89<--  LIVER FUNCTIONS - ( 2018 09:17 )  Alb: 1.4 g/dL / Pro: 4.6 g/dL / ALK PHOS: 116 U/L / ALT: 10 U/L / AST: 11 U/L / GGT: x           PT/INR - ( 2018 09:17 )   PT: 14.8 sec;   INR: 1.36 ratio         PTT - ( 2018 09:17 )  PTT:43.4 sec      ABG - ( 2018 09:22 )  pH, Arterial: 7.31  pH, Blood: x     /  pCO2: 34    /  pO2: 151   / HCO3: 17    / Base Excess: -8.0  /  SaO2: 99                Urinalysis Basic - ( 2018 20:10 )    Color: Yellow / Appearance: SL Turbid / S.025 / pH: x  Gluc: x / Ketone: Negative  / Bili: Negative / Urobili: Negative   Blood: x / Protein: 100 mg/dL / Nitrite: Negative   Leuk Esterase: Small / RBC: 0-2 /HPF / WBC 5-10 /HPF   Sq Epi: x / Non Sq Epi: Occasional /HPF / Bacteria: Many /HPF        MICROBIOLOGY:    RADIOLOGY & ADDITIONAL TESTS:     CONSULTS: endocrinology, palliative care Internal Medicine Progress Note    Claire De Paz, PGY1  Internal Medicine, team 1  Pager [phone] / [phone]  After 7PM on weekdays and 12PM on weekends, please page #[phone]    Patient is a 73y old  Female who presents with a chief complaint of Lethargy, abd pain (23 May 2018 16:13)      SUBJECTIVE / OVERNIGHT EVENTS: BP 79/60 overnight, given 500cc LR bolus improved to 109/70. Hypothermic yesterday, bear hugger applied. Urology straight cathed pt, UA positive for cystitis. BCx to be sent and will start CTX if HCP Dr. Rivera agrees.    MEDICATIONS  (STANDING):  atorvastatin 40 milliGRAM(s) Oral at bedtime  calcium carbonate 500 mG (Tums) Chewable 1 Tablet(s) Chew once  dextrose 5% + lactated ringers. 1000 milliLiter(s) (30 mL/Hr) IV Continuous <Continuous>  digoxin     Tablet 0.125 milliGRAM(s) Oral daily  levothyroxine Injectable 64 MICROGram(s) IV Push <User Schedule>  melatonin 3 milliGRAM(s) Oral at bedtime  metoprolol tartrate 12.5 milliGRAM(s) Oral two times a day  mirtazapine 7.5 milliGRAM(s) Oral daily  nystatin Powder 1 Application(s) Topical two times a day  pantoprazole  Injectable 40 milliGRAM(s) IV Push two times a day    MEDICATIONS  (PRN):  acetaminophen   Tablet. 650 milliGRAM(s) Oral every 6 hours PRN Mild Pain (1 - 3)  morphine  - Injectable 0.5 milliGRAM(s) IV Push every 3 hours PRN mod-severe pain  ondansetron Injectable 4 milliGRAM(s) IV Push every 8 hours PRN Nausea and/or Vomiting      Vital Signs Last 24 Hrs  T(C): 35.1 (2018 06:33), Max: 35.5 (2018 01:20)  T(F): 95.2 (2018 06:33), Max: 95.9 (2018 01:20)  HR: 76 (2018 06:33) (76 - 123)  BP: 109/70 (2018 06:33) (79/60 - 124/87)  BP(mean): --  RR: 20 (2018 06:33) (18 - 20)  SpO2: 92% (2018 06:33) (92% - 100%)    CAPILLARY BLOOD GLUCOSE        I&O's Summary    2018 07:01  -  2018 07:00  --------------------------------------------------------  IN: 1260 mL / OUT: 100 mL / NET: 1160 mL      PHYSICAL EXAM  GENERAL: NAD, alert, appears chronically ill   HEAD:  Atraumatic, temporal wasting  EYES: EOMI, PERRLA, conjunctiva and sclera clear  NECK: Supple, No JVD  CHEST/LUNG: Improved rales, normal resp effort and rate.   HEART: Irregular rate and rhythm; No murmurs, rubs, or gallops  ABDOMEN: Soft, Nontender, Nondistended; Bowel sounds present, + incontinent of black liquid stool  EXTREMITIES:  2+ Peripheral Pulses, No clubbing, cyanosis. 2+ pitting pretibial edema up to thighs and hips  NEURO/PSYCH: AAOx1 (self), aphasia, otherwise nonfocal  SKIN: Erythematous, swollen vulva and inguinal region. Lower extremity and sacral edema      LABS:                         9.1    19.3  )-----------( 106      ( 2018 09:17 )             28.8     CBC Full  -  ( 2018 09:17 )  WBC Count : 19.3 K/uL  Hemoglobin : 9.1 g/dL  Hematocrit : 28.8 %  Platelet Count - Automated : 106 K/uL  Mean Cell Volume : 95.4 fl  Mean Cell Hemoglobin : 30.1 pg  Mean Cell Hemoglobin Concentration : 31.6 gm/dL  Auto Neutrophil # : x  Auto Lymphocyte # : x  Auto Monocyte # : x  Auto Eosinophil # : x  Auto Basophil # : x  Auto Neutrophil % : x  Auto Lymphocyte % : x  Auto Monocyte % : x  Auto Eosinophil % : x  Auto Basophil % : x    06-03    140  |  113<H>  |  21  ----------------------------<  281<H>  3.8   |  15<L>  |  0.92    Ca    7.1<L>      2018 09:17  Phos  4.0     06-03    TPro  4.6<L>  /  Alb  1.4<L>  /  TBili  0.2  /  DBili  x   /  AST  11  /  ALT  10  /  AlkPhos  116      Creatinine Trend: 0.92<--, 0.70<--, 0.78<--, 0.80<--, 0.92<--, 0.89<--  LIVER FUNCTIONS - ( 2018 09:17 )  Alb: 1.4 g/dL / Pro: 4.6 g/dL / ALK PHOS: 116 U/L / ALT: 10 U/L / AST: 11 U/L / GGT: x           PT/INR - ( 2018 09:17 )   PT: 14.8 sec;   INR: 1.36 ratio         PTT - ( 2018 09:17 )  PTT:43.4 sec      ABG - ( 2018 09:22 )  pH, Arterial: 7.31  pH, Blood: x     /  pCO2: 34    /  pO2: 151   / HCO3: 17    / Base Excess: -8.0  /  SaO2: 99                Urinalysis Basic - ( 2018 20:10 )    Color: Yellow / Appearance: SL Turbid / S.025 / pH: x  Gluc: x / Ketone: Negative  / Bili: Negative / Urobili: Negative   Blood: x / Protein: 100 mg/dL / Nitrite: Negative   Leuk Esterase: Small / RBC: 0-2 /HPF / WBC 5-10 /HPF   Sq Epi: x / Non Sq Epi: Occasional /HPF / Bacteria: Many /HPF        MICROBIOLOGY:    RADIOLOGY & ADDITIONAL TESTS:     CONSULTS: endocrinology, palliative care Internal Medicine Progress Note    Claire De Paz, PGY1  Internal Medicine, team 1  Pager [phone] / [phone]  After 7PM on weekdays and 12PM on weekends, please page #[phone]    Patient is a 73y old  Female who presents with a chief complaint of Lethargy, abd pain (23 May 2018 16:13)      SUBJECTIVE / OVERNIGHT EVENTS: BP 79/60 overnight, given 500cc LR bolus improved to 109/70. Hypothermic yesterday, bear hugger applied. Urology straight cathed pt, UA positive for cystitis. BCx to be sent and will start CTX if HCP Dr. Rivera agrees. ~10 second apneic episode this morning proceeded by b/l arm shaking, started breathing on own and was alert.    MEDICATIONS  (STANDING):  atorvastatin 40 milliGRAM(s) Oral at bedtime  calcium carbonate 500 mG (Tums) Chewable 1 Tablet(s) Chew once  dextrose 5% + lactated ringers. 1000 milliLiter(s) (30 mL/Hr) IV Continuous <Continuous>  digoxin     Tablet 0.125 milliGRAM(s) Oral daily  levothyroxine Injectable 64 MICROGram(s) IV Push <User Schedule>  melatonin 3 milliGRAM(s) Oral at bedtime  metoprolol tartrate 12.5 milliGRAM(s) Oral two times a day  mirtazapine 7.5 milliGRAM(s) Oral daily  nystatin Powder 1 Application(s) Topical two times a day  pantoprazole  Injectable 40 milliGRAM(s) IV Push two times a day    MEDICATIONS  (PRN):  acetaminophen   Tablet. 650 milliGRAM(s) Oral every 6 hours PRN Mild Pain (1 - 3)  morphine  - Injectable 0.5 milliGRAM(s) IV Push every 3 hours PRN mod-severe pain  ondansetron Injectable 4 milliGRAM(s) IV Push every 8 hours PRN Nausea and/or Vomiting      Vital Signs Last 24 Hrs  T(C): 35.1 (2018 06:33), Max: 35.5 (2018 01:20)  T(F): 95.2 (2018 06:33), Max: 95.9 (2018 01:20)  HR: 76 (2018 06:33) (76 - 123)  BP: 109/70 (2018 06:33) (79/60 - 124/87)  BP(mean): --  RR: 20 (2018 06:33) (18 - 20)  SpO2: 92% (2018 06:33) (92% - 100%)    CAPILLARY BLOOD GLUCOSE        I&O's Summary    2018 07:01  -  2018 07:00  --------------------------------------------------------  IN: 1260 mL / OUT: 100 mL / NET: 1160 mL      PHYSICAL EXAM  GENERAL: NAD, alert, appears chronically ill   HEAD:  Atraumatic, temporal wasting  EYES: EOMI, PERRLA, conjunctiva and sclera clear  NECK: Supple, No JVD  CHEST/LUNG: Improved rales, normal resp effort and rate. 10 second apneic episode  HEART: Irregular rate and rhythm; No murmurs, rubs, or gallops  ABDOMEN: Soft, Nontender, Nondistended; Bowel sounds present, + incontinent of black liquid stool  EXTREMITIES:  2+ Peripheral Pulses, No clubbing, cyanosis. 2+ pitting pretibial edema up to thighs and hips, R arm weeping serosanguinous fluid  NEURO/PSYCH: AAOx1 (self), aphasia, otherwise nonfocal  SKIN: Erythematous, swollen vulva and inguinal region. Lower extremity and sacral edema      LABS:   AM labs pending    Urinalysis Basic - ( 2018 20:10 )    Color: Yellow / Appearance: SL Turbid / S.025 / pH: x  Gluc: x / Ketone: Negative  / Bili: Negative / Urobili: Negative   Blood: x / Protein: 100 mg/dL / Nitrite: Negative   Leuk Esterase: Small / RBC: 0-2 /HPF / WBC 5-10 /HPF   Sq Epi: x / Non Sq Epi: Occasional /HPF / Bacteria: Many /HPF        MICROBIOLOGY:    RADIOLOGY & ADDITIONAL TESTS:     CONSULTS: endocrinology, palliative care

## 2018-06-05 NOTE — PROGRESS NOTE ADULT - ATTENDING COMMENTS
ongoing goc discussions w/ HCP  trial eeg to eval apneic episodes. awaiting transfer to [facility] for potential radiation therapy   lost IV access

## 2018-06-05 NOTE — PROGRESS NOTE ADULT - PROBLEM SELECTOR PLAN 2
[facility] readdressed with pt's children. Pt's son  Reymundo Nicole is the HCP (documentation in chart). Comfort measures  -Blood draws every other day. IVF, blood transfusions acceptable. No central lines, pressors, scope  -Palliative care consulted, HCP pursing transfer to [facility] [place] for possible palliative RT. Discussed with son  Jann Rivera and daughter and they are open to inpatient hospice.  -Hypoglycemia from poor PO intake, switch D5 in NS to D5 in LR due to hyperchloremic metabolic acidosis  -Dr. Grullon to reach out to HCP today [facility] readdressed with pt's children. Pt's son Dr. Reymundo Rivera is the HCP (documentation in chart). Comfort measures  -Blood draws every other day. IVF, blood transfusions acceptable. No central lines, pressors, scope  -Palliative care consulted, HCP pursing transfer to [facility] [place] for possible palliative RT. Discussed with son Dr. Jann Rivera and daughter and they are open to inpatient hospice.  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 Likely contributing to hypotension. Continues to have melena  -Patient is comfort care, no plan for scope or to reconsult GI  -Pantoprazole IV BID  -s/p 3u pRBC  -Monitor CBC every other day, maintain active T&S

## 2018-06-05 NOTE — PROGRESS NOTE ADULT - PROBLEM SELECTOR PLAN 3
Pt reporting right foot pain. Right toes are erythematous and cool to touch. Right pedal pulse palpable and present on doppler. Likely underlying PVD.  -Pt refused NADIRA study. HCP aware. Pt is high risk for GIB if anticoagulated  -continue statin Positive UA, will start CTX after BCx drawn

## 2018-06-05 NOTE — PROGRESS NOTE ADULT - PROBLEM SELECTOR PLAN 10
Breathing spontaneous and unlabored. Breath sounds clear and equal bilaterally with regular rhythm. DVT: No pharmacologic 2/2 GIB, SCDs  Diet: mechanical soft  Dispo: Transfer to [facility] placed by HCP for palliative RT, comfort measures DVT: No pharmacologic 2/2 GIB, SCDs  Diet: mechanical soft  Dispo: Transfer to [facility] placed by HCP for palliative RT, comfort measures  Atrial fibrillation: -lopressor IV and digoxin IV, dig level wnl 5/29. No AC 2/2 GIB. Monitor off tele

## 2018-06-05 NOTE — PROGRESS NOTE ADULT - PROBLEM SELECTOR PLAN 1
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 Likely contributing to hypotension. Continues to have melena  -Patient is comfort care, no plan for scope or to reconsult GI  -Pantoprazole IV BID  -s/p 3u pRBC  -Monitor CBC every other day, maintain active T&S Having multiple apneic episodes past few days associated with brief arm shaking  -continuous EEG

## 2018-06-05 NOTE — PROGRESS NOTE ADULT - PROBLEM SELECTOR PLAN 5
Ongoing medical management, patient hypothermic today, ? seizure activity, EEG pending. Current goals for comfort care: blood draws every other day; IVF, blood transfusions acceptable; no pressors or invasive procedures (ex: EGD). DNR/DNI. Goal is for transfer, if/when stable, [place] [facility] [place]. Will sign off for now; please reconsult as needed.

## 2018-06-05 NOTE — PROGRESS NOTE ADULT - PROBLEM SELECTOR PLAN 6
Improved leukocytosis 28 on admission. Tachycardia on admission 2/2 afib with RVR. Unclear whether presence of SIRs is reactive 2/2 malignancy and blood loss vs infectious. Compressive atelectasis on CT, but cannot r/o pneumonia. Per son who is physician, pleural effusion is chronic and has been tapped in past. No other concerning signs for pneumonia at this time. Other possible source of infection was intraabdominal.  -s/p zosyn 5-d course to broadly cover infection, but no source was found.  -BCx no growth to date. C diff negative x 2. Pt is incontinent and edema makes straight cath difficult, but denies dysuria  -Procalcitonin 0.46 indeterminate  -Patient and family would like to pursue comfort measures (antibiotics were discontinued on 5/26)  -low suspicion for active infection at this time  - f/u repeat procalcitonin Improved leukocytosis 28 on admission. Tachycardia on admission 2/2 afib with RVR. Unclear whether presence of SIRs is reactive 2/2 malignancy and blood loss vs infectious. Compressive atelectasis on CT, but cannot r/o pneumonia. Per son who is physician, pleural effusion is chronic and has been tapped in past. No other concerning signs for pneumonia at this time. Other possible source of infection was intraabdominal.  -s/p zosyn 5-d course to broadly cover infection  -BCx no growth to date. C diff negative x 2. Pt is incontinent and edema makes straight cath difficult, but denies dysuria  -Procalcitonin 0.46 indeterminate  -Patient and family would like to pursue comfort measures (antibiotics were discontinued on 5/26)  -low suspicion for active infection at this time  - f/u repeat procalcitonin

## 2018-06-05 NOTE — PROGRESS NOTE ADULT - PROBLEM SELECTOR PLAN 7
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 No current treatment this admission. Discussed care with [place]'s oncologist at [facility]. Possible RT but can be pursued as an outpatient. At this time no further treatment planned given poor performance status. Ongoing C discussions with HCP regarding dispo. She has been living with children prior to admission.  -patient is comfort measures  -Transfer to [facility] for palliative [place] Home regimen synthroid 75 mcg daily. Per daughter, pt reported she wasn't taking her medications the 2 weeks prior to discharge because she felt too sick. c/b suspected poor absorption  -TSH significantly elevated 31.9. Tree T4 and T3 low  -T4 did not improve after being treated with synthroid x 1 week  -Per endocrine, switch to synthroid 64mcgIV daily then discharge on 88mcg PO. Will need repeat TSH in 4 weeks

## 2018-06-05 NOTE — PROGRESS NOTE ADULT - PROBLEM SELECTOR PLAN 9
Home regimen lopressor 50mg BID  -continue lopressor 12.5 BID, monitor for hypotension  -continue dig 0.125mg daily, dig level wnl 5/29  -no AC 2/2 GIB. Monitor off tele -lopressor IVand digoxin IV, dig level wnl 5/29  -no AC 2/2 GIB. 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 No current treatment this admission. Discussed care with pt's oncologist at [facility] [place]. Possible RT but can be pursued as an outpatient. At this time no further treatment planned given poor performance status. Ongoing C discussions with HCP regarding dispo. She has been living with children prior to admission.  -patient is comfort measures  -Transfer to [facility] [place] for palliative RT  -morphine 0.5mg IV q3 prn

## 2018-06-05 NOTE — PROGRESS NOTE ADULT - PROBLEM SELECTOR PLAN 8
2/2 underlying sarcoma with fistula, possible intraabdominal infection. s/p zosyn 5-day course  -Morphine 0.5 q3h PRN 2/2 underlying sarcoma with fistula, possible intraabdominal infection. s/p zosyn 5-day course. Morphine 0.5 q3h PRN Pt reporting right foot pain. Right toes are erythematous and cool to touch. Right pedal pulse palpable and present on doppler. Likely underlying PVD.  -Pt refused NADIRA study. HCP aware. Pt is high risk for GIB if anticoagulated  -continue statin

## 2018-06-06 LAB
ANION GAP SERPL CALC-SCNC: 11 MMOL/L — SIGNIFICANT CHANGE UP (ref 5–17)
BASOPHILS # BLD AUTO: 0 K/UL — SIGNIFICANT CHANGE UP (ref 0–0.2)
BASOPHILS # BLD AUTO: 0.01 K/UL — SIGNIFICANT CHANGE UP (ref 0–0.2)
BASOPHILS NFR BLD AUTO: 0 % — SIGNIFICANT CHANGE UP (ref 0–2)
BASOPHILS NFR BLD AUTO: 0.1 % — SIGNIFICANT CHANGE UP (ref 0–2)
BLD GP AB SCN SERPL QL: NEGATIVE — SIGNIFICANT CHANGE UP
BUN SERPL-MCNC: 26 MG/DL — HIGH (ref 7–23)
CALCIUM SERPL-MCNC: 7.8 MG/DL — LOW (ref 8.4–10.5)
CHLORIDE SERPL-SCNC: 115 MMOL/L — HIGH (ref 96–108)
CO2 SERPL-SCNC: 19 MMOL/L — LOW (ref 22–31)
CREAT SERPL-MCNC: 1.1 MG/DL — SIGNIFICANT CHANGE UP (ref 0.5–1.3)
CULTURE RESULTS: SIGNIFICANT CHANGE UP
EOSINOPHIL # BLD AUTO: 0 K/UL — SIGNIFICANT CHANGE UP (ref 0–0.5)
EOSINOPHIL # BLD AUTO: 0 K/UL — SIGNIFICANT CHANGE UP (ref 0–0.5)
EOSINOPHIL NFR BLD AUTO: 0 % — SIGNIFICANT CHANGE UP (ref 0–6)
EOSINOPHIL NFR BLD AUTO: 0.2 % — SIGNIFICANT CHANGE UP (ref 0–6)
GAS PNL BLDV: SIGNIFICANT CHANGE UP
GLUCOSE SERPL-MCNC: 77 MG/DL — SIGNIFICANT CHANGE UP (ref 70–99)
HCT VFR BLD CALC: 16.4 % — CRITICAL LOW (ref 34.5–45)
HCT VFR BLD CALC: 31.8 % — LOW (ref 34.5–45)
HGB BLD-MCNC: 10 G/DL — LOW (ref 11.5–15.5)
HGB BLD-MCNC: 5.2 G/DL — CRITICAL LOW (ref 11.5–15.5)
IMM GRANULOCYTES NFR BLD AUTO: 0.3 % — SIGNIFICANT CHANGE UP (ref 0–1.5)
LACTATE SERPL-SCNC: 2 MMOL/L — SIGNIFICANT CHANGE UP (ref 0.7–2)
LG PLATELETS BLD QL AUTO: SLIGHT — SIGNIFICANT CHANGE UP
LYMPHOCYTES # BLD AUTO: 0.81 K/UL — LOW (ref 1–3.3)
LYMPHOCYTES # BLD AUTO: 2.2 K/UL — SIGNIFICANT CHANGE UP (ref 1–3.3)
LYMPHOCYTES # BLD AUTO: 7.3 % — LOW (ref 13–44)
LYMPHOCYTES # BLD AUTO: 9.5 % — LOW (ref 13–44)
MCHC RBC-ENTMCNC: 28.7 PG — SIGNIFICANT CHANGE UP (ref 27–34)
MCHC RBC-ENTMCNC: 31.4 GM/DL — LOW (ref 32–36)
MCHC RBC-ENTMCNC: 31.5 PG — SIGNIFICANT CHANGE UP (ref 27–34)
MCHC RBC-ENTMCNC: 31.7 GM/DL — LOW (ref 32–36)
MCV RBC AUTO: 100 FL — SIGNIFICANT CHANGE UP (ref 80–100)
MCV RBC AUTO: 90.6 FL — SIGNIFICANT CHANGE UP (ref 80–100)
MONOCYTES # BLD AUTO: 0.25 K/UL — SIGNIFICANT CHANGE UP (ref 0–0.9)
MONOCYTES # BLD AUTO: 0.7 K/UL — SIGNIFICANT CHANGE UP (ref 0–0.9)
MONOCYTES NFR BLD AUTO: 2.3 % — SIGNIFICANT CHANGE UP (ref 2–14)
MONOCYTES NFR BLD AUTO: 2.9 % — SIGNIFICANT CHANGE UP (ref 2–14)
NEUTROPHILS # BLD AUTO: 20.4 K/UL — HIGH (ref 1.8–7.4)
NEUTROPHILS # BLD AUTO: 9.93 K/UL — HIGH (ref 1.8–7.4)
NEUTROPHILS NFR BLD AUTO: 87.4 % — HIGH (ref 43–77)
NEUTROPHILS NFR BLD AUTO: 90 % — HIGH (ref 43–77)
PLAT MORPH BLD: NORMAL — SIGNIFICANT CHANGE UP
PLATELET # BLD AUTO: 91 K/UL — LOW (ref 150–400)
PLATELET # BLD AUTO: SIGNIFICANT CHANGE UP K/UL (ref 150–400)
POTASSIUM SERPL-MCNC: 4.5 MMOL/L — SIGNIFICANT CHANGE UP (ref 3.5–5.3)
POTASSIUM SERPL-SCNC: 4.5 MMOL/L — SIGNIFICANT CHANGE UP (ref 3.5–5.3)
PROCALCITONIN SERPL-MCNC: 0.18 NG/ML — HIGH (ref 0.02–0.1)
RBC # BLD: 1.81 M/UL — LOW (ref 3.8–5.2)
RBC # BLD: 3.18 M/UL — LOW (ref 3.8–5.2)
RBC # FLD: 24 % — HIGH (ref 10.3–14.5)
RBC # FLD: 24.8 % — HIGH (ref 10.3–14.5)
RBC BLD AUTO: SIGNIFICANT CHANGE UP
RH IG SCN BLD-IMP: POSITIVE — SIGNIFICANT CHANGE UP
SODIUM SERPL-SCNC: 145 MMOL/L — SIGNIFICANT CHANGE UP (ref 135–145)
SPECIMEN SOURCE: SIGNIFICANT CHANGE UP
WBC # BLD: 11.03 K/UL — HIGH (ref 3.8–10.5)
WBC # BLD: 23.3 K/UL — HIGH (ref 3.8–10.5)
WBC # FLD AUTO: 11.03 K/UL — HIGH (ref 3.8–10.5)
WBC # FLD AUTO: 23.3 K/UL — HIGH (ref 3.8–10.5)

## 2018-06-06 PROCEDURE — 93010 ELECTROCARDIOGRAM REPORT: CPT

## 2018-06-06 PROCEDURE — 99233 SBSQ HOSP IP/OBS HIGH 50: CPT | Mod: GC

## 2018-06-06 PROCEDURE — 99232 SBSQ HOSP IP/OBS MODERATE 35: CPT

## 2018-06-06 RX ORDER — SODIUM CHLORIDE 9 MG/ML
250 INJECTION, SOLUTION INTRAVENOUS ONCE
Qty: 0 | Refills: 0 | Status: COMPLETED | OUTPATIENT
Start: 2018-06-06 | End: 2018-06-06

## 2018-06-06 RX ORDER — SODIUM CHLORIDE 9 MG/ML
1000 INJECTION, SOLUTION INTRAVENOUS
Qty: 0 | Refills: 0 | Status: DISCONTINUED | OUTPATIENT
Start: 2018-06-06 | End: 2018-06-09

## 2018-06-06 RX ORDER — PIPERACILLIN AND TAZOBACTAM 4; .5 G/20ML; G/20ML
3.38 INJECTION, POWDER, LYOPHILIZED, FOR SOLUTION INTRAVENOUS EVERY 8 HOURS
Qty: 0 | Refills: 0 | Status: DISCONTINUED | OUTPATIENT
Start: 2018-06-06 | End: 2018-06-08

## 2018-06-06 RX ORDER — MORPHINE SULFATE 50 MG/1
0.5 CAPSULE, EXTENDED RELEASE ORAL
Qty: 0 | Refills: 0 | Status: DISCONTINUED | OUTPATIENT
Start: 2018-06-06 | End: 2018-06-07

## 2018-06-06 RX ADMIN — SODIUM CHLORIDE 30 MILLILITER(S): 9 INJECTION, SOLUTION INTRAVENOUS at 18:57

## 2018-06-06 RX ADMIN — MORPHINE SULFATE 0.5 MILLIGRAM(S): 50 CAPSULE, EXTENDED RELEASE ORAL at 06:23

## 2018-06-06 RX ADMIN — NYSTATIN CREAM 1 APPLICATION(S): 100000 CREAM TOPICAL at 17:15

## 2018-06-06 RX ADMIN — MORPHINE SULFATE 0.5 MILLIGRAM(S): 50 CAPSULE, EXTENDED RELEASE ORAL at 23:38

## 2018-06-06 RX ADMIN — SODIUM CHLORIDE 30 MILLILITER(S): 9 INJECTION, SOLUTION INTRAVENOUS at 14:12

## 2018-06-06 RX ADMIN — MORPHINE SULFATE 0.5 MILLIGRAM(S): 50 CAPSULE, EXTENDED RELEASE ORAL at 14:43

## 2018-06-06 RX ADMIN — MORPHINE SULFATE 0.5 MILLIGRAM(S): 50 CAPSULE, EXTENDED RELEASE ORAL at 18:54

## 2018-06-06 RX ADMIN — PIPERACILLIN AND TAZOBACTAM 25 GRAM(S): 4; .5 INJECTION, POWDER, LYOPHILIZED, FOR SOLUTION INTRAVENOUS at 14:13

## 2018-06-06 RX ADMIN — SODIUM CHLORIDE 250 MILLILITER(S): 9 INJECTION, SOLUTION INTRAVENOUS at 06:17

## 2018-06-06 RX ADMIN — MORPHINE SULFATE 0.5 MILLIGRAM(S): 50 CAPSULE, EXTENDED RELEASE ORAL at 06:53

## 2018-06-06 RX ADMIN — SODIUM CHLORIDE 500 MILLILITER(S): 9 INJECTION, SOLUTION INTRAVENOUS at 19:24

## 2018-06-06 RX ADMIN — MORPHINE SULFATE 0.5 MILLIGRAM(S): 50 CAPSULE, EXTENDED RELEASE ORAL at 14:13

## 2018-06-06 RX ADMIN — MORPHINE SULFATE 0.5 MILLIGRAM(S): 50 CAPSULE, EXTENDED RELEASE ORAL at 23:08

## 2018-06-06 RX ADMIN — PIPERACILLIN AND TAZOBACTAM 25 GRAM(S): 4; .5 INJECTION, POWDER, LYOPHILIZED, FOR SOLUTION INTRAVENOUS at 21:17

## 2018-06-06 RX ADMIN — PIPERACILLIN AND TAZOBACTAM 25 GRAM(S): 4; .5 INJECTION, POWDER, LYOPHILIZED, FOR SOLUTION INTRAVENOUS at 08:05

## 2018-06-06 RX ADMIN — MORPHINE SULFATE 0.5 MILLIGRAM(S): 50 CAPSULE, EXTENDED RELEASE ORAL at 21:14

## 2018-06-06 RX ADMIN — PANTOPRAZOLE SODIUM 40 MILLIGRAM(S): 20 TABLET, DELAYED RELEASE ORAL at 05:53

## 2018-06-06 RX ADMIN — Medication 64 MICROGRAM(S): at 05:53

## 2018-06-06 RX ADMIN — Medication 60 MILLIGRAM(S): at 17:15

## 2018-06-06 RX ADMIN — MORPHINE SULFATE 0.5 MILLIGRAM(S): 50 CAPSULE, EXTENDED RELEASE ORAL at 21:44

## 2018-06-06 RX ADMIN — NYSTATIN CREAM 1 APPLICATION(S): 100000 CREAM TOPICAL at 05:53

## 2018-06-06 RX ADMIN — PANTOPRAZOLE SODIUM 40 MILLIGRAM(S): 20 TABLET, DELAYED RELEASE ORAL at 17:17

## 2018-06-06 RX ADMIN — Medication 125 MICROGRAM(S): at 10:48

## 2018-06-06 NOTE — CHART NOTE - NSCHARTNOTEFT_GEN_A_CORE
Called by RN regarding critical lab value called in at 2:00 for CBC drawn at previous day at 17:44 with Hb of 5.4, no platelet resulted. Labs clearly deviant from prior values. Samples were accessioned in core lab at 17:44 but not resulted until 1:55 AM. Repeat CBC, T&S, and AM labs redrawn at 2:30 AM. Repeat CBC with WBC: 23, Hb: 10, Plt 91. Called by RN regarding critical lab value called in at 2:00 for CBC drawn at previous day at around 16:00 with Hb of 5.4, no platelet resulted. Labs clearly deviant from prior values. Samples were accessioned in core lab at 17:44 but not resulted until 1:55 AM. Repeat CBC, T&S, and AM labs redrawn at 2:30 AM. Repeat CBC with WBC: 23, Hb: 10, Plt 91. Called by RN regarding critical lab value called in at 2:00 for CBC drawn at previous day at around 16:00 with WBC: 11s, Hb of 5.4, no platelet resulted. Per core , samples were received by lab at 17:44 but not resulted until 1:55 AM for unclear reasons. Given labs clearly deviant from prior values, CBC repeated along with T&S and AM labs at 2:30 AM. Repeat CBC with WBC: 23, Hb: 10, Plt 91.

## 2018-06-06 NOTE — PROGRESS NOTE ADULT - SUBJECTIVE AND OBJECTIVE BOX
Internal Medicine Progress Note    Claire De Paz, PGY1  Internal Medicine, team 1  Pager [phone] / [phone]  After 7PM on weekdays and 12PM on weekends, please page #[phone]    Patient is a 73y old  Female who presents with a chief complaint of Lethargy, abd pain (23 May 2018 16:13)      SUBJECTIVE / OVERNIGHT EVENTS:     MEDICATIONS  (STANDING):  calcium carbonate 500 mG (Tums) Chewable 1 Tablet(s) Chew once  cefTRIAXone   IVPB 1 Gram(s) IV Intermittent every 24 hours  cefTRIAXone   IVPB      dextrose 5% + lactated ringers. 1000 milliLiter(s) (30 mL/Hr) IV Continuous <Continuous>  digoxin  Injectable 125 MICROGram(s) IV Push daily  levothyroxine Injectable 64 MICROGram(s) IV Push <User Schedule>  melatonin 3 milliGRAM(s) Oral at bedtime  mirtazapine 7.5 milliGRAM(s) Oral daily  nystatin Powder 1 Application(s) Topical two times a day  pantoprazole  Injectable 40 milliGRAM(s) IV Push two times a day    MEDICATIONS  (PRN):  acetaminophen    Suspension 650 milliGRAM(s) Oral every 6 hours PRN Mild pain  morphine  - Injectable 0.5 milliGRAM(s) IV Push every 3 hours PRN mod-severe pain  ondansetron Injectable 4 milliGRAM(s) IV Push every 8 hours PRN Nausea and/or Vomiting      Vital Signs Last 24 Hrs  T(C): 36.1 (2018 01:58), Max: 36.1 (2018 01:58)  T(F): 96.9 (2018 01:58), Max: 96.9 (2018 01:58)  HR: 126 (2018 05:58) (60 - 126)  BP: 94/71 (2018 05:58) (78/57 - 117/74)  BP(mean): --  RR: 22 (2018 05:58) (19 - 22)  SpO2: 100% (2018 05:58) (90% - 100%)    CAPILLARY BLOOD GLUCOSE          I&O's Summary    2018 07:01  -  2018 07:00  --------------------------------------------------------  IN: 1260 mL / OUT: 100 mL / NET: 1160 mL    2018 07:01  -  2018 06:45  --------------------------------------------------------  IN: 630 mL / OUT: 400 mL / NET: 230 mL      PHYSICAL EXAM  GENERAL: NAD, alert, appears chronically ill   HEAD:  Atraumatic, temporal wasting  EYES: EOMI, PERRLA, conjunctiva and sclera clear  NECK: Supple, No JVD  CHEST/LUNG: Improved rales, normal resp effort and rate. 10 second apneic episode  HEART: Irregular rate and rhythm; No murmurs, rubs, or gallops  ABDOMEN: Soft, Nontender, Nondistended; Bowel sounds present, + incontinent of black liquid stool  EXTREMITIES:  2+ Peripheral Pulses, No clubbing, cyanosis. 2+ pitting pretibial edema up to thighs and hips, R arm weeping serosanguinous fluid  NEURO/PSYCH: AAOx1 (self), aphasia, otherwise nonfocal  SKIN: Erythematous, swollen vulva and inguinal region. Lower extremity and sacral edema      LABS:                                   10.0   23.3  )-----------( 91       ( 2018 02:47 )             31.8     CBC Full  -  ( 2018 02:47 )  WBC Count : 23.3 K/uL  Hemoglobin : 10.0 g/dL  Hematocrit : 31.8 %  Platelet Count - Automated : 91 K/uL  Mean Cell Volume : 100.0 fl  Mean Cell Hemoglobin : 31.5 pg  Mean Cell Hemoglobin Concentration : 31.4 gm/dL  Auto Neutrophil # : x  Auto Lymphocyte # : x  Auto Monocyte # : x  Auto Eosinophil # : x  Auto Basophil # : x  Auto Neutrophil % : x  Auto Lymphocyte % : x  Auto Monocyte % : x  Auto Eosinophil % : x  Auto Basophil % : x    06    146<H>  |  117<H>  |  25<H>  ----------------------------<  66<L>  5.3   |  18<L>  |  1.01    Ca    7.5<L>      2018 10:57  Phos  4.1     -  Mg     2.1     -05    TPro  4.9<L>  /  Alb  1.4<L>  /  TBili  0.2  /  DBili  x   /  AST  32  /  ALT  14  /  AlkPhos  130<H>      Creatinine Trend: 1.01<--, 0.92<--, 0.70<--, 0.78<--, 0.80<--, 0.92<--  LIVER FUNCTIONS - ( 2018 10:57 )  Alb: 1.4 g/dL / Pro: 4.9 g/dL / ALK PHOS: 130 U/L / ALT: 14 U/L / AST: 32 U/L / GGT: x                   Urinalysis Basic - ( 2018 20:10 )    Color: Yellow / Appearance: SL Turbid / S.025 / pH: x  Gluc: x / Ketone: Negative  / Bili: Negative / Urobili: Negative   Blood: x / Protein: 100 mg/dL / Nitrite: Negative   Leuk Esterase: Small / RBC: 0-2 /HPF / WBC 5-10 /HPF   Sq Epi: x / Non Sq Epi: Occasional /HPF / Bacteria: Many /HPF      MICROBIOLOGY:    RADIOLOGY & ADDITIONAL TESTS:     CONSULTS: endocrinology, palliative care Internal Medicine Progress Note    Claire De Paz, PGY1  Internal Medicine, team 1  Pager [phone] / [phone]  After 7PM on weekdays and 12PM on weekends, please page #[phone]    Patient is a 73y old  Female who presents with a chief complaint of Lethargy, abd pain (23 May 2018 16:13)      SUBJECTIVE / OVERNIGHT EVENTS: CBC resulted overnight with Hg 5.2, repeated and was Hg 10.    MEDICATIONS  (STANDING):  calcium carbonate 500 mG (Tums) Chewable 1 Tablet(s) Chew once  cefTRIAXone   IVPB 1 Gram(s) IV Intermittent every 24 hours  cefTRIAXone   IVPB      dextrose 5% + lactated ringers. 1000 milliLiter(s) (30 mL/Hr) IV Continuous <Continuous>  digoxin  Injectable 125 MICROGram(s) IV Push daily  levothyroxine Injectable 64 MICROGram(s) IV Push <User Schedule>  melatonin 3 milliGRAM(s) Oral at bedtime  mirtazapine 7.5 milliGRAM(s) Oral daily  nystatin Powder 1 Application(s) Topical two times a day  pantoprazole  Injectable 40 milliGRAM(s) IV Push two times a day    MEDICATIONS  (PRN):  acetaminophen    Suspension 650 milliGRAM(s) Oral every 6 hours PRN Mild pain  morphine  - Injectable 0.5 milliGRAM(s) IV Push every 3 hours PRN mod-severe pain  ondansetron Injectable 4 milliGRAM(s) IV Push every 8 hours PRN Nausea and/or Vomiting      Vital Signs Last 24 Hrs  T(C): 36.1 (2018 01:58), Max: 36.1 (2018 01:58)  T(F): 96.9 (2018 01:58), Max: 96.9 (2018 01:58)  HR: 126 (2018 05:58) (60 - 126)  BP: 94/71 (2018 05:58) (78/57 - 117/74)  BP(mean): --  RR: 22 (2018 05:58) (19 - 22)  SpO2: 100% (2018 05:58) (90% - 100%)    CAPILLARY BLOOD GLUCOSE          I&O's Summary    2018 07:01  -  2018 07:00  --------------------------------------------------------  IN: 1260 mL / OUT: 100 mL / NET: 1160 mL    2018 07:01  -  2018 06:45  --------------------------------------------------------  IN: 630 mL / OUT: 400 mL / NET: 230 mL      PHYSICAL EXAM  GENERAL: NAD, alert, appears chronically ill   HEAD:  Atraumatic, temporal wasting  EYES: EOMI, PERRLA, conjunctiva and sclera clear  NECK: Supple, No JVD  CHEST/LUNG: Improved rales, normal resp effort and rate. 10 second apneic episode  HEART: Irregular rate and rhythm; No murmurs, rubs, or gallops  ABDOMEN: Soft, Nontender, Nondistended; Bowel sounds present, + incontinent of black liquid stool  EXTREMITIES:  2+ Peripheral Pulses, No clubbing, cyanosis. 2+ pitting pretibial edema up to thighs and hips, R arm weeping serosanguinous fluid  NEURO/PSYCH: AAOx1 (self), aphasia, otherwise nonfocal  SKIN: Erythematous, swollen vulva and inguinal region. Lower extremity and sacral edema      LABS:                                   10.0   23.3  )-----------( 91       ( 2018 02:47 )             31.8     CBC Full  -  ( 2018 02:47 )  WBC Count : 23.3 K/uL  Hemoglobin : 10.0 g/dL  Hematocrit : 31.8 %  Platelet Count - Automated : 91 K/uL  Mean Cell Volume : 100.0 fl  Mean Cell Hemoglobin : 31.5 pg  Mean Cell Hemoglobin Concentration : 31.4 gm/dL  Auto Neutrophil # : x  Auto Lymphocyte # : x  Auto Monocyte # : x  Auto Eosinophil # : x  Auto Basophil # : x  Auto Neutrophil % : x  Auto Lymphocyte % : x  Auto Monocyte % : x  Auto Eosinophil % : x  Auto Basophil % : x    06    146<H>  |  117<H>  |  25<H>  ----------------------------<  66<L>  5.3   |  18<L>  |  1.01    Ca    7.5<L>      2018 10:57  Phos  4.1     06-  Mg     2.1     -    TPro  4.9<L>  /  Alb  1.4<L>  /  TBili  0.2  /  DBili  x   /  AST  32  /  ALT  14  /  AlkPhos  130<H>  -    Creatinine Trend: 1.01<--, 0.92<--, 0.70<--, 0.78<--, 0.80<--, 0.92<--  LIVER FUNCTIONS - ( 2018 10:57 )  Alb: 1.4 g/dL / Pro: 4.9 g/dL / ALK PHOS: 130 U/L / ALT: 14 U/L / AST: 32 U/L / GGT: x                   Urinalysis Basic - ( 2018 20:10 )    Color: Yellow / Appearance: SL Turbid / S.025 / pH: x  Gluc: x / Ketone: Negative  / Bili: Negative / Urobili: Negative   Blood: x / Protein: 100 mg/dL / Nitrite: Negative   Leuk Esterase: Small / RBC: 0-2 /HPF / WBC 5-10 /HPF   Sq Epi: x / Non Sq Epi: Occasional /HPF / Bacteria: Many /HPF      MICROBIOLOGY:    RADIOLOGY & ADDITIONAL TESTS:     CONSULTS: endocrinology, palliative care Internal Medicine Progress Note    Claire De Paz, PGY1  Internal Medicine, team 1  Pager [phone] / [phone]  After 7PM on weekdays and 12PM on weekends, please page #[phone]    Patient is a 73y old  Female who presents with a chief complaint of Lethargy, abd pain (23 May 2018 16:13)      SUBJECTIVE / OVERNIGHT EVENTS: CBC resulted overnight with Hg 5.2, repeated and was Hg 10. Bladder scan elevated, davidson placed 400 cc UOP.    MEDICATIONS  (STANDING):  calcium carbonate 500 mG (Tums) Chewable 1 Tablet(s) Chew once  cefTRIAXone   IVPB 1 Gram(s) IV Intermittent every 24 hours  cefTRIAXone   IVPB      dextrose 5% + lactated ringers. 1000 milliLiter(s) (30 mL/Hr) IV Continuous <Continuous>  digoxin  Injectable 125 MICROGram(s) IV Push daily  levothyroxine Injectable 64 MICROGram(s) IV Push <User Schedule>  melatonin 3 milliGRAM(s) Oral at bedtime  mirtazapine 7.5 milliGRAM(s) Oral daily  nystatin Powder 1 Application(s) Topical two times a day  pantoprazole  Injectable 40 milliGRAM(s) IV Push two times a day    MEDICATIONS  (PRN):  acetaminophen    Suspension 650 milliGRAM(s) Oral every 6 hours PRN Mild pain  morphine  - Injectable 0.5 milliGRAM(s) IV Push every 3 hours PRN mod-severe pain  ondansetron Injectable 4 milliGRAM(s) IV Push every 8 hours PRN Nausea and/or Vomiting      Vital Signs Last 24 Hrs  T(C): 36.1 (2018 01:58), Max: 36.1 (2018 01:58)  T(F): 96.9 (2018 01:58), Max: 96.9 (2018 01:58)  HR: 126 (2018 05:58) (60 - 126)  BP: 94/71 (2018 05:58) (78/57 - 117/74)  BP(mean): --  RR: 22 (2018 05:58) (19 - 22)  SpO2: 100% (2018 05:58) (90% - 100%)    CAPILLARY BLOOD GLUCOSE          I&O's Summary    2018 07:01  -  2018 07:00  --------------------------------------------------------  IN: 1260 mL / OUT: 100 mL / NET: 1160 mL    2018 07:01  -  2018 06:45  --------------------------------------------------------  IN: 630 mL / OUT: 400 mL / NET: 230 mL      PHYSICAL EXAM  GENERAL: NAD, alert, appears chronically ill   HEAD:  Atraumatic, temporal wasting  EYES: EOMI, PERRLA, conjunctiva and sclera clear  NECK: Supple, No JVD  CHEST/LUNG: Improved rales, normal resp effort and rate. 10 second apneic episode  HEART: Irregular rate and rhythm; No murmurs, rubs, or gallops  ABDOMEN: Soft, Nontender, Nondistended; Bowel sounds present, + incontinent of black liquid stool  EXTREMITIES:  2+ Peripheral Pulses, No clubbing, cyanosis. 2+ pitting pretibial edema up to thighs and hips, R arm weeping serosanguinous fluid  NEURO/PSYCH: AAOx1 (self), aphasia, otherwise nonfocal  SKIN: Erythematous, swollen vulva and inguinal region. Lower extremity and sacral edema      LABS:                                   10.0   23.3  )-----------( 91       ( 2018 02:47 )             31.8     CBC Full  -  ( 2018 02:47 )  WBC Count : 23.3 K/uL  Hemoglobin : 10.0 g/dL  Hematocrit : 31.8 %  Platelet Count - Automated : 91 K/uL  Mean Cell Volume : 100.0 fl  Mean Cell Hemoglobin : 31.5 pg  Mean Cell Hemoglobin Concentration : 31.4 gm/dL  Auto Neutrophil # : x  Auto Lymphocyte # : x  Auto Monocyte # : x  Auto Eosinophil # : x  Auto Basophil # : x  Auto Neutrophil % : x  Auto Lymphocyte % : x  Auto Monocyte % : x  Auto Eosinophil % : x  Auto Basophil % : x    06    146<H>  |  117<H>  |  25<H>  ----------------------------<  66<L>  5.3   |  18<L>  |  1.01    Ca    7.5<L>      2018 10:57  Phos  4.1     06-  Mg     2.1     06-05    TPro  4.9<L>  /  Alb  1.4<L>  /  TBili  0.2  /  DBili  x   /  AST  32  /  ALT  14  /  AlkPhos  130<H>  -    Creatinine Trend: 1.01<--, 0.92<--, 0.70<--, 0.78<--, 0.80<--, 0.92<--  LIVER FUNCTIONS - ( 2018 10:57 )  Alb: 1.4 g/dL / Pro: 4.9 g/dL / ALK PHOS: 130 U/L / ALT: 14 U/L / AST: 32 U/L / GGT: x                   Urinalysis Basic - ( 2018 20:10 )    Color: Yellow / Appearance: SL Turbid / S.025 / pH: x  Gluc: x / Ketone: Negative  / Bili: Negative / Urobili: Negative   Blood: x / Protein: 100 mg/dL / Nitrite: Negative   Leuk Esterase: Small / RBC: 0-2 /HPF / WBC 5-10 /HPF   Sq Epi: x / Non Sq Epi: Occasional /HPF / Bacteria: Many /HPF      MICROBIOLOGY:    RADIOLOGY & ADDITIONAL TESTS:     CONSULTS: endocrinology, palliative care Internal Medicine Progress Note    Claire De Paz, PGY1  Internal Medicine, team 1  Pager [phone] / [phone]  After 7PM on weekdays and 12PM on weekends, please page #[phone]    Patient is a 73y old  Female who presents with a chief complaint of Lethargy, abd pain (23 May 2018 16:13)      SUBJECTIVE / OVERNIGHT EVENTS: CBC resulted overnight with Hg 5.2, repeated and was Hg 10. Bladder scan elevated, davidson placed 400 cc UOP. Hypothermia resolved yest afternoon with warming blanket. Hypotension BP 70s and 80s, received LR bolus 250cc x 3. Grimacing with abd palpation, morphine 0.5mg X 1 overnight.    MEDICATIONS  (STANDING):  calcium carbonate 500 mG (Tums) Chewable 1 Tablet(s) Chew once  cefTRIAXone   IVPB 1 Gram(s) IV Intermittent every 24 hours  cefTRIAXone   IVPB      dextrose 5% + lactated ringers. 1000 milliLiter(s) (30 mL/Hr) IV Continuous <Continuous>  digoxin  Injectable 125 MICROGram(s) IV Push daily  levothyroxine Injectable 64 MICROGram(s) IV Push <User Schedule>  melatonin 3 milliGRAM(s) Oral at bedtime  mirtazapine 7.5 milliGRAM(s) Oral daily  nystatin Powder 1 Application(s) Topical two times a day  pantoprazole  Injectable 40 milliGRAM(s) IV Push two times a day    MEDICATIONS  (PRN):  acetaminophen    Suspension 650 milliGRAM(s) Oral every 6 hours PRN Mild pain  morphine  - Injectable 0.5 milliGRAM(s) IV Push every 3 hours PRN mod-severe pain  ondansetron Injectable 4 milliGRAM(s) IV Push every 8 hours PRN Nausea and/or Vomiting      Vital Signs Last 24 Hrs  T(C): 36.1 (2018 01:58), Max: 36.1 (2018 01:58)  T(F): 96.9 (2018 01:58), Max: 96.9 (2018 01:58)  HR: 126 (2018 05:58) (60 - 126)  BP: 94/71 (2018 05:58) (78/57 - 117/74)  BP(mean): --  RR: 22 (2018 05:58) (19 - 22)  SpO2: 100% (2018 05:58) (90% - 100%)    CAPILLARY BLOOD GLUCOSE          I&O's Summary    2018 07:01  -  2018 07:00  --------------------------------------------------------  IN: 1260 mL / OUT: 100 mL / NET: 1160 mL    2018 07:01  -  2018 06:45  --------------------------------------------------------  IN: 630 mL / OUT: 400 mL / NET: 230 mL      PHYSICAL EXAM  GENERAL: NAD, alert, appears chronically ill   HEAD:  Atraumatic, temporal wasting  EYES: EOMI, PERRLA, conjunctiva and sclera clear  NECK: Supple, No JVD  CHEST/LUNG: Improved rales, normal resp effort and rate. 10 second apneic episode  HEART: Irregular rate and rhythm; No murmurs, rubs, or gallops  ABDOMEN: Soft, Nontender, Nondistended; Bowel sounds present, + incontinent of black liquid stool  EXTREMITIES:  2+ Peripheral Pulses, No clubbing, cyanosis. 2+ pitting pretibial edema up to thighs and hips, R arm weeping serosanguinous fluid  NEURO/PSYCH: AAOx1 (self), aphasia, otherwise nonfocal  SKIN: Erythematous, swollen vulva and inguinal region. Lower extremity and sacral edema      LABS:                                   10.0   23.3  )-----------( 91       ( 2018 02:47 )             31.8     CBC Full  -  ( 2018 02:47 )  WBC Count : 23.3 K/uL  Hemoglobin : 10.0 g/dL  Hematocrit : 31.8 %  Platelet Count - Automated : 91 K/uL  Mean Cell Volume : 100.0 fl  Mean Cell Hemoglobin : 31.5 pg  Mean Cell Hemoglobin Concentration : 31.4 gm/dL  Auto Neutrophil # : x  Auto Lymphocyte # : x  Auto Monocyte # : x  Auto Eosinophil # : x  Auto Basophil # : x  Auto Neutrophil % : x  Auto Lymphocyte % : x  Auto Monocyte % : x  Auto Eosinophil % : x  Auto Basophil % : x        146<H>  |  117<H>  |  25<H>  ----------------------------<  66<L>  5.3   |  18<L>  |  1.01    Ca    7.5<L>      2018 10:57  Phos  4.1       Mg     2.1         TPro  4.9<L>  /  Alb  1.4<L>  /  TBili  0.2  /  DBili  x   /  AST  32  /  ALT  14  /  AlkPhos  130<H>      Creatinine Trend: 1.01<--, 0.92<--, 0.70<--, 0.78<--, 0.80<--, 0.92<--  LIVER FUNCTIONS - ( 2018 10:57 )  Alb: 1.4 g/dL / Pro: 4.9 g/dL / ALK PHOS: 130 U/L / ALT: 14 U/L / AST: 32 U/L / GGT: x             Free thyroxine 1.1      Urinalysis Basic - ( 2018 20:10 )    Color: Yellow / Appearance: SL Turbid / S.025 / pH: x  Gluc: x / Ketone: Negative  / Bili: Negative / Urobili: Negative   Blood: x / Protein: 100 mg/dL / Nitrite: Negative   Leuk Esterase: Small / RBC: 0-2 /HPF / WBC 5-10 /HPF   Sq Epi: x / Non Sq Epi: Occasional /HPF / Bacteria: Many /HPF      MICROBIOLOGY:  UCx pending    RADIOLOGY & ADDITIONAL TESTS:     CONSULTS: endocrinology, palliative care Internal Medicine Progress Note    Claire De Paz, PGY1  Internal Medicine, team 1  Pager [phone] / [phone]  After 7PM on weekdays and 12PM on weekends, please page #[phone]    Patient is a 73y old  Female who presents with a chief complaint of Lethargy, abd pain (23 May 2018 16:13)      SUBJECTIVE / OVERNIGHT EVENTS: CBC resulted overnight with Hg 5.2, repeated and was Hg 10. Bladder scan elevated, davidson placed 400 cc UOP. Hypothermia resolved yest afternoon with warming blanket. Hypotension BP 70s and 80s, received LR bolus 250cc x 3. Grimacing with abd palpation, morphine 0.5mg X 1 overnight.    MEDICATIONS  (STANDING):  calcium carbonate 500 mG (Tums) Chewable 1 Tablet(s) Chew once  cefTRIAXone   IVPB 1 Gram(s) IV Intermittent every 24 hours  cefTRIAXone   IVPB      dextrose 5% + lactated ringers. 1000 milliLiter(s) (30 mL/Hr) IV Continuous <Continuous>  digoxin  Injectable 125 MICROGram(s) IV Push daily  levothyroxine Injectable 64 MICROGram(s) IV Push <User Schedule>  melatonin 3 milliGRAM(s) Oral at bedtime  mirtazapine 7.5 milliGRAM(s) Oral daily  nystatin Powder 1 Application(s) Topical two times a day  pantoprazole  Injectable 40 milliGRAM(s) IV Push two times a day    MEDICATIONS  (PRN):  acetaminophen    Suspension 650 milliGRAM(s) Oral every 6 hours PRN Mild pain  morphine  - Injectable 0.5 milliGRAM(s) IV Push every 3 hours PRN mod-severe pain  ondansetron Injectable 4 milliGRAM(s) IV Push every 8 hours PRN Nausea and/or Vomiting      Vital Signs Last 24 Hrs  T(C): 36.1 (06 Jun 2018 01:58), Max: 36.1 (06 Jun 2018 01:58)  T(F): 96.9 (06 Jun 2018 01:58), Max: 96.9 (06 Jun 2018 01:58)  HR: 126 (06 Jun 2018 05:58) (60 - 126)  BP: 94/71 (06 Jun 2018 05:58) (78/57 - 117/74)  BP(mean): --  RR: 22 (06 Jun 2018 05:58) (19 - 22)  SpO2: 100% (06 Jun 2018 05:58) (90% - 100%)    CAPILLARY BLOOD GLUCOSE          I&O's Summary    04 Jun 2018 07:01  -  05 Jun 2018 07:00  --------------------------------------------------------  IN: 1260 mL / OUT: 100 mL / NET: 1160 mL    05 Jun 2018 07:01  -  06 Jun 2018 06:45  --------------------------------------------------------  IN: 630 mL / OUT: 400 mL / NET: 230 mL      PHYSICAL EXAM  GENERAL: NAD, alert, appears chronically ill   HEAD:  Atraumatic, temporal wasting  EYES: EOMI, PERRLA, conjunctiva and sclera clear  NECK: Supple, No JVD  CHEST/LUNG: Improved rales, normal resp effort and rate. 10 second apneic episode  HEART: Irregular rate and rhythm; No murmurs, rubs, or gallops  ABDOMEN: Soft, Nontender, Nondistended; Bowel sounds present  EXTREMITIES:  2+ Peripheral Pulses, No clubbing, cyanosis. 2+ pitting pretibial edema up to thighs and hips, R arm weeping serosanguinous fluid  NEURO/PSYCH: AAOx 0, aphasia, lethargic  SKIN: Erythematous, swollen vulva and inguinal region. Lower extremity and sacral edema      LABS:                                  10.0   23.3  )-----------( 91       ( 06 Jun 2018 02:47 )             31.8     CBC Full  -  ( 06 Jun 2018 02:47 )  WBC Count : 23.3 K/uL  Hemoglobin : 10.0 g/dL  Hematocrit : 31.8 %  Platelet Count - Automated : 91 K/uL  Mean Cell Volume : 100.0 fl  Mean Cell Hemoglobin : 31.5 pg  Mean Cell Hemoglobin Concentration : 31.4 gm/dL  Auto Neutrophil # : 20.4 K/uL  Auto Lymphocyte # : 2.2 K/uL  Auto Monocyte # : 0.7 K/uL  Auto Eosinophil # : 0.0 K/uL  Auto Basophil # : 0.0 K/uL  Auto Neutrophil % : 87.4 %  Auto Lymphocyte % : 9.5 %  Auto Monocyte % : 2.9 %  Auto Eosinophil % : 0.2 %  Auto Basophil % : 0.0 %    06-06    145  |  115<H>  |  26<H>  ----------------------------<  77  4.5   |  19<L>  |  1.10    Ca    7.8<L>      06 Jun 2018 13:57  Phos  4.1     06-05  Mg     2.1     06-05    TPro  4.9<L>  /  Alb  1.4<L>  /  TBili  0.2  /  DBili  x   /  AST  32  /  ALT  14  /  AlkPhos  130<H>  06-05    Creatinine Trend: 1.10<--, 1.01<--, 0.92<--, 0.70<--, 0.78<--, 0.80<--  LIVER FUNCTIONS - ( 05 Jun 2018 10:57 )  Alb: 1.4 g/dL / Pro: 4.9 g/dL / ALK PHOS: 130 U/L / ALT: 14 U/L / AST: 32 U/L / GGT: x               MICROBIOLOGY:    Culture - Urine (collected 05 Jun 2018 10:03)  Source: .Urine Catheterized  Final Report (06 Jun 2018 09:21):    Culture grew 3 or more types of organisms which indicate    collection contamination; consider recollection only if clinically    indicated.      RADIOLOGY & ADDITIONAL TESTS:     CONSULTS: endocrinology, palliative care

## 2018-06-06 NOTE — PROGRESS NOTE ADULT - SUBJECTIVE AND OBJECTIVE BOX
Chief Complaint: Evaluating this 74 y/o for hypothyroidism      Interval History: Currently being placed on an EEG. Still with confusion. Free T4 now normalized.     MEDICATIONS  (STANDING):  calcium carbonate 500 mG (Tums) Chewable 1 Tablet(s) Chew once  dextrose 5% + lactated ringers. 1000 milliLiter(s) (30 mL/Hr) IV Continuous <Continuous>  digoxin  Injectable 125 MICROGram(s) IV Push daily  levothyroxine Injectable 64 MICROGram(s) IV Push <User Schedule>  melatonin 3 milliGRAM(s) Oral at bedtime  methylPREDNISolone sodium succinate Injectable 60 milliGRAM(s) IV Push daily  mirtazapine 7.5 milliGRAM(s) Oral daily  nystatin Powder 1 Application(s) Topical two times a day  pantoprazole  Injectable 40 milliGRAM(s) IV Push two times a day  piperacillin/tazobactam IVPB. 3.375 Gram(s) IV Intermittent every 8 hours    MEDICATIONS  (PRN):  acetaminophen    Suspension 650 milliGRAM(s) Oral every 6 hours PRN Mild pain  morphine  - Injectable 0.5 milliGRAM(s) IV Push every 3 hours PRN mod-severe pain  ondansetron Injectable 4 milliGRAM(s) IV Push every 8 hours PRN Nausea and/or Vomiting      Allergies    No Known Allergies    Intolerances      Review of Systems: Unable to obtain at this time.       PHYSICAL EXAM:  VITALS: T(C): 35 (06-06-18 @ 13:27)  T(F): 95 (06-06-18 @ 13:27), Max: 97 (06-06-18 @ 11:02)  HR: 85 (06-06-18 @ 14:19) (65 - 126)  BP: 99/68 (06-06-18 @ 13:27) (78/57 - 132/82)  RR:  (20 - 22)  SpO2:  (90% - 100%)  Wt(kg): --  GENERAL: slightly agitated and confused  EYES: EOMI, No proptosis  HEENT:  dry mucous membranes  RESPIRATORY: Clear to auscultation bilaterally, full excursion, non labored  CARDIOVASCULAR: Regular rhythm; normal S1/S2, no peripheral edema  GI: Soft, nontender, non distended, normal bowel sounds  SKIN: Warm and dry  PSYCH: AOX0            06-06    145  |  115<H>  |  26<H>  ----------------------------<  77  4.5   |  19<L>  |  1.10    EGFR if : 57<L>  EGFR if non : 49<L>    Ca    7.8<L>      06-06  Mg     2.1     06-05  Phos  4.1     06-05    TPro  4.9<L>  /  Alb  1.4<L>  /  TBili  0.2  /  DBili  x   /  AST  32  /  ALT  14  /  AlkPhos  130<H>  06-05        Thyroid Function Tests:  06-05 @ 14:49 TSH -- FreeT4 1.1 T3 -- Anti TPO -- Anti Thyroglobulin Ab -- TSI --  05-29 @ 16:05 TSH -- FreeT4 0.3 T3 -- Anti TPO -- Anti Thyroglobulin Ab -- TSI --

## 2018-06-06 NOTE — PROVIDER CONTACT NOTE (OTHER) - REASON
Pt became no responsive for 20 seconds after having bowel movement face pale pupils constricted Pt also had jerk movements during this time

## 2018-06-06 NOTE — PROGRESS NOTE ADULT - PROBLEM SELECTOR PLAN 10
DVT: No pharmacologic 2/2 GIB, SCDs  Diet: mechanical soft  Dispo: Transfer to [facility] placed by HCP for palliative RT, comfort measures  Atrial fibrillation: -lopressor IV and digoxin IV, dig level wnl 5/29. No AC 2/2 GIB. Monitor off tele

## 2018-06-06 NOTE — CONSULT NOTE ADULT - SUBJECTIVE AND OBJECTIVE BOX
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 During this episode pt passed a large melanic bowel movement, attributed t syncopal episode. Pt reported to have several 20 second episodes of unresponsiveness, eye rolling and then abnormal arm posturing since then, occurring a few times a day. Pt has had a rapid decline of there past 4 months.     Allergies    No Known Allergies    Intolerances      Vital Signs Last 24 Hrs  T(C): 35.7 (06 Jun 2018 17:00), Max: 36.1 (06 Jun 2018 01:58)  T(F): 96.3 (06 Jun 2018 17:00), Max: 97 (06 Jun 2018 11:02)  HR: 130 (06 Jun 2018 17:50) (65 - 130)  BP: 80/51 (06 Jun 2018 17:50) (78/57 - 132/82)  BP(mean): --  RR: 22 (06 Jun 2018 17:50) (20 - 22)  SpO2: 98% (06 Jun 2018 17:50) (90% - 100%)    Neuro exam  MS - Pt is awake and alert, perserverating, speech non fluent, not following commmands, agitated  CNS - eomi, perll, face grossly symmetric Neurology Consult Note     "Pt is a 72 yo F PMH of Afib (not on AC 2/2 GIB), 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, 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 During this episode pt passed a large melanic bowel movement, attributed t syncopal episode. Pt reported to have several 20 second episodes of unresponsiveness, eye rolling and then abnormal arm posturing since then, occurring a few times a day. Pt has had a rapid decline of there past 4 months.     Allergies    No Known Allergies    Intolerances    Vital Signs Last 24 Hrs  T(C): 35.7 (06 Jun 2018 17:00), Max: 36.1 (06 Jun 2018 01:58)  T(F): 96.3 (06 Jun 2018 17:00), Max: 97 (06 Jun 2018 11:02)  HR: 130 (06 Jun 2018 17:50) (65 - 130)  BP: 80/51 (06 Jun 2018 17:50) (78/57 - 132/82)  BP(mean): --  RR: 22 (06 Jun 2018 17:50) (20 - 22)  SpO2: 98% (06 Jun 2018 17:50) (90% - 100%)    Neuro exam  MS - Pt is awake and alert, perseverating speech non fluent, not following commands agitated  CNS - eomi, perrl, face grossly symmetric   Motor - moving upper extremities b/l, not moving lower extremities b/l, pt has diffuse lower extremity edema  Sensory - light touch grossly in tact throughout, grimaces to painful stimuli in the lower extremities b/l  Coordination and gait deferred  Reflexes - downgoing babinski's b/l, normoreflexic equal throughout, ankle jerks not elicities    Labs                        10.0   23.3  )-----------( 91       ( 06 Jun 2018 02:47 )             31.8   06-06    145  |  115<H>  |  26<H>  ----------------------------<  77  4.5   |  19<L>  |  1.10    Ca    7.8<L>      06 Jun 2018 13:57  Phos  4.1     06-05  Mg     2.1     06-05    TPro  4.9<L>  /  Alb  1.4<L>  /  TBili  0.2  /  DBili  x   /  AST  32  /  ALT  14  /  AlkPhos  130<H>  06-05    CT head pending  EEG Summary/Classification:  Abnormal EEG study in the awake, drowsy, and asleep states.  1.	asystolic events consistent with cardiac syncope  2.	sharp waves, multifocal, mainly L parietal, also R parietal, R frontal  3.	background slowing  4.	disorganization    EEG Impression/Clinical Correlate:  Abnormal Study.  1.	Cardiogenic syncope with recurrent asystole – as described.   2.	Potential epileptogenic focus in the left parietal region and to a lesser degree in the right parietal and right frontal regions. No electrographic seizures recorded.   3.	Moderate diffuse cerebral dysfunction.

## 2018-06-06 NOTE — PROGRESS NOTE ADULT - PROBLEM SELECTOR PLAN 9
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 No current treatment this admission. Discussed care with [place] [facility]. Possible RT but can be pursued as an outpatient. At this time no further treatment planned given poor performance status. Ongoing C discussions with HCP regarding dispo. She has been living with children prior to admission.  -patient is comfort measures  -Transfer to [facility] for [place]  -morphine 0.5mg IV q3 prn

## 2018-06-06 NOTE — PROGRESS NOTE ADULT - PROBLEM SELECTOR PLAN 6
Improved leukocytosis 28 on admission. Tachycardia on admission 2/2 afib with RVR. Unclear whether presence of SIRs is reactive 2/2 malignancy and blood loss vs infectious. Compressive atelectasis on CT, but cannot r/o pneumonia. Per son who is physician, pleural effusion is chronic and has been tapped in past. No other concerning signs for pneumonia at this time. Other possible source of infection was intraabdominal.  -s/p zosyn 5-d course to broadly cover infection  -BCx no growth to date. C diff negative x 2. Pt is incontinent and edema makes straight cath difficult, but denies dysuria  -Procalcitonin 0.46 indeterminate  -Patient and family would like to pursue comfort measures (antibiotics were discontinued on 5/26)  -low suspicion for active infection at this time  - f/u repeat procalcitonin Leukocytosis 28 on admission. Tachycardia on admission 2/2 afib with RVR. Unclear whether presence of SIRs is reactive 2/2 malignancy and blood loss vs infectious. Compressive atelectasis on CT, but cannot r/o pneumonia. Per son who is physician, pleural effusion is chronic and has been tapped in past. No other concerning signs for pneumonia at this time. Other possible source of infection was intraabdominal.  -s/p zosyn 5-d course  -BCx no growth to date. C diff negative x 2. Pt is incontinent and edema makes straight cath difficult, but denies dysuria  -Procalcitonin 0.46 indeterminate, repeat lower  -Patient and family would like to pursue comfort measures (antibiotics were discontinued on 5/26)  -Repeat BCx sent, broaden CTX to zosyn with uptrending leukocytosis and hypothermia

## 2018-06-06 NOTE — PROGRESS NOTE ADULT - ATTENDING COMMENTS
extended discussion w/ HCP Dr Reymundo Rivera. He is concerned re: his mothers deterioration over the past few days, feels that__ extended discussion w/ HCP Dr Reymundo Rivera. He is concerned re: his mothers deterioration over the past few days, feels that autoimmune encephalitis is on the differential given her neurologic symptoms and hx of immunotherapy. discussed w/ [facility] oncologist dr shirley and she feels that although a possibility, it is unlikely since her immunotherapy was 6 months ago. HCP aware that giving empiric steroids without neuro imaging/LP has substantial risks but he is aware that it is a "hail wilbert" and his mom is "dying whether we do it or not". can try 2 doses of iv steroids although may be of very minimal benefit, and includes risk, HCP is aware. neuro consult and EEG obtained, EEG shows asystolic episodes causing EEG voltage suppression. ekg performed at bedside showed afib w/ low voltage hr ~90-100s, no obvious conduction abnormalities. will dc digoxin, can check level. son does not feel telemetry monitoring nor cardiology input would be of additional benefit as he is aware her prognosis is very poor. HCP asked re: if pt would be candidate for inpt hospice given new findings, discussed we will reach out to palliative care team re: possible PCU. Daughter at bedside as well. Asked HCP if he would like us to update his brother re: their mothers clinical status, he states he will do so.

## 2018-06-06 NOTE — EEG REPORT - NS EEG TEXT BOX
[facility]  Report of Routine EEG with Video    [facility]: [address], [address], [place] [address], Phone: [phone]  [facility]: [address], [place] [address], Phone: [phone]  Office: [address], [address], [place] [address], Phone: [phone]    Patient Name: Clari Rivera    Age: 73 y  : 1945  Patient ID: -, MRN #: MR# [id number], Location: 2MON [address]  Referring Physician: -    EEG #: [id number]  Study Date: 2018		    Technical Information:					  On Instrument: -  Placement and Labeling of Electrodes:  The EEG was performed utilizing 20 channels referential EEG connections (coronal over temporal over parasagittal montage) using all standard 10-20 electrode placements with EKG.  Recording was at a sampling rate of 256 samples per second per channel.  Time synchronized digital video recording was done simultaneously with EEG recording.  A low light infrared camera was used for low light recording.  Ko and seizure detection algorithms were utilized.    History:  P/w Lethargy, abd pain, 10 second episodes of arms shaking and apnea, unresponsive, jerking movements.    H/o Afib, metastatic sarcoma, stroke, hypothyroid    Medication	  No Data.	    Study Interpretation:    FINDINGS:  The background was continuous, spontaneously variable. The background consists of predominantly delta activity diffuse with some intermixed theta.      Background Slowing:  Diffuse theta and polymorphic delta slowing.    Focal Slowing:   None was present.    Sleep Background:  Drowsiness and stage II sleep transients were not recorded.    Other Non-Epileptiform Findings:  None were present.    Interictal Epileptiform Activity:   There are frequent sharp waves in the left parietal region, occasional independent sharp waves in the right parietal region and rare right frontal sharp waves.     Events:  Clinical events: There are two events where the cardiac rhythm slows and disappears associated with diffuse attenuation of the EEG consistent with cardiac syncope.    Seizures: None recorded.    Activation Procedures:   Hyperventilation was not performed.    Photic stimulation was not performed.      Artifacts:  Intermittent myogenic and movement artifacts were noted.    ECG:  The heart rate on single channel ECG showed   Baseline: irregularly irregular QRS at  bpm.   16:43:56 HR slowed to 60 bpm  16:44:07 25 bpm  16:44:14 asystole  16:44:36 EEG voltage suppression  16:44:45 QRS recovery  16:44:45 EEG recovery    A second asystolic event occurred during the recording at 16:46:55.     EEG Summary/Classification:  Abnormal EEG study in the awake, drowsy, and asleep states.  1.	asystolic events consistent with cardiac syncope  2.	sharp waves, multifocal, mainly L parietal, also R parietal, R frontal  3.	background slowing  4.	disorganization    EEG Impression/Clinical Correlate:  Abnormal Study.  1.	Cardiogenic syncope with recurrent asystole – as described.   2.	Potential epileptogenic focus in the left parietal region and to a lesser degree in the right parietal and right frontal regions. No electrographic seizures recorded.   3.	Moderate diffuse cerebral dysfunction.    ________________________________________    Camron Mata MD  Attending Physician, [facility] [place]

## 2018-06-06 NOTE — PROGRESS NOTE ADULT - PROBLEM SELECTOR PLAN 4
[facility] readdressed with pt's children. Pt's son Dr. Reymundo Rivera is the HCP (documentation in chart). Comfort measures  -Blood draws every other day. IVF, blood transfusions acceptable. No central lines, pressors, scope  -Palliative care consulted, HCP pursing [place] to [facility] [place]. Discussed with son Dr. Jann Rivera and daughter and they are open to inpatient hospice.  -Hypoglycemia from poor PO intake, switch D5 in LR  -Palliative care in contact with HCP

## 2018-06-06 NOTE — CONSULT NOTE ADULT - ASSESSMENT
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 Neurology was consulted for seizure like activity. First episode noted to be on Saturday (6/2/18) in which pt had eye rolling and unresponsiveness. During this episode pt passed a large melanic bowel movement, attributed t syncopal episode. Pt reported to have several 20 second episodes of unresponsiveness, eye rolling and then abnormal arm posturing since then, occurring a few times a day.     Impression - Seizure like activity related to cardiogenic syncope with recurrent asystole. Also, potential epileptogenic focus in the left parietal region and right parietal / right frontal region to a lesser degree. Moderate diffuse cerebral dysfunction    Reccs:  Stat telemetry if son agrees  CT head when primary team deems stable  Recommend immediate Cardiology Consult if son agrees   Pt is at significant cardiac risk and likely why she is having these episodes of unresponsiveness.    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 Neurology was consulted for seizure like activity. First episode noted to be on Saturday (6/2/18) in which pt had eye rolling and unresponsiveness. During this episode pt passed a large melanic bowel movement, attributed t syncopal episode. Pt reported to have several 20 second episodes of unresponsiveness, eye rolling and then abnormal arm posturing since then, occurring a few times a day.     Impression - Seizure like activity related to cardiogenic syncope with recurrent asystole. Also, potential epileptogenic focus in the left parietal region and right parietal / right frontal region to a lesser degree. Moderate diffuse cerebral dysfunction    Reccs:  Stat telemetry if son agrees  CT head when primary team deems stable  Recommend immediate Cardiology Consult if son agrees   Pt is at significant cardiac risk and likely why she is having these episodes of unresponsiveness.  Start Keppra 500mg BID, vEEG (rEEG done)    Above discussed with primary team, pt's son at bedside and attending on call. 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 Neurology was consulted for seizure like activity. First episode noted to be on Saturday (6/2/18) in which pt had eye rolling and unresponsiveness. During this episode pt passed a large melanic bowel movement, attributed t syncopal episode. Pt reported to have several 20 second episodes of unresponsiveness, eye rolling and then abnormal arm posturing since then, occurring a few times a day.     Impression - Seizure like activity related to cardiogenic syncope with recurrent asystole seen on EEG. Also, potential epileptogenic focus in the left parietal region and right parietal / right frontal region to a lesser degree. Moderate diffuse cerebral dysfunction    Reccs:  Stat telemetry if son agrees  CT head when primary team deems stable  Recommend immediate Cardiology Consult if son agrees   Pt is at significant cardiac risk and likely why she is having these episodes of unresponsiveness.  Start Keppra 500mg BID, vEEG (rEEG done)    Above discussed with primary team, pt's son at bedside and attending on call. 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 Neurology was consulted for seizure like activity. First episode noted to be on Saturday (6/2/18) in which pt had eye rolling and unresponsiveness. During this episode pt passed a large melanic bowel movement, attributed t syncopal episode. Pt reported to have several 20 second episodes of unresponsiveness, eye rolling and then abnormal arm posturing since then, occurring a few times a day.     Impression - Seizure like activity related to cardiogenic syncope with recurrent asystole seen on EEG. Also, potential epileptogenic focus in the left parietal region and right parietal / right frontal region to a lesser degree. Moderate diffuse cerebral dysfunction    Reccs:  Stat telemetry if son agrees  CT head when primary team deems stable  Recommend immediate Cardiology Consult if son agrees   Pt is at significant cardiac risk and likely why she is having these episodes of unresponsiveness.  Above discussed with primary team, pt's son at bedside and attending on call.

## 2018-06-06 NOTE — CONSULT NOTE ADULT - ATTENDING COMMENTS
Patient was personally seen and examined.  Episodes secondary to asystole and not likely seizure activity.    Of note, sharp-waves found on EEG testing likely from history of stroke and prior cortical irritability/injury.      Since events not seizure, no true need for AEDs at this time.  However, if definitive seizure activity, low threshold to use Keppra in future.

## 2018-06-06 NOTE — PROGRESS NOTE ADULT - PROBLEM SELECTOR PLAN 1
Repeat Free T4 now normalized on current dose of IV levothyroxine. Recommended continue 64 IV daily for improved absorption while hospitalized. Can repeat TSH and Free T4 in 4-6 weeks as outpatient. Discharge on 88 mcg po daily. Will sign off at this time. Please reconsult if needed.

## 2018-06-07 DIAGNOSIS — R55 SYNCOPE AND COLLAPSE: ICD-10-CM

## 2018-06-07 DIAGNOSIS — Z71.89 OTHER SPECIFIED COUNSELING: ICD-10-CM

## 2018-06-07 LAB
CULTURE RESULTS: SIGNIFICANT CHANGE UP
SPECIMEN SOURCE: SIGNIFICANT CHANGE UP

## 2018-06-07 PROCEDURE — 95819 EEG AWAKE AND ASLEEP: CPT | Mod: 26

## 2018-06-07 PROCEDURE — 99233 SBSQ HOSP IP/OBS HIGH 50: CPT | Mod: GC

## 2018-06-07 PROCEDURE — 99223 1ST HOSP IP/OBS HIGH 75: CPT

## 2018-06-07 PROCEDURE — 99497 ADVNCD CARE PLAN 30 MIN: CPT

## 2018-06-07 PROCEDURE — 99233 SBSQ HOSP IP/OBS HIGH 50: CPT

## 2018-06-07 RX ORDER — HYDROMORPHONE HYDROCHLORIDE 2 MG/ML
0.2 INJECTION INTRAMUSCULAR; INTRAVENOUS; SUBCUTANEOUS EVERY 6 HOURS
Qty: 0 | Refills: 0 | Status: DISCONTINUED | OUTPATIENT
Start: 2018-06-07 | End: 2018-06-09

## 2018-06-07 RX ORDER — HYDROMORPHONE HYDROCHLORIDE 2 MG/ML
0.2 INJECTION INTRAMUSCULAR; INTRAVENOUS; SUBCUTANEOUS
Qty: 0 | Refills: 0 | Status: DISCONTINUED | OUTPATIENT
Start: 2018-06-07 | End: 2018-06-09

## 2018-06-07 RX ORDER — IPRATROPIUM/ALBUTEROL SULFATE 18-103MCG
3 AEROSOL WITH ADAPTER (GRAM) INHALATION EVERY 6 HOURS
Qty: 0 | Refills: 0 | Status: DISCONTINUED | OUTPATIENT
Start: 2018-06-07 | End: 2018-06-09

## 2018-06-07 RX ADMIN — HYDROMORPHONE HYDROCHLORIDE 0.2 MILLIGRAM(S): 2 INJECTION INTRAMUSCULAR; INTRAVENOUS; SUBCUTANEOUS at 13:50

## 2018-06-07 RX ADMIN — HYDROMORPHONE HYDROCHLORIDE 0.2 MILLIGRAM(S): 2 INJECTION INTRAMUSCULAR; INTRAVENOUS; SUBCUTANEOUS at 16:40

## 2018-06-07 RX ADMIN — NYSTATIN CREAM 1 APPLICATION(S): 100000 CREAM TOPICAL at 05:26

## 2018-06-07 RX ADMIN — HYDROMORPHONE HYDROCHLORIDE 0.2 MILLIGRAM(S): 2 INJECTION INTRAMUSCULAR; INTRAVENOUS; SUBCUTANEOUS at 18:15

## 2018-06-07 RX ADMIN — Medication 64 MICROGRAM(S): at 05:26

## 2018-06-07 RX ADMIN — HYDROMORPHONE HYDROCHLORIDE 0.2 MILLIGRAM(S): 2 INJECTION INTRAMUSCULAR; INTRAVENOUS; SUBCUTANEOUS at 17:53

## 2018-06-07 RX ADMIN — PIPERACILLIN AND TAZOBACTAM 25 GRAM(S): 4; .5 INJECTION, POWDER, LYOPHILIZED, FOR SOLUTION INTRAVENOUS at 21:32

## 2018-06-07 RX ADMIN — MORPHINE SULFATE 0.5 MILLIGRAM(S): 50 CAPSULE, EXTENDED RELEASE ORAL at 06:21

## 2018-06-07 RX ADMIN — PANTOPRAZOLE SODIUM 40 MILLIGRAM(S): 20 TABLET, DELAYED RELEASE ORAL at 05:26

## 2018-06-07 RX ADMIN — NYSTATIN CREAM 1 APPLICATION(S): 100000 CREAM TOPICAL at 17:53

## 2018-06-07 RX ADMIN — MORPHINE SULFATE 0.5 MILLIGRAM(S): 50 CAPSULE, EXTENDED RELEASE ORAL at 06:51

## 2018-06-07 RX ADMIN — PANTOPRAZOLE SODIUM 40 MILLIGRAM(S): 20 TABLET, DELAYED RELEASE ORAL at 17:53

## 2018-06-07 RX ADMIN — SODIUM CHLORIDE 30 MILLILITER(S): 9 INJECTION, SOLUTION INTRAVENOUS at 11:20

## 2018-06-07 RX ADMIN — HYDROMORPHONE HYDROCHLORIDE 0.2 MILLIGRAM(S): 2 INJECTION INTRAMUSCULAR; INTRAVENOUS; SUBCUTANEOUS at 14:20

## 2018-06-07 RX ADMIN — PIPERACILLIN AND TAZOBACTAM 25 GRAM(S): 4; .5 INJECTION, POWDER, LYOPHILIZED, FOR SOLUTION INTRAVENOUS at 05:26

## 2018-06-07 RX ADMIN — PIPERACILLIN AND TAZOBACTAM 25 GRAM(S): 4; .5 INJECTION, POWDER, LYOPHILIZED, FOR SOLUTION INTRAVENOUS at 13:50

## 2018-06-07 RX ADMIN — MORPHINE SULFATE 0.5 MILLIGRAM(S): 50 CAPSULE, EXTENDED RELEASE ORAL at 11:50

## 2018-06-07 RX ADMIN — MORPHINE SULFATE 0.5 MILLIGRAM(S): 50 CAPSULE, EXTENDED RELEASE ORAL at 11:20

## 2018-06-07 RX ADMIN — HYDROMORPHONE HYDROCHLORIDE 0.2 MILLIGRAM(S): 2 INJECTION INTRAMUSCULAR; INTRAVENOUS; SUBCUTANEOUS at 16:10

## 2018-06-07 RX ADMIN — Medication 60 MILLIGRAM(S): at 21:32

## 2018-06-07 NOTE — PROGRESS NOTE ADULT - SUBJECTIVE AND OBJECTIVE BOX
Internal Medicine Progress Note    Claire De Paz, PGY1  Internal Medicine, team 1  Pager [phone] / [phone]  After 7PM on weekdays and 12PM on weekends, please page #[phone]    Patient is a 73y old  Female who presents with a chief complaint of Lethargy, abd pain (23 May 2018 16:13)      SUBJECTIVE / OVERNIGHT EVENTS: Hypothermia resolved with warming blanket yesterday. BP low 80/51, given 250cc LR. EEG showed cardiogenic syncope. HCP Dr. Reymundo Rivera at bedside does not feel utility in placing pt on tele or calling cardiology. Bedboarded for PCU. More altered, d/c PO meds    MEDICATIONS  (STANDING):  calcium carbonate 500 mG (Tums) Chewable 1 Tablet(s) Chew once  dextrose 5% + sodium chloride 0.45%. 1000 milliLiter(s) (30 mL/Hr) IV Continuous <Continuous>  levothyroxine Injectable 64 MICROGram(s) IV Push <User Schedule>  nystatin Powder 1 Application(s) Topical two times a day  pantoprazole  Injectable 40 milliGRAM(s) IV Push two times a day  piperacillin/tazobactam IVPB. 3.375 Gram(s) IV Intermittent every 8 hours    MEDICATIONS  (PRN):  acetaminophen    Suspension 650 milliGRAM(s) Oral every 6 hours PRN Mild pain  morphine  - Injectable 0.5 milliGRAM(s) IV Push every 2 hours PRN Breakthrough pain refractory to prn dose  morphine  - Injectable 0.5 milliGRAM(s) IV Push every 3 hours PRN mod-severe pain  ondansetron Injectable 4 milliGRAM(s) IV Push every 8 hours PRN Nausea and/or Vomiting      Vital Signs Last 24 Hrs  T(C): 35.6 (07 Jun 2018 05:40), Max: 36.1 (06 Jun 2018 11:02)  T(F): 96 (07 Jun 2018 05:40), Max: 97 (06 Jun 2018 11:02)  HR: 114 (07 Jun 2018 05:40) (65 - 130)  BP: 122/83 (07 Jun 2018 05:40) (80/51 - 132/82)  BP(mean): --  RR: 22 (07 Jun 2018 05:40) (22 - 22)  SpO2: 100% (07 Jun 2018 05:40) (98% - 100%)    CAPILLARY BLOOD GLUCOSE          I&O's Summary    06 Jun 2018 07:01  -  07 Jun 2018 07:00  --------------------------------------------------------  IN: 890 mL / OUT: 255 mL / NET: 635 mL      PHYSICAL EXAM  GENERAL: NAD, alert, appears chronically ill   HEAD:  Atraumatic, temporal wasting  EYES: EOMI, PERRLA, conjunctiva and sclera clear  NECK: Supple, No JVD  CHEST/LUNG: Improved rales, normal resp effort and rate. 10 second apneic episode  HEART: Irregular rate and rhythm; No murmurs, rubs, or gallops  ABDOMEN: Soft, Nontender, Nondistended; Bowel sounds present  EXTREMITIES:  2+ Peripheral Pulses, No clubbing, cyanosis. 2+ pitting pretibial edema up to thighs and hips, R arm weeping serosanguinous fluid  NEURO/PSYCH: AAOx 0, aphasia, lethargic  SKIN: Erythematous, swollen vulva and inguinal region. Lower extremity and sacral edema      LABS:                             10.0   23.3  )-----------( 91       ( 06 Jun 2018 02:47 )             31.8     CBC Full  -  ( 06 Jun 2018 02:47 )  WBC Count : 23.3 K/uL  Hemoglobin : 10.0 g/dL  Hematocrit : 31.8 %  Platelet Count - Automated : 91 K/uL  Mean Cell Volume : 100.0 fl  Mean Cell Hemoglobin : 31.5 pg  Mean Cell Hemoglobin Concentration : 31.4 gm/dL  Auto Neutrophil # : 20.4 K/uL  Auto Lymphocyte # : 2.2 K/uL  Auto Monocyte # : 0.7 K/uL  Auto Eosinophil # : 0.0 K/uL  Auto Basophil # : 0.0 K/uL  Auto Neutrophil % : 87.4 %  Auto Lymphocyte % : 9.5 %  Auto Monocyte % : 2.9 %  Auto Eosinophil % : 0.2 %  Auto Basophil % : 0.0 %    06-06    145  |  115<H>  |  26<H>  ----------------------------<  77  4.5   |  19<L>  |  1.10    Ca    7.8<L>      06 Jun 2018 13:57  Phos  4.1     06-05  Mg     2.1     06-05    TPro  4.9<L>  /  Alb  1.4<L>  /  TBili  0.2  /  DBili  x   /  AST  32  /  ALT  14  /  AlkPhos  130<H>  06-05    Creatinine Trend: 1.10<--, 1.01<--, 0.92<--, 0.70<--, 0.78<--, 0.80<--  LIVER FUNCTIONS - ( 05 Jun 2018 10:57 )  Alb: 1.4 g/dL / Pro: 4.9 g/dL / ALK PHOS: 130 U/L / ALT: 14 U/L / AST: 32 U/L / GGT: x               MICROBIOLOGY:    Culture - Urine (collected 05 Jun 2018 10:03)  Source: .Urine Catheterized  Final Report (06 Jun 2018 09:21):    Culture grew 3 or more types of organisms which indicate    collection contamination; consider recollection only if clinically    indicated.      RADIOLOGY & ADDITIONAL TESTS:     CONSULTS: endocrinology, palliative care Internal Medicine Progress Note    Claire De Paz, PGY1  Internal Medicine, team 1  Pager [phone] / [phone]  After 7PM on weekdays and 12PM on weekends, please page #[phone]    Patient is a 73y old  Female who presents with a chief complaint of Lethargy, abd pain (23 May 2018 16:13)      SUBJECTIVE / OVERNIGHT EVENTS: Hypothermia resolved with warming blanket yesterday. BP low 80/51, given 250cc LR. Neurology consulted. Spot EEG showed cardiogenic syncope. HCP Dr. Reymundo Rivera at bedside does not feel utility in placing pt on tele or calling cardiology. Bed boarded for PCU. More altered, d/c PO meds. Daughter at bedside. Too unstable to go down for CT yesterday.    MEDICATIONS  (STANDING):  calcium carbonate 500 mG (Tums) Chewable 1 Tablet(s) Chew once  dextrose 5% + sodium chloride 0.45%. 1000 milliLiter(s) (30 mL/Hr) IV Continuous <Continuous>  levothyroxine Injectable 64 MICROGram(s) IV Push <User Schedule>  nystatin Powder 1 Application(s) Topical two times a day  pantoprazole  Injectable 40 milliGRAM(s) IV Push two times a day  piperacillin/tazobactam IVPB. 3.375 Gram(s) IV Intermittent every 8 hours    MEDICATIONS  (PRN):  acetaminophen    Suspension 650 milliGRAM(s) Oral every 6 hours PRN Mild pain  morphine  - Injectable 0.5 milliGRAM(s) IV Push every 2 hours PRN Breakthrough pain refractory to prn dose  morphine  - Injectable 0.5 milliGRAM(s) IV Push every 3 hours PRN mod-severe pain  ondansetron Injectable 4 milliGRAM(s) IV Push every 8 hours PRN Nausea and/or Vomiting      Vital Signs Last 24 Hrs  T(C): 35.6 (07 Jun 2018 05:40), Max: 36.1 (06 Jun 2018 11:02)  T(F): 96 (07 Jun 2018 05:40), Max: 97 (06 Jun 2018 11:02)  HR: 114 (07 Jun 2018 05:40) (65 - 130)  BP: 122/83 (07 Jun 2018 05:40) (80/51 - 132/82)  BP(mean): --  RR: 22 (07 Jun 2018 05:40) (22 - 22)  SpO2: 100% (07 Jun 2018 05:40) (98% - 100%)    CAPILLARY BLOOD GLUCOSE          I&O's Summary    06 Jun 2018 07:01  -  07 Jun 2018 07:00  --------------------------------------------------------  IN: 890 mL / OUT: 255 mL / NET: 635 mL      PHYSICAL EXAM  GENERAL: NAD, alert, appears chronically ill   HEAD:  Atraumatic, temporal wasting  EYES: EOMI, PERRLA, conjunctiva and sclera clear  NECK: Supple, No JVD  CHEST/LUNG: Improved rales, normal resp effort and rate. 10 second apneic episode  HEART: Irregular rate and rhythm; No murmurs, rubs, or gallops  ABDOMEN: Soft, Nontender, Nondistended; Bowel sounds present  EXTREMITIES:  2+ Peripheral Pulses, No clubbing, cyanosis. 2+ pitting pretibial edema up to thighs and hips, R arm weeping serosanguinous fluid  NEURO/PSYCH: AAOx 0, aphasia, lethargic  SKIN: Erythematous, swollen vulva and inguinal region. Lower extremity and sacral edema      LABS:                             10.0   23.3  )-----------( 91       ( 06 Jun 2018 02:47 )             31.8     CBC Full  -  ( 06 Jun 2018 02:47 )  WBC Count : 23.3 K/uL  Hemoglobin : 10.0 g/dL  Hematocrit : 31.8 %  Platelet Count - Automated : 91 K/uL  Mean Cell Volume : 100.0 fl  Mean Cell Hemoglobin : 31.5 pg  Mean Cell Hemoglobin Concentration : 31.4 gm/dL  Auto Neutrophil # : 20.4 K/uL  Auto Lymphocyte # : 2.2 K/uL  Auto Monocyte # : 0.7 K/uL  Auto Eosinophil # : 0.0 K/uL  Auto Basophil # : 0.0 K/uL  Auto Neutrophil % : 87.4 %  Auto Lymphocyte % : 9.5 %  Auto Monocyte % : 2.9 %  Auto Eosinophil % : 0.2 %  Auto Basophil % : 0.0 %    06-06    145  |  115<H>  |  26<H>  ----------------------------<  77  4.5   |  19<L>  |  1.10    Ca    7.8<L>      06 Jun 2018 13:57  Phos  4.1     06-05  Mg     2.1     06-05    TPro  4.9<L>  /  Alb  1.4<L>  /  TBili  0.2  /  DBili  x   /  AST  32  /  ALT  14  /  AlkPhos  130<H>  06-05    Creatinine Trend: 1.10<--, 1.01<--, 0.92<--, 0.70<--, 0.78<--, 0.80<--  LIVER FUNCTIONS - ( 05 Jun 2018 10:57 )  Alb: 1.4 g/dL / Pro: 4.9 g/dL / ALK PHOS: 130 U/L / ALT: 14 U/L / AST: 32 U/L / GGT: x               MICROBIOLOGY:    Culture - Urine (collected 05 Jun 2018 10:03)  Source: .Urine Catheterized  Final Report (06 Jun 2018 09:21):    Culture grew 3 or more types of organisms which indicate    collection contamination; consider recollection only if clinically    indicated.      RADIOLOGY & ADDITIONAL TESTS:     CONSULTS: endocrinology, palliative care Internal Medicine Progress Note    Claire De Paz, PGY1  Internal Medicine, team 1  Pager [phone] / [phone]  After 7PM on weekdays and 12PM on weekends, please page #[phone]    Patient is a 73y old  Female who presents with a chief complaint of Lethargy, abd pain (23 May 2018 16:13)      SUBJECTIVE / OVERNIGHT EVENTS: Hypothermia resolved with warming blanket yesterday. BP low 80/51, given 250cc LR. Neurology consulted. Spot EEG showed cardiogenic syncope from periods of asystole. HCP Dr. Reymundo Rivera at bedside does not feel there's utility in placing pt on tele or calling cardiology. Bed boarded for PCU. More altered, d/c PO meds. Daughter at bedside. Too unstable to go down for CT yesterday. Alert but altered, groaning today. Apneic episode this morning.    MEDICATIONS  (STANDING):  calcium carbonate 500 mG (Tums) Chewable 1 Tablet(s) Chew once  dextrose 5% + sodium chloride 0.45%. 1000 milliLiter(s) (30 mL/Hr) IV Continuous <Continuous>  levothyroxine Injectable 64 MICROGram(s) IV Push <User Schedule>  nystatin Powder 1 Application(s) Topical two times a day  pantoprazole  Injectable 40 milliGRAM(s) IV Push two times a day  piperacillin/tazobactam IVPB. 3.375 Gram(s) IV Intermittent every 8 hours    MEDICATIONS  (PRN):  acetaminophen    Suspension 650 milliGRAM(s) Oral every 6 hours PRN Mild pain  morphine  - Injectable 0.5 milliGRAM(s) IV Push every 2 hours PRN Breakthrough pain refractory to prn dose  morphine  - Injectable 0.5 milliGRAM(s) IV Push every 3 hours PRN mod-severe pain  ondansetron Injectable 4 milliGRAM(s) IV Push every 8 hours PRN Nausea and/or Vomiting      Vital Signs Last 24 Hrs  T(C): 35.6 (07 Jun 2018 05:40), Max: 36.1 (06 Jun 2018 11:02)  T(F): 96 (07 Jun 2018 05:40), Max: 97 (06 Jun 2018 11:02)  HR: 114 (07 Jun 2018 05:40) (65 - 130)  BP: 122/83 (07 Jun 2018 05:40) (80/51 - 132/82)  BP(mean): --  RR: 22 (07 Jun 2018 05:40) (22 - 22)  SpO2: 100% (07 Jun 2018 05:40) (98% - 100%)    CAPILLARY BLOOD GLUCOSE        I&O's Summary    06 Jun 2018 07:01  -  07 Jun 2018 07:00  --------------------------------------------------------  IN: 890 mL / OUT: 255 mL / NET: 635 mL      PHYSICAL EXAM  GENERAL: NAD, alert, appears chronically ill   HEAD:  Atraumatic, temporal wasting  EYES: EOMI, PERRLA, conjunctiva and sclera clear  NECK: Supple, No JVD  CHEST/LUNG: Improved rales, normal resp effort and rate. 10 second apneic episode  HEART: Irregular rate and rhythm; No murmurs, rubs, or gallops  ABDOMEN: Soft, Nontender, Nondistended; Bowel sounds present  EXTREMITIES:  2+ Peripheral Pulses, No clubbing, cyanosis. 2+ pitting pretibial edema up to hips, R arm weeping serosanguinous fluid  NEURO/PSYCH: AAOx 0, groaning, does not follow commands  SKIN: Erythematous, swollen vulva and inguinal region. Lower extremity and sacral edema      LABS:                             10.0   23.3  )-----------( 91       ( 06 Jun 2018 02:47 )             31.8     CBC Full  -  ( 06 Jun 2018 02:47 )  WBC Count : 23.3 K/uL  Hemoglobin : 10.0 g/dL  Hematocrit : 31.8 %  Platelet Count - Automated : 91 K/uL  Mean Cell Volume : 100.0 fl  Mean Cell Hemoglobin : 31.5 pg  Mean Cell Hemoglobin Concentration : 31.4 gm/dL  Auto Neutrophil # : 20.4 K/uL  Auto Lymphocyte # : 2.2 K/uL  Auto Monocyte # : 0.7 K/uL  Auto Eosinophil # : 0.0 K/uL  Auto Basophil # : 0.0 K/uL  Auto Neutrophil % : 87.4 %  Auto Lymphocyte % : 9.5 %  Auto Monocyte % : 2.9 %  Auto Eosinophil % : 0.2 %  Auto Basophil % : 0.0 %    06-06    145  |  115<H>  |  26<H>  ----------------------------<  77  4.5   |  19<L>  |  1.10    Ca    7.8<L>      06 Jun 2018 13:57  Phos  4.1     06-05  Mg     2.1     06-05    TPro  4.9<L>  /  Alb  1.4<L>  /  TBili  0.2  /  DBili  x   /  AST  32  /  ALT  14  /  AlkPhos  130<H>  06-05    Creatinine Trend: 1.10<--, 1.01<--, 0.92<--, 0.70<--, 0.78<--, 0.80<--  LIVER FUNCTIONS - ( 05 Jun 2018 10:57 )  Alb: 1.4 g/dL / Pro: 4.9 g/dL / ALK PHOS: 130 U/L / ALT: 14 U/L / AST: 32 U/L / GGT: x               MICROBIOLOGY:    Culture - Urine (collected 05 Jun 2018 10:03)  Source: .Urine Catheterized  Final Report (06 Jun 2018 09:21):    Culture grew 3 or more types of organisms which indicate    collection contamination; consider recollection only if clinically    indicated.      RADIOLOGY & ADDITIONAL TESTS:     CONSULTS: endocrinology, palliative care

## 2018-06-07 NOTE — PROGRESS NOTE ADULT - PROBLEM SELECTOR PLAN 4
Appears uncomfortable, tender abdomen, R foot pain as well. Given uptrending SCr, will switch to dilaudid 0.5mg Q2 prn pain/dyspnea and add dilaudid 0.2mg IV Q6 ATC.

## 2018-06-07 NOTE — PROGRESS NOTE ADULT - PROBLEM SELECTOR PLAN 8
Pt reporting right foot pain. Right toes are erythematous and cool to touch. Right pedal pulse palpable and present on doppler. Likely underlying PVD.  -Pt refused NADIRA study. HCP aware. 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 No current treatment this admission. Discussed care with pt's oncologist at [facility] [place]. Possible RT but can be pursued as an outpatient. At this time no further treatment planned given poor performance status. Ongoing C discussions with HCP regarding dispo. She has been living with children prior to admission.  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 No current treatment this admission. Discussed care with pt's oncologist at [facility] [place]. Possible RT but can be pursued as an outpatient. At this time no further treatment planned given poor performance status. Ongoing C discussions with HCP regarding dispo. She has been living with children prior to admission.  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 No current treatment this admission. Discussed care with pt's oncologist at [facility] [place]. Possible RT but can be pursued as an outpatient. At this time no further treatment planned given poor performance status. Ongoing C discussions with HCP regarding dispo. She has been living with children prior to admission.  -patient is comfort measures  -dilaudid IV prn

## 2018-06-07 NOTE — PROGRESS NOTE ADULT - PROBLEM SELECTOR PLAN 1
No further plan for RT at [facility] [place], no disease-directed therapy planned

## 2018-06-07 NOTE — PROGRESS NOTE ADULT - PROBLEM SELECTOR PLAN 7
Home regimen synthroid 75 mcg daily. Per daughter, pt reported she wasn't taking her medications the 2 weeks prior to discharge because she felt too sick. c/b suspected poor absorption  -TSH significantly elevated 31.9. Tree T4 and T3 low  -T4 did not improve after being treated with synthroid x 1 week  -Per endocrine, switch to synthroid 64mcgIV daily then discharge on 88mcg PO. Will need repeat TSH in 4 weeks Pt reporting right foot pain. Right toes are erythematous and cool to touch. Right pedal pulse palpable and present on doppler. Likely underlying PVD.  -Pt refused NADIRA study. HCP aware. Pt is high risk for GIB if anticoagulated  -continue statin Pt reporting right foot pain. Right toes are erythematous and cool to touch. Right pedal pulse palpable and present on doppler. Likely underlying PVD.  -Pt refused NADIRA study. HCP aware. Pt is high risk for GIB if anticoagulated

## 2018-06-07 NOTE — PROGRESS NOTE ADULT - ASSESSMENT
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

## 2018-06-07 NOTE — PROGRESS NOTE ADULT - PROBLEM SELECTOR PLAN 4
[facility] readdressed with pt's children. Pt's son Dr. Reymundo Rivera is the HCP (documentation in chart). Comfort measures  -Blood draws every other day. IVF, blood transfusions acceptable. No central lines, pressors, scope  -Palliative care consulted, HCP pursing transfer to [facility] [place] for possible palliative RT. Discussed with son Dr. Jann Rivera and daughter and they are open to inpatient hospice.  -Hypoglycemia from poor PO intake, switch D5 in LR  -Palliative care in contact with HCP Multifactorial 2/2 GIB, decreased PO intake. RRT for hypotension 5/25  -improving and stabilized. Continue D5 for hypoglycemia   -cont to monitor closely  -Per family goals of care discussion, no central line or pressors are to be used for hypotension even if unresponsive to fluids  -AM cortisol elevated 22, no concern for adrenal insufficiency Multifactorial 2/2 GIB, decreased PO intake. RRT for hypotension 5/25  -improving and stabilized. Continue D5 for hypoglycemia   -no midodrine due to AF  -Per family goals of care discussion, no central line or pressors are to be used for hypotension even if unresponsive to fluids

## 2018-06-07 NOTE — PROGRESS NOTE ADULT - PROBLEM SELECTOR PLAN 5
Multifactorial 2/2 GIB, decreased PO intake. RRT for hypotension 5/25  -improving and stabilized. Continue D5 for hypoglycemia   -cont to monitor closely  -Per family goals of care discussion, no central line or pressors are to be used for hypotension even if unresponsive to fluids  -AM cortisol elevated 22, no concern for adrenal insufficiency Leukocytosis 28 on admission. Tachycardia on admission 2/2 afib with RVR. Unclear whether presence of SIRs is reactive 2/2 malignancy and blood loss vs infectious. Compressive atelectasis on CT, but cannot r/o pneumonia. Per son who is physician, pleural effusion is chronic and has been tapped in past. No other concerning signs for pneumonia at this time. Other possible source of infection was intraabdominal.  -BCx no growth to date. C diff negative x 2. Pt is incontinent and edema makes straight cath difficult, but denies dysuria  -UA positive for cystitis  -Procalcitonin 0.46 indeterminate, repeat lower  -Patient and family would like to pursue comfort measures (antibiotics were discontinued on 5/26)  -Continue zosyn

## 2018-06-07 NOTE — PROGRESS NOTE ADULT - PROBLEM SELECTOR PLAN 6
Leukocytosis 28 on admission. Tachycardia on admission 2/2 afib with RVR. Unclear whether presence of SIRs is reactive 2/2 malignancy and blood loss vs infectious. Compressive atelectasis on CT, but cannot r/o pneumonia. Per son who is physician, pleural effusion is chronic and has been tapped in past. No other concerning signs for pneumonia at this time. Other possible source of infection was intraabdominal.  -s/p zosyn 5-d course  -BCx no growth to date. C diff negative x 2. Pt is incontinent and edema makes straight cath difficult, but denies dysuria  -Procalcitonin 0.46 indeterminate, repeat lower  -Patient and family would like to pursue comfort measures (antibiotics were discontinued on 5/26)  -Repeat BCx sent, broaden CTX to zosyn with uptrending leukocytosis and hypothermia Home regimen synthroid 75 mcg daily. Per daughter, pt reported she wasn't taking her medications the 2 weeks prior to discharge because she felt too sick. c/b suspected poor absorption  -TSH significantly elevated 31.9. Tree T4 and T3 low  -T4 did not improve after being treated with synthroid x 1 week  -Per endocrine, switch to synthroid 64mcgIV daily then discharge on 88mcg PO. Will need repeat TSH in 4 weeks

## 2018-06-07 NOTE — PROGRESS NOTE ADULT - PROBLEM SELECTOR PLAN 2
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 Likely contributing to hypotension. Continues to have melena  -Patient is comfort care, no plan for scope or to reconsult GI  -Pantoprazole IV BID  -s/p 3u pRBC  -Monitor CBC every other day, maintain active T&S GOC readdressed with pt's children. Pt's son  Reymundo Rivera is the HCP (documentation in chart). Comfort measures  -Blood draws every other day. IVF, blood transfusions acceptable. No central lines, pressors, scope  -Palliative care consulted, in contact with HCP  -D5 @ 30 cc/hr due to hypoglycemia  -Bedboarded for PCU GOC discussion ongoing with patient's children. Pt's son  Reymundo Marroquinadelita is the HCP (documentation in chart).  -Comfort measures  -Blood draws every other day. IVF, blood transfusions acceptable  -Palliative care consulted, in contact with HCP  -D5 @ 30 cc/hr due to hypoglycemia  -Bedboarded for PCU GOC discussion ongoing with patient's children. Pt's son  Reymundo Nicole is the HCP (documentation in chart).  -Comfort measures  -no further blood draws. blood transfusions acceptable for symptom control   -Palliative care consulted, in contact with HCP  -D5 @ 30 cc/hr due to hypoglycemia  -Bedboarded for PCU GOC discussion ongoing with patient's children. Pt's son  Reymundo Marroquinadelita is the HCP (documentation in chart).  -Comfort measures  -no further blood draws. blood transfusions acceptable for symptom control   -Palliative care consulted, in contact with HCP  -D5 @ 30 cc/hr due to hypoglycemia  -dilaudid IV prn  -Bedboarded for PCU

## 2018-06-07 NOTE — PROGRESS NOTE ADULT - PROBLEM SELECTOR PLAN 10
DVT: No pharmacologic 2/2 GIB, SCDs  Diet: mechanical soft  Dispo: Bedboard for PCU, comfort measures  Atrial fibrillation: d/c dig and lorpessor due to hypotension, no AC 2/2 GIB. Monitor off tele

## 2018-06-07 NOTE — PROGRESS NOTE ADULT - PROBLEM SELECTOR PLAN 3
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 Likely contributing to hypotension. Continues to have melena  -Patient is comfort care, no plan for scope or to reconsult GI  -Pantoprazole IV BID  -s/p 3u pRBC  -Monitor CBC every other day, maintain active T&S

## 2018-06-07 NOTE — PROGRESS NOTE ADULT - PROBLEM SELECTOR PLAN 2
Requiring pRBC during this admission; currently H/H stable, no plans for further routine blood draws to assess CBC or for pRBC transfusions, although if it may symptomatically help and if she appears anemic, HCP son Reymundo would want to decide on a case-by-case basis

## 2018-06-07 NOTE — PROGRESS NOTE ADULT - PROBLEM SELECTOR PLAN 1
Having multiple apneic episodes past few days associated with brief arm shaking  -continuous EEG Spot EEG showed episodes of bradycardia to asystole causing EEG voltage suppression when pt would become apneic.  -appreciate neurology recs  -pt too unstable to go down to CT  -PCU bed requested. HCP does not feel there is utility in placing pt on tele or cardiology consult

## 2018-06-07 NOTE — PROGRESS NOTE ADULT - SUBJECTIVE AND OBJECTIVE BOX
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 Found with acute-on-chronic anemia likely from underlying malignancy, with AF with RVR. No plans for scoping to evaluate due to goals from family. Also with R foot pain, likely with PVD but patient did not want further studies. Also c/b hypotension, likely from multifactorial cause (GI bleed, decreased PO), with no plans for pressors based on goals. s/p zosyn for possible PNA. Per son, she was walking about a month ago, but now is more bedbound, with progressively worsening appetite.     INTERVAL EVENTS: patient appears worse today, moaning, looking around but not responsive to verbal questions or commands    ADVANCE DIRECTIVES:    DNR [X ] YES [ ] NO                            [X ] Completed  MOLST  [ ] YES [ ] NO                      [ ] Completed  Health Care Proxy [X ] YES  [ ] NO   [X ] Completed  Living Will  [ ] YES [ ] NO             [ ] Surrogate  [X ] HCP  [ ] Guardian:    son Ralph Rivera ()     ALLERGIES:   Allergies    No Known Allergies    Intolerances    MEDICATIONS  (STANDING):  calcium carbonate 500 mG (Tums) Chewable 1 Tablet(s) Chew once  dextrose 5% + sodium chloride 0.45%. 1000 milliLiter(s) (30 mL/Hr) IV Continuous <Continuous>  HYDROmorphone  Injectable 0.2 milliGRAM(s) IV Push every 6 hours  levothyroxine Injectable 64 MICROGram(s) IV Push <User Schedule>  nystatin Powder 1 Application(s) Topical two times a day  pantoprazole  Injectable 40 milliGRAM(s) IV Push two times a day  piperacillin/tazobactam IVPB. 3.375 Gram(s) IV Intermittent every 8 hours    MEDICATIONS  (PRN):  acetaminophen    Suspension 650 milliGRAM(s) Oral every 6 hours PRN Mild pain  HYDROmorphone  Injectable 0.2 milliGRAM(s) IV Push every 2 hours PRN moderate-severe painHOLD for sedation, RR < 12  HYDROmorphone  Injectable 0.2 milliGRAM(s) IV Push every 2 hours PRN dyspnea  ondansetron Injectable 4 milliGRAM(s) IV Push every 8 hours PRN Nausea and/or Vomiting      PRESENT SYMPTOMS:  Source: [X ] Patient   [X ] Family   [X ] Team     Pain:                        [ ] No [ X] Yes             [ ] Mild [ ] Moderate [ ] Severe    Onset -  Location - RLE, abdomen  Duration -  Character -  Alleviating/Aggravating -  Radiation -  Timing -  Unable to obtain from patient    Dyspnea:                [X ] No [ ] Yes             [ ] Mild [ ] Moderate [ ] Severe    Anxiety:                  [X ] No [ ] Yes             [ ] Mild [ ] Moderate [ ] Severe    Fatigue:                  [ ] No [X ] Yes             [ ] Mild [X ] Moderate [ ] Severe    Nausea:                  [ ] No [ ] Yes             [ ] Mild [ ] Moderate [ ] Severe    Loss of appetite:   [ ] No [X ] Yes             [ ] Mild [ ] Moderate [ ] Severe    Constipation:        [X ] No [ ] Yes             [ ] Mild [ ] Moderate [ ] Severe    Other Symptoms:  [ ] All other review of systems negative   [X ] Unable to obtain due to poor mentation     Karnofsky Performance Score/Palliative Performance Status Version 2:  30-40%    PHYSICAL EXAM:  Vital Signs Last 24 Hrs  T(C): 35.4 (07 Jun 2018 10:50), Max: 35.7 (06 Jun 2018 17:00)  T(F): 95.7 (07 Jun 2018 10:50), Max: 96.3 (06 Jun 2018 17:00)  HR: 105 (07 Jun 2018 10:50) (65 - 130)  BP: 112/78 (07 Jun 2018 10:50) (80/51 - 122/83)  BP(mean): --  RR: 22 (07 Jun 2018 10:50) (22 - 22)  SpO2: 100% (07 Jun 2018 10:50) (98% - 100%)    General:  [ ] Alert  [ ] Oriented x      [X ] Lethargic  [ ] Agitated   [ ] Cachexia   [ ] Unarousable  [X ] Verbal  [ ] Non-Verbal  [ ] Sedated    HEENT:  [X ] Normal   [ ] Dry mouth   [ ] ET Tube    [ ] Trach  [ ] Oral lesions    Lungs:   [X ] Clear [ ] Tachypnea  [ ] Audible excessive secretions   [ ] Rhonchi        [ ] Right [ ] Left [ ] Bilateral  [ ] Crackles        [ ] Right [ ] Left [ ] Bilateral  [ ] Wheezing     [ ] Right [ ] Left [ ] Bilateral    Cardiovascular:  [X ] Regular [ ] Irregular [ ] Tachycardia   [ ] Bradycardia  [ ] Murmur [ ] Other    Abdomen: [X ] Soft  [ ] Distended   [X ] +BS  [ ] Non tender [ X] Tender  [ ]PEG   [ ]OGT/ NGT   [ ] Ostomy   Last BM:   6/7/18    Genitourinary: [ ] Normal [ ] Incontinent   [ ] Oliguria/Anuria   [ ] Davidson   [X ] No davidson    Musculoskeletal:  [ ] Normal   [X ] Weakness  [ ] Bedbound/Wheelchair bound [ ] Edema    Neurological: [ ] No focal deficits  [ X] Cognitive impairment  [ ] Dysphagia [ ] Dysarthria [ ] Paresis [ ] Other     Skin: [X ] Normal   [ ] Pressure ulcer(s)     [ ] Rash   [ ] Other    LABS: reviewed                          10.0   23.3  )-----------( 91       ( 06 Jun 2018 02:47 )             31.8   06-06    145  |  115<H>  |  26<H>  ----------------------------<  77  4.5   |  19<L>  |  1.10    Ca    7.8<L>      06 Jun 2018 13:57    Shock: [ ] Septic [ ] Cardiogenic [ ] Neurologic [ ] Hypovolemic  Vasopressors x 0  Inotrophs x 0    Protein Calorie Malnutrition: [ ] Mild [ ] Moderate [ ] Severe  Oral Intake: [ ] Unable/mouth care only [ ] Minimal [ X] Moderate [ ] Full Capability  Diet: [ ] NPO [ ] Tube feeds [ ] TPN [X ] Other: soft    RADIOLOGY & ADDITIONAL STUDIES: no new imaging for review    REFERRALS:   [ ] Chaplaincy  [ ] Hospice  [ ] Child Life  [ ] Social Work  [ ] Case management [ ] Holistic Therapy

## 2018-06-07 NOTE — CHART NOTE - NSCHARTNOTEFT_GEN_A_CORE
Source: Patient [ ]    Family [ ]     other [X ] Medical record, RN    Diet : Mechanical Soft, Ensure Enlive- 2 per day (provides additional 700cal, 40gm protein)       Patient reports [ ] nausea  [ ] vomiting [ ] diarrhea [ ] constipation  [ ]chewing problems [ ] swallowing issues  [ ] other:      PO intake:  < 50% [ ] 50-75% [ ]   % [ ]  other :     Source for PO intake [ ] Patient [ ] family [ ] chart [ ] staff [ ] other     Enteral /Parenteral Nutrition:       Current Weight:   % Weight Change    Pertinent Medications: MEDICATIONS  (STANDING):  calcium carbonate 500 mG (Tums) Chewable 1 Tablet(s) Chew once  dextrose 5% + sodium chloride 0.45%. 1000 milliLiter(s) (30 mL/Hr) IV Continuous <Continuous>  HYDROmorphone  Injectable 0.2 milliGRAM(s) IV Push every 6 hours  levothyroxine Injectable 64 MICROGram(s) IV Push <User Schedule>  nystatin Powder 1 Application(s) Topical two times a day  pantoprazole  Injectable 40 milliGRAM(s) IV Push two times a day  piperacillin/tazobactam IVPB. 3.375 Gram(s) IV Intermittent every 8 hours    MEDICATIONS  (PRN):  acetaminophen    Suspension 650 milliGRAM(s) Oral every 6 hours PRN Mild pain  HYDROmorphone  Injectable 0.2 milliGRAM(s) IV Push every 2 hours PRN moderate-severe painHOLD for sedation, RR < 12  HYDROmorphone  Injectable 0.2 milliGRAM(s) IV Push every 2 hours PRN dyspnea  ondansetron Injectable 4 milliGRAM(s) IV Push every 8 hours PRN Nausea and/or Vomiting    Pertinent Labs:  06-06 Na145 mmol/L Glu 77 mg/dL K+ 4.5 mmol/L Cr  1.10 mg/dL BUN 26 mg/dL<H> 06-05 Phos 4.1 mg/dL 06-05 Alb 1.4 g/dL<L>      Skin:     Estimated Needs:   [ ] no change since previous assessment  [ ] recalculated:       Previous Nutrition Diagnosis:     [ ] Inadequate Energy Intake [ ]Inadequate Oral Intake [ ] Excessive Energy Intake     [ ] Underweight [ ] Increased Nutrient Needs [ ] Overweight/Obesity     [ ] Altered GI Function [ ] Unintended Weight Loss [ ] Food & Nutrition Related Knowledge Deficit [ ] Malnutrition          Nutrition Diagnosis is [ ] ongoing  [ ] resolved [ ] not applicable          New Nutrition Diagnosis: [ ] not applicable    [ ] Inadequate Protein Energy Intake [ ]Inadequate Oral Intake [ ] Excessive Energy Intake     [ ] Underweight [ ] Increased Nutrient Needs [ ] Overweight/Obesity     [ ] Altered GI Function [ ] Unintended Weight Loss [ ] Food & Nutrition Related Knowledge Deficit[ ] Limited Adherence to nutrition related recommendations [ ] Malnutrition  [ ] other: Free text       Related to:      As evidenced by:      Interventions:     Recommend    [ ] Change Diet To:    [ ] Nutrition Supplement    [ ] Nutrition Support    [ ] Other:        Monitoring and Evaluation:     [ ] PO intake [ ] Tolerance to diet prescription [ ] weights [ ] follow up per protocol    [ ] other: Source: Patient [ ]    Family [ ]     other [X ] Medical record, RN    Diet : Mechanical Soft, Ensure Enlive- 2 per day (provides additional 700cal, 40gm protein)     Pt seen for malnutrition follow up. Medical chart reviewed events noted. Per chart pt with "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, partial pancreatectomy and nephrectomy about a year ago, CVA with residual R sided weakness and aphasia, and hypothyroidism 2/2 immunotherapy who presents for lethargy and diffuse abdominal pain x 2 weeks found to have acute on chronic anemia suspected to be secondary to acute blood loss from underlying malignancy eroding into duodenum and colon s/p 3u pRBC, complicated by AF RVR now rate controlled on digoxin and lopressor". Per GOC note focusing on comfort measures.  Per RN pt with no PO intake.  Plan for pt to be transferred to PCU.    PO intake:  < 50% [X ] 50-75% [ ]   % [ ]  other :  Source for PO intake [ ] Patient [ ] family [ ] chart [X ] staff [ ] other      Current Weight: No new weight to assess      Pertinent Medications: MEDICATIONS  (STANDING):  calcium carbonate 500 mG (Tums) Chewable 1 Tablet(s) Chew once  dextrose 5% + sodium chloride 0.45%. 1000 milliLiter(s) (30 mL/Hr) IV Continuous <Continuous>  HYDROmorphone  Injectable 0.2 milliGRAM(s) IV Push every 6 hours  levothyroxine Injectable 64 MICROGram(s) IV Push <User Schedule>  nystatin Powder 1 Application(s) Topical two times a day  pantoprazole  Injectable 40 milliGRAM(s) IV Push two times a day  piperacillin/tazobactam IVPB. 3.375 Gram(s) IV Intermittent every 8 hours    MEDICATIONS  (PRN):  acetaminophen    Suspension 650 milliGRAM(s) Oral every 6 hours PRN Mild pain  HYDROmorphone  Injectable 0.2 milliGRAM(s) IV Push every 2 hours PRN moderate-severe painHOLD for sedation, RR < 12  HYDROmorphone  Injectable 0.2 milliGRAM(s) IV Push every 2 hours PRN dyspnea  ondansetron Injectable 4 milliGRAM(s) IV Push every 8 hours PRN Nausea and/or Vomiting    Pertinent Labs:  06-06 Na145 mmol/L Glu 77 mg/dL K+ 4.5 mmol/L Cr  1.10 mg/dL BUN 26 mg/dL<H> 06-05 Phos 4.1 mg/dL 06-05 Alb 1.4 g/dL<L>      Skin: +1 generalized edema, +3 bilateral leg, ankle, knee, foot edema.     Estimated Needs:   [X ] no change since previous assessment  [ ] recalculated:       Previous Nutrition Diagnosis:   [ X] Malnutrition, Severe          Nutrition Diagnosis is [X ] ongoing  [ ] resolved [ ] not applicable          New Nutrition Diagnosis: [X ] not applicable        Recommend    1) Continue current diet/supplements if within GOC  2) RD remains available as needed        Monitoring and Evaluation:     1.Continue to monitor weight, po intake, labs, and GI tolerance

## 2018-06-07 NOTE — GOALS OF CARE CONVERSATION - PERSONAL ADVANCE DIRECTIVE - CONVERSATION DETAILS
Discussed current decline in medical state, which son has noticed as well. Given this, he is focusing more on comfort measures, with no further blood draws or pRBC transfusions, limiting vital signs, no IVF boluses, no pressors, DNR/DNI. If patient appears anemic and pRBC transfusion may help symptomatically, he would like to be involved in making decisions on a case-by-case basis. Explained to son that she may be entering her dying process, and he expressed understanding. Also discussed with daughter at bedside, who defers to Reymundo for all decision making. Placed on board for PCU,

## 2018-06-08 DIAGNOSIS — G93.40 ENCEPHALOPATHY, UNSPECIFIED: ICD-10-CM

## 2018-06-08 LAB — DIGOXIN SERPL-MCNC: 1.2 NG/ML — SIGNIFICANT CHANGE UP (ref 0.8–2)

## 2018-06-08 RX ORDER — METOPROLOL TARTRATE 50 MG
2.5 TABLET ORAL EVERY 6 HOURS
Qty: 0 | Refills: 0 | Status: DISCONTINUED | OUTPATIENT
Start: 2018-06-08 | End: 2018-06-09

## 2018-06-08 RX ADMIN — HYDROMORPHONE HYDROCHLORIDE 0.2 MILLIGRAM(S): 2 INJECTION INTRAMUSCULAR; INTRAVENOUS; SUBCUTANEOUS at 00:48

## 2018-06-08 RX ADMIN — PANTOPRAZOLE SODIUM 40 MILLIGRAM(S): 20 TABLET, DELAYED RELEASE ORAL at 06:15

## 2018-06-08 RX ADMIN — HYDROMORPHONE HYDROCHLORIDE 0.2 MILLIGRAM(S): 2 INJECTION INTRAMUSCULAR; INTRAVENOUS; SUBCUTANEOUS at 11:14

## 2018-06-08 RX ADMIN — PIPERACILLIN AND TAZOBACTAM 25 GRAM(S): 4; .5 INJECTION, POWDER, LYOPHILIZED, FOR SOLUTION INTRAVENOUS at 06:28

## 2018-06-08 RX ADMIN — PANTOPRAZOLE SODIUM 40 MILLIGRAM(S): 20 TABLET, DELAYED RELEASE ORAL at 17:46

## 2018-06-08 RX ADMIN — SODIUM CHLORIDE 30 MILLILITER(S): 9 INJECTION, SOLUTION INTRAVENOUS at 06:14

## 2018-06-08 RX ADMIN — HYDROMORPHONE HYDROCHLORIDE 0.2 MILLIGRAM(S): 2 INJECTION INTRAMUSCULAR; INTRAVENOUS; SUBCUTANEOUS at 06:15

## 2018-06-08 RX ADMIN — HYDROMORPHONE HYDROCHLORIDE 0.2 MILLIGRAM(S): 2 INJECTION INTRAMUSCULAR; INTRAVENOUS; SUBCUTANEOUS at 19:58

## 2018-06-08 RX ADMIN — Medication 2.5 MILLIGRAM(S): at 17:47

## 2018-06-08 RX ADMIN — Medication 60 MILLIGRAM(S): at 11:15

## 2018-06-08 RX ADMIN — NYSTATIN CREAM 1 APPLICATION(S): 100000 CREAM TOPICAL at 17:47

## 2018-06-08 RX ADMIN — NYSTATIN CREAM 1 APPLICATION(S): 100000 CREAM TOPICAL at 06:15

## 2018-06-08 RX ADMIN — Medication 64 MICROGRAM(S): at 06:15

## 2018-06-08 NOTE — PROGRESS NOTE ADULT - ASSESSMENT
patient is a 72 yo F PMH of Afib (not on AC 2/2 GIB), 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, partial pancreatectomy and nephrectomy, CVA with residual R sided weakness and hypothyroidism 2/2 immunotherapy who presented  with lethargy and diffuse abdominal pain.  Brought to PCU for continued care and symptom management.

## 2018-06-08 NOTE — PROGRESS NOTE ADULT - PROBLEM SELECTOR PLAN 2
multifactorial  soumedrol given x 3 doses today for potential reversal of encephalopathy origin thought to be 2/2 to immunotherapy multifactorial  solumedrol given x 3 doses today for potential reversal of encephalopathy origin thought to be 2/2 to immunotherapy

## 2018-06-08 NOTE — PROGRESS NOTE ADULT - PROBLEM SELECTOR PLAN 4
Dagoberto Lee spoke with Dr. Rivera via the phone.  He wants to focus of keeping his mom comfortable but also wants to treat any reversible causes of delerium.  I additional dose give of solumedrol as sons request to see if delerium is reversible.  Pt son will be in later today at pts bedside.  will f/u with him then. Dagoberto Lee spoke with Dr. Rivera via the phone.  He wants to focus of keeping his mom comfortable but also wants to treat any reversible causes of delirium.  I additional dose give of solumedrol as sons request to see if delirium is reversible.  Pt son will be in later today at pts bedside.  will f/u with him then.

## 2018-06-09 VITALS — RESPIRATION RATE: 15 BRPM

## 2018-06-09 PROCEDURE — 82607 VITAMIN B-12: CPT

## 2018-06-09 PROCEDURE — 86900 BLOOD TYPING SEROLOGIC ABO: CPT

## 2018-06-09 PROCEDURE — 80202 ASSAY OF VANCOMYCIN: CPT

## 2018-06-09 PROCEDURE — P9011: CPT

## 2018-06-09 PROCEDURE — 85610 PROTHROMBIN TIME: CPT

## 2018-06-09 PROCEDURE — 74018 RADEX ABDOMEN 1 VIEW: CPT

## 2018-06-09 PROCEDURE — 82803 BLOOD GASES ANY COMBINATION: CPT

## 2018-06-09 PROCEDURE — 81001 URINALYSIS AUTO W/SCOPE: CPT

## 2018-06-09 PROCEDURE — 84484 ASSAY OF TROPONIN QUANT: CPT

## 2018-06-09 PROCEDURE — 86850 RBC ANTIBODY SCREEN: CPT

## 2018-06-09 PROCEDURE — 87324 CLOSTRIDIUM AG IA: CPT

## 2018-06-09 PROCEDURE — 85014 HEMATOCRIT: CPT

## 2018-06-09 PROCEDURE — 84132 ASSAY OF SERUM POTASSIUM: CPT

## 2018-06-09 PROCEDURE — 82565 ASSAY OF CREATININE: CPT

## 2018-06-09 PROCEDURE — 97530 THERAPEUTIC ACTIVITIES: CPT

## 2018-06-09 PROCEDURE — 82533 TOTAL CORTISOL: CPT

## 2018-06-09 PROCEDURE — 71045 X-RAY EXAM CHEST 1 VIEW: CPT

## 2018-06-09 PROCEDURE — 99233 SBSQ HOSP IP/OBS HIGH 50: CPT | Mod: GC

## 2018-06-09 PROCEDURE — 83735 ASSAY OF MAGNESIUM: CPT

## 2018-06-09 PROCEDURE — 84480 ASSAY TRIIODOTHYRONINE (T3): CPT

## 2018-06-09 PROCEDURE — 86901 BLOOD TYPING SEROLOGIC RH(D): CPT

## 2018-06-09 PROCEDURE — 80053 COMPREHEN METABOLIC PANEL: CPT

## 2018-06-09 PROCEDURE — 95819 EEG AWAKE AND ASLEEP: CPT

## 2018-06-09 PROCEDURE — 36430 TRANSFUSION BLD/BLD COMPNT: CPT

## 2018-06-09 PROCEDURE — 82272 OCCULT BLD FECES 1-3 TESTS: CPT

## 2018-06-09 PROCEDURE — 87040 BLOOD CULTURE FOR BACTERIA: CPT

## 2018-06-09 PROCEDURE — 97162 PT EVAL MOD COMPLEX 30 MIN: CPT

## 2018-06-09 PROCEDURE — 93005 ELECTROCARDIOGRAM TRACING: CPT

## 2018-06-09 PROCEDURE — 82746 ASSAY OF FOLIC ACID SERUM: CPT

## 2018-06-09 PROCEDURE — 80162 ASSAY OF DIGOXIN TOTAL: CPT

## 2018-06-09 PROCEDURE — 87449 NOS EACH ORGANISM AG IA: CPT

## 2018-06-09 PROCEDURE — 85027 COMPLETE CBC AUTOMATED: CPT

## 2018-06-09 PROCEDURE — 87086 URINE CULTURE/COLONY COUNT: CPT

## 2018-06-09 PROCEDURE — P9016: CPT

## 2018-06-09 PROCEDURE — 83605 ASSAY OF LACTIC ACID: CPT

## 2018-06-09 PROCEDURE — 84145 PROCALCITONIN (PCT): CPT

## 2018-06-09 PROCEDURE — 93923 UPR/LXTR ART STDY 3+ LVLS: CPT

## 2018-06-09 PROCEDURE — 93306 TTE W/DOPPLER COMPLETE: CPT

## 2018-06-09 PROCEDURE — 85730 THROMBOPLASTIN TIME PARTIAL: CPT

## 2018-06-09 PROCEDURE — 84295 ASSAY OF SERUM SODIUM: CPT

## 2018-06-09 PROCEDURE — 84443 ASSAY THYROID STIM HORMONE: CPT

## 2018-06-09 PROCEDURE — 82962 GLUCOSE BLOOD TEST: CPT

## 2018-06-09 PROCEDURE — 84439 ASSAY OF FREE THYROXINE: CPT

## 2018-06-09 PROCEDURE — 74177 CT ABD & PELVIS W/CONTRAST: CPT

## 2018-06-09 PROCEDURE — 82435 ASSAY OF BLOOD CHLORIDE: CPT

## 2018-06-09 PROCEDURE — 80048 BASIC METABOLIC PNL TOTAL CA: CPT

## 2018-06-09 PROCEDURE — 86923 COMPATIBILITY TEST ELECTRIC: CPT

## 2018-06-09 PROCEDURE — 97110 THERAPEUTIC EXERCISES: CPT

## 2018-06-09 PROCEDURE — 99285 EMERGENCY DEPT VISIT HI MDM: CPT | Mod: 25

## 2018-06-09 PROCEDURE — 82947 ASSAY GLUCOSE BLOOD QUANT: CPT

## 2018-06-09 PROCEDURE — 82330 ASSAY OF CALCIUM: CPT

## 2018-06-09 PROCEDURE — 97116 GAIT TRAINING THERAPY: CPT

## 2018-06-09 PROCEDURE — 84100 ASSAY OF PHOSPHORUS: CPT

## 2018-06-09 PROCEDURE — 71260 CT THORAX DX C+: CPT

## 2018-06-09 RX ADMIN — PANTOPRAZOLE SODIUM 40 MILLIGRAM(S): 20 TABLET, DELAYED RELEASE ORAL at 06:11

## 2018-06-09 RX ADMIN — Medication 64 MICROGRAM(S): at 06:11

## 2018-06-09 RX ADMIN — SODIUM CHLORIDE 30 MILLILITER(S): 9 INJECTION, SOLUTION INTRAVENOUS at 06:12

## 2018-06-09 RX ADMIN — NYSTATIN CREAM 1 APPLICATION(S): 100000 CREAM TOPICAL at 06:11

## 2018-06-09 RX ADMIN — HYDROMORPHONE HYDROCHLORIDE 0.2 MILLIGRAM(S): 2 INJECTION INTRAMUSCULAR; INTRAVENOUS; SUBCUTANEOUS at 06:12

## 2018-06-09 NOTE — PROVIDER CONTACT NOTE (OTHER) - NAME OF MD/NP/PA/DO NOTIFIED:
MD GOLDSTEIN
Charu Mendes
Charu Mendes
Charu Nolan
Claire De Paz MD
Dr De Paz #[phone]
Dr. Charu Jean
Dr. Claire De Paz
Dr. Rena Lundberg
Dr. Tu Escoto , Dr Montilla
Jeanmarie Martinez
MD Claire De Paz
MD Espana
MD Tamika Spangler
Tamika Chase MD
Tamika Chase MD
Team 1: [phone] Dr De Paz
dr BERNY CARDENAS
jared Morales MD

## 2018-06-09 NOTE — PROVIDER CONTACT NOTE (OTHER) - ASSESSMENT
Pt w/generalized edema, Pt in current AFib as per cardiac monitor w/PMH of Afib
/82, HR 98, RR 22, O2 100% on 4L O2 nasal cannula, temp. rectally is 34.6, pt still experiencing "seizure" episodes when repositioning but becomes responsive in 15 seconds, no distress noted.
Heart ranges in 117 bpm range, when pt turned/repositioned and she becomes unresponsive and stops "yelling", she also holds her breaths- after 10-15 seconds she becomes responsive & returns to yelling & grabbing.
Patient in state of confusion.
Rectal temp. 95, HR 65, BP 99/6, RR 22, pt turned/repositioned and she experienced another "seizure" episode- pt became stopped yelling & grabbing/ became unresponsive but in the matter of 10 seconds becomes alert again.
Rectal temp. 96.3, BP 80/51, , RR 22, O2 98% on 3L O2 nasal cannula, pt moaning in pain, no distress noted.
17:30: 79/54 b/p, , respiratory 18, 36.2 temp axillary, and 100 % on 2L NC
No response to external stimuli  No spontaneous respirations  No apical heart rate  Negative papillary response to light
Patient alert and oriented x 2-3 with periods of confusion.  VSS. Increase restlessness noted during the night. Refusing PO medication.  Bear hugger for hypothermia. Refusing at this time. Upper extremities cool to touch. Multiple attempts to calm the patient with minimal effect.
Patient alert and oriented x 4. VSS. S/P digoxin 0.25mg IVP x 2 doses.  A-fib with HR in 110-130's on cardiac monitor. No bladder distention noted. Bladder scan secondary to no void overnight resulting in >200ml or residual.
Pt A&O&3. Denies chest pain, sob or discomfort. Bladder non distended.
Pt A&O&3. Denies chest pain/sob or discomfort. 107/73, HR 87. Asymptomatic NS at 100 ml/hr.
Pt Aox1-2 (baseline). Received 6pm medications but will not take 10pm medications or any other PO food. Pt unable to understand need to take medications
Pt asymptomatic- no dizziness, lightheadedness.  VS as mentioned. IVF 0.9% NS being infused for 24 hours as ordered.
Pt breathing at rate of 22 Pt still remains to make needs unknown to staff.
Pt is A&Ox2-3 as per baseline. No nonverbal s/s pain or discomfort.
Pt is A&Ox2-3 as per baseline. denies pain/ discomfort/ lightheadedness/ dizziness.
Pt responsive to stimuli has eyes open unable to speak and make needs known this is baseline
pt aox3, sleeping during care but arousable.
pt aox4.
pupils constricted pt having jerk movements
vital signs stable; pt is pale in color

## 2018-06-09 NOTE — PROGRESS NOTE ADULT - ASSESSMENT
patient is a 72 yo F PMH of Afib (not on AC 2/2 GIB), 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, partial pancreatectomy and nephrectomy, CVA with residual R sided weakness and hypothyroidism 2/2 immunotherapy who presented with lethargy and diffuse abdominal pain.  Brought to PCU for continued care and symptom management.  Actively dying

## 2018-06-09 NOTE — PROVIDER CONTACT NOTE (OTHER) - SITUATION
Unable to get blood pressure, multiple attempts made. Pt alert and responding to pain and stimuli but confused in orientation at baseline
 and irregular to palpate
-pt refused all morning PO meds including Protonix, digoxin, and Metroprolol; po intake encouraged  -large watery black stool is noted
Attempted to give pt her meds crushed in applesauce, patient is refusing to take anything by mouth. Am unable to give remeron, melatonin, lipitor, and potassium phosphate/sodium phosphate
HR remains in 117bpm despite receiving medication this morning. When cleaning/repositioning patient, she had an "seizure" episode.
No void overnight with bladder scan resulting in >200 ml of urine output
Patient hypoglycemic on this mornings FS originally 65 and rechecked to be 56.
Patient rectal temperature assess is 94.3, warming blanket applied, will continue to monitor.
Patient with increased restlessness and refusing PO meds at bedtime
Patient without spontaneous respirations or pulse
Pt BP 81/53, asymptomatic
Pt became no responsive for 20 seconds after having bowel movement face pale pupils constricted Pt also had jerk movements during this time
Pt is hypoglycemic with first blood glucose at 0727 31, repeat blood glucose 43. Pt encouraged to drink juice, tolerating juice well this AM.
Pt.s heart rate 42 briefly
Pts B.P 78/57 HR 74
Pts blood glucose 54. Pt has poor PO intake refusing to eat/ drink at this time.
Rectal temp. 96.3- warming blanket maintained, BP 80/51 (left calf), , pt urine output over shift 150ml.
Rectal temperature is 95, when pt turned/repositioned she experienced another "seizure" episode.
Unable to straight catheter.  Unable ot obtain Urinalysis and urine culture. Bladder scan at 10 am  317ml
pt b/p s/p 1/2 PRBC unit is 79/54 via left leg; IV site is symptomatic and new access is pending: pt refused po phos supplement
pt bladder scan 480ml , order to straight cath but patient voided (incontinent), attempted straight cath but no urine, will re-assess. pt refusing PAC device & c/o right ankle pain
pt c/o right ankle pain, toes cool to touch , unable to palpate right pedal pulses, weak pulse heard on doppler. left foot warm to touch w/ palpable pulses

## 2018-06-09 NOTE — PROVIDER CONTACT NOTE (OTHER) - BACKGROUND
72 yo F PMH of Afib (not on AC 2/2 GIB), 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, partial pancreatectomy and nephrectomy
Admitted for anemia and low B.P metastatic sarcoma awaiting transfer to [facility]
Admitted for hypotension and anemia metastatic  sacroma
Admitted with hypotension anemia
Patient admitted with AMS, hypotension secondary to  N/V, GIB from retroperitoneal sarcoma invasion/fistula to duodenum and colon, possible sepsis, AF RVR
Patient has DNR order
Pt admitted for hypotension and GI bleed from retroperitoneal sarcoma
Pt admitted for hypotension. Pt has hx of hypoglycemia during this admission r/t poor PO intake.
Pt admitted for hypotension. Pt has hx of hypoglycemia during this admission, pt on dextrose 5% + sodium chloride 0.9% IV Fluids
Pt is admitted for hypotension. PMH of stroke, atrial fibrillation.
Pt is admitted for hypotension. PMH of stroke, atrial; fibrillation
admitted for hypotension hx of metastatic sarcoma, cva w ride side weakness, rapid AF.
admitted for hypotension, anemia,  rapid AF. hx of metastatic sarcoma duodenal mass, cva w/ residual
dx: hypotension, GIB
dx: hypotension, GIB  hx: stroke, afib, hypothroid
dx: hypotension; GIB
No significant past surgical history

## 2018-06-09 NOTE — PROVIDER CONTACT NOTE (OTHER) - ACTION/TREATMENT ORDERED:
Md made aware. No interventions at this time. Will continue to monitor.
MD aware, pending EEG, pending blood cultures/phlebotomy, no other intervention at this time, will continue to monitor.
MD aware, pending EEG, will possible adjust medication to control heart rate, no other intervention at this time, will continue to monitor.
MD aware, still pending EEG, phlebotomy at bedside drawing STAT lactate, venous blood gas, BMP & Blood culture, pending abdominal/pelvis CT, will continue to monitor.
1 amp d50 administered. Blood glucose rechecked 3 subsequent times to reach 71. Son of patient then refused further testing and only wants sugar rechecked before meals. Patient remains on D5 & NS @ 70
MD De Paz & MD Dueñas aware, PRN Morphine given, STAT EKG performed, pending orders for Bolus, MD Dueñas discuss w/son plan of care- CT versus no CT scans, will c
MD Spangler aware, will continue to monitor for further interventions.
MD assessed patient at bedside. MD able to palpate b/l pedal pulses. NADIRA test ordered. continue to monitor patient
MD aware, will order potassium/sodium phospate as IVPB. Other meds okay not to give. MD will follow up w/patient's son and day team about further care if pt continues to refuse. Continuing to monitor
STAT PRB's 1/2 units to be given x2
MD De Paz aware, awaiting dextrose gel order as per MD. Will continue to monitor pt.
MD aware give bolus and repeat B.P. Pt currently DNR
Morphine to be given as per MD and orders pt grimaces when touching abd.
Orders to administer 500cc Sodium chloride over 1 hour, reassess in 1 hour
Orders to administer Ativan 0.25mg IVP x 1 dose. Will continue to monitor for changes.
Orders to continue to monitor and notify MD if no void or c/o distress. Will continue to monitor.
Pt became responsive and proceeded to scream and get back to baseline safety maintained will cont to monitor
Will contact with furthers orders. Will continue to monitor
Will contact with furthers orders. Will continue to monitor
attempt straight cath at 1545 but patient voided again. will continue to monitor.
see pt expiration note

## 2018-06-09 NOTE — PROGRESS NOTE ADULT - PROVIDER SPECIALTY LIST ADULT
Endocrinology
Gastroenterology
Gastroenterology
Internal Medicine
Palliative Care
Internal Medicine

## 2018-06-09 NOTE — DISCHARGE NOTE FOR THE EXPIRED PATIENT - HOSPITAL COURSE
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 Admitted for: Lethargy and pain. Hospital course c/b acute-on-chronic anemia likely from underlying malignancy, with AF with RVR. Also with R foot pain, likely with PVD but patient did not want further studies. Also c/b hypotension, likely from multifactorial cause (GI bleed, decreased PO), with no plans for pressors based on goals. s/p zosyn for possible PNA. Transferred to the PCU for comfort care; primary disease sarcoma w/ mets.  She  on 2018

## 2018-06-09 NOTE — PROVIDER CONTACT NOTE (OTHER) - RECOMMENDATIONS
Bolus?
IV push dextrose.
2nd 1/2 unit of PRBC pending after obtaining new IV access
MD at bedside
MD notified
MD notified of the changes.
MD notified.
Notify MD
foot xray? gout?
give 250cc bolus of LR
pt pronounced dead at 14:29   pt son DAVID notified
sternal rub done

## 2018-06-09 NOTE — PROGRESS NOTE ADULT - PROBLEM SELECTOR PLAN 4
Spoke with dtyonas Velazquez at bedside and then son Reymundo DAVALOS. Addressed all questions and concerns, as well as prognosis of hours at this point, unlikely days  Ongoing support for patient and family

## 2018-06-09 NOTE — PROVIDER CONTACT NOTE (OTHER) - DATE AND TIME:
05-Jun-2018 01:33
01-Jun-2018 22:00
05-Jun-2018 19:56
06-Jun-2018 06:00
06-Jun-2018 06:00
06-Jun-2018 07:55
06-Jun-2018 09:50
06-Jun-2018 13:30
06-Jun-2018 18:00
09-Jun-2018 14:30
22-May-2018 06:00
22-May-2018 10:50
23-May-2018 11:00
23-May-2018 21:10
24-May-2018 08:00
25-May-2018 17:50
27-May-2018 08:00
28-May-2018 18:32
28-May-2018 21:10
29-May-2018 07:29
31-May-2018 13:20
31-May-2018 17:30

## 2018-06-09 NOTE — PROGRESS NOTE ADULT - SUBJECTIVE AND OBJECTIVE BOX
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 Admitted for: Lethargy and pain. Hospital course c/b acute-on-chronic anemia likely from underlying malignancy, with AF with RVR. Also with R foot pain, likely with PVD but patient did not want further studies. Also c/b hypotension, likely from multifactorial cause (GI bleed, decreased PO), with no plans for pressors based on goals. s/p zosyn for possible PNA. Transferred to the PCU for comfort care; primary disease sarcoma w/ mets.    Indications for PCU services: Pain management.     Interval Events: Nursing unable to obtain BP reading this morning. Actively dying     ADVANCE DIRECTIVES:    DNR [x ] YES [ ] NO                            [ ] Completed  MOLST  [ ] YES [x ] NO                      [ ] Completed  Health Care Proxy [x ] YES  [ ] NO   [ ] Completed  Living Will  [x ] YES [ ] NO             [ ] Surrogate  [x ] HCP  [ ] Guardian:     son   Reymundo Rivera                        Phone#:  ([phone])    Allergies  No Known Allergies    Intolerances    MEDICATIONS  (STANDING):  dextrose 5% + sodium chloride 0.45%. 1000 milliLiter(s) (30 mL/Hr) IV Continuous <Continuous>  levothyroxine Injectable 64 MICROGram(s) IV Push <User Schedule>  nystatin Powder 1 Application(s) Topical two times a day  pantoprazole  Injectable 40 milliGRAM(s) IV Push two times a day    MEDICATIONS  (PRN):  acetaminophen    Suspension 650 milliGRAM(s) Oral every 6 hours PRN Mild pain  ALBUTerol/ipratropium for Nebulization 3 milliLiter(s) Nebulizer every 6 hours PRN Wheezing  HYDROmorphone  Injectable 0.2 milliGRAM(s) IV Push every 2 hours PRN moderate-severe painHOLD for sedation, RR < 12  HYDROmorphone  Injectable 0.2 milliGRAM(s) IV Push every 2 hours PRN dyspnea  metoprolol tartrate Injectable 2.5 milliGRAM(s) IV Push every 6 hours PRN tachycardia  ondansetron Injectable 4 milliGRAM(s) IV Push every 8 hours PRN Nausea and/or Vomiting      PRESENT SYMPTOMS:  Source: [ ] Patient   [x ] Family   [x ] Team     Pain:                        [x] No [ ] Yes     Controlled        [ ] Mild [ ] Moderate [ ] Severe    Dyspnea:                [x No [ ] Yes             [ ] Mild [ ] Moderate [ ] Severe    Anxiety:                  [x ] No [ ] Yes             [ ] Mild [ ] Moderate [ ] Severe    Fatigue:                  [ ] No [x ] Yes             [ ] Mild [ ] Moderate [x ] Severe    Nausea:                  [x ] No [ ] Yes             [ ] Mild [ ] Moderate [ ] Severe    Loss of appetite:   [x ] No [ ] Yes             [ ] Mild [ ] Moderate [ ] Severe    Constipation:        [x ] No [ ] Yes             [ ] Mild [ ] Moderate [ ] Severe    Other Symptoms:  [ ] All other review of systems negative   [x ] Unable to obtain due to poor mentation     Karnofsky Performance Score/Palliative Performance Status Version 2:   10    %    PHYSICAL EXAM:  Vital Signs Last 24 Hrs  T(C): --  T(F): --  HR: 147 (08 Jun 2018 17:00) (147 - 147)  BP: 134/110 (08 Jun 2018 17:00) (134/110 - 134/110)  BP(mean): --  RR: 15 (09 Jun 2018 08:19) (15 - 15)  SpO2: --      General:  [ ] Alert  [ ] Oriented x      [x ] Lethargic  [ ] Agitated   [x ] Cachexia   [ ] Unarousable  [ ] Verbal  [x Non-Verbal    HEENT:  [ ] Normal   [x ] Dry mouth   [ ] ET Tube    [ ] Trach  [ ] Oral lesions    Lungs:   [x ] Clear [ ] Tachypnea  [ ] Audible excessive secretions   [ ] Rhonchi        [ ] Right [ ] Left [ ] Bilateral  [ ] Crackles        [ ] Right [ ] Left [ ] Bilateral  [ ] Wheezing     [ ] Right [ ] Left [ ] Bilateral  [ ] Respiratory failure [ ] Acute [ ] Chronic [ ] Hypoxemic [ ] Hypercarbic [ ] Other    Cardiovascular:   [x ] Regular [ ] Irregular [ ] Tachycardia   [ ] Bradycardia  [ ] Murmur [ ] Other  [ ] Shock [ ] Septic [ ] Hypovolemic [ ] Neurogenic [ ] Cardiogenic   [ ] Vasopressors [ ] Inotrophs    Abdomen:   [x ] Soft  [x ] Distended   [ ] +BS  [ ] Non tender [x ] Tender  [ ]PEG   [ ]OGT/ NGT   Last BM:   6/7  [ ] Other    Genitourinary:  [ ] Normal [x ] Incontinent   [ ] Oliguria/Anuria   [ ] Strange  [ ] Other       Musculoskeletal:    [ ] Normal   [x ] Weakness  [ ] Edema  [ x] Bedbound/Wheelchair bound [ ]  Ambulatory [ ] with [ ] without assistance [ x] Functional quadriplegia    Neurological: [ ] No focal deficits  [ ] Cognitive impairment  [ ] Dysphagia [ ] Dysarthria [ ] Paresis [ ] Other   [ ] Brain compression  [ ] Cerebral edema [x ] Encephalopathy    Skin: [ ] Normal   [ ] Ulcer(s) type [ ] Diabetic [ ] Pressure [ ] Other   Stage        POA [ ]  Yes [ ]  No       [ ] Rash  extremities cool and dusky    LABS: Reviewed    Imaging: Reviewed      Protein Calorie Malnutrition: [ ] Mild [ ] Moderate [x ] Severe    Oral Intake: [x ] Unable/mouth care only [ ] Minimal [ ] Moderate [ ] Full Capability  Diet: [ ] NPO [ ] Tube feeds [ ] TPN [ ] Other     RADIOLOGY & ADDITIONAL STUDIES:    REFERRALS:   [ ] Chaplaincy  [ ] Hospice Care  [ ] Child Life  [x ] Social Work  [ ] Case management [ ] Nutrition [ ] Holistic Therapy [ ] Wound Care [ ]Physical Therapy [ ] Other [ ]See goals of care note in [place]

## 2018-06-11 LAB
CULTURE RESULTS: SIGNIFICANT CHANGE UP
CULTURE RESULTS: SIGNIFICANT CHANGE UP
SPECIMEN SOURCE: SIGNIFICANT CHANGE UP
SPECIMEN SOURCE: SIGNIFICANT CHANGE UP

## 2021-06-29 NOTE — PROGRESS NOTE ADULT - ATTENDING COMMENTS
Labs ordered     Agree with assessment and plan as above by Dr. Boo. Reviewed all pertinent labs, glucose values, and imaging studies. Modifications made as indicated above.     Austen Cid D.O  [phone]

## 2021-11-10 NOTE — PROGRESS NOTE ADULT - SUBJECTIVE AND OBJECTIVE BOX
Geriatric and Palliative Care Unit Progress Note [ ] Hospice Progress Note [ ]     HPI:  History obtained via chart review and telephone phone call with the patient's son, Ralph Reymundo Rivera ().    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 Per the patient, the abdominal pain is "ok." Per the son, the patient has been complaining of abdominal pain for the last 2 weeks with diarrhea and nausea, and unable to tolerate PO. The patient receives all her care at [facility] in [facility]. Per the son, the patient has had multiple admissions at [facility]. The first admission was 3-4 months ago for SOB in the setting of pleural effusions, which were thought to be from possible pancreatic leak and inflammation. The patient was discharged to rehab, where she developed melena, and was found to have the duodenal mass, and was treated with protonix, radiation and Xarelto was discontinued. The patient's last EGD was 1-2 months ago, but the son states she did not have a colonoscopy at that time. The patient is currently denying and F C NVD CP SOB rash HA or change in neurological function or sensation. The patient is aware she is in the hospital and states that her pain is better. Per son, the patient is at her baseline. The son is investigating how to transfer the patient to [facility].    Vital Signs Last 24 Hrs  T(C): 35.9 (21 May 2018 00:08), Max: 36.7 (20 May 2018 16:50)  T(F): 96.7 (21 May 2018 00:08), Max: 98 (20 May 2018 16:50)  HR: 112 (20 May 2018 23:15) (106 - 144)  BP: 98/45 (20 May 2018 23:15) (69/56 - 98/45)  BP(mean): --  RR: 16 (20 May 2018 23:15) (15 - 22)  SpO2: 98% (20 May 2018 23:15) (96% - 100%)    In the ED: CT CAP performed. Being transfused 2 u pRBC. Vanc, Zosyn x1. 1L LR. (21 May 2018 01:47)    Indication for Palliative Care Unit Services:    ADVANCE DIRECTIVES:    DNR [x ] YES [ ] NO                            [ ] Completed  MOLST  [ ] YES [x ] NO                      [ ] Completed  Health Care Proxy [x ] YES  [ ] NO   [ ] Completed  Living Will  [x ] YES [ ] NO             [ ] Surrogate  [x ] HCP  [ ] Guardian:     son  Dr. Reymundo Rivera                        Phone#:  [phone])    Allergies  No Known Allergies    Intolerances        MEDICATIONS  (STANDING):  dextrose 5% + sodium chloride 0.45%. 1000 milliLiter(s) (30 mL/Hr) IV Continuous <Continuous>  HYDROmorphone  Injectable 0.2 milliGRAM(s) IV Push every 6 hours  levothyroxine Injectable 64 MICROGram(s) IV Push <User Schedule>  nystatin Powder 1 Application(s) Topical two times a day  pantoprazole  Injectable 40 milliGRAM(s) IV Push two times a day    MEDICATIONS  (PRN):  acetaminophen    Suspension 650 milliGRAM(s) Oral every 6 hours PRN Mild pain  ALBUTerol/ipratropium for Nebulization 3 milliLiter(s) Nebulizer every 6 hours PRN Wheezing  HYDROmorphone  Injectable 0.2 milliGRAM(s) IV Push every 2 hours PRN moderate-severe painHOLD for sedation, RR < 12  HYDROmorphone  Injectable 0.2 milliGRAM(s) IV Push every 2 hours PRN dyspnea  ondansetron Injectable 4 milliGRAM(s) IV Push every 8 hours PRN Nausea and/or Vomiting        PRESENT SYMPTOMS:  Source: [ ] Patient   [x ] Family   [x ] Team     Pain:                        [ ] No [x ] Yes             [ ] Mild [ ] Moderate [ ] Severe  unable to decscribe below.  ad  pain scale pt moaning and furrowing brow   Onset -  Location -  Duration -  Character -  Alleviating/Aggravating -  Radiation -  Timing -      Dyspnea:                [x No [ ] Yes             [ ] Mild [ ] Moderate [ ] Severe    Anxiety:                  [x ] No [ ] Yes             [ ] Mild [ ] Moderate [ ] Severe    Fatigue:                  [ ] No [x ] Yes             [ ] Mild [ ] Moderate [ ] Severe    Nausea:                  [x ] No [ ] Yes             [ ] Mild [ ] Moderate [ ] Severe    Loss of appetite:   [x ] No [ ] Yes             [ ] Mild [ ] Moderate [ ] Severe    Constipation:        [x ] No [ ] Yes             [ ] Mild [ ] Moderate [ ] Severe    Other Symptoms:  [ ] All other review of systems negative   [x ] Unable to obtain due to poor mentation     Karnofsky Performance Score/Palliative Performance Status Version 2:   20    %    PHYSICAL EXAM:  Vital Signs Last 24 Hrs  T(C): 35.4 (07 Jun 2018 10:50), Max: 35.4 (07 Jun 2018 10:50)  T(F): 95.7 (07 Jun 2018 10:50), Max: 95.7 (07 Jun 2018 10:50)  HR: 61 (08 Jun 2018 00:30) (61 - 105)  BP: 101/71 (08 Jun 2018 00:30) (101/71 - 112/78)  BP(mean): --  RR: 20 (08 Jun 2018 00:30) (20 - 22)  SpO2: 83% (07 Jun 2018 18:11) (83% - 100%) I&O's Summary    07 Jun 2018 07:01  -  08 Jun 2018 07:00  --------------------------------------------------------  IN: 340 mL / OUT: 60 mL / NET: 280 mL        General:  [ ] Alert  [ ] Oriented x      [x ] Lethargic  [ ] Agitated   [x ] Cachexia   [ ] Unarousable  [ ] Verbal  [x Non-Verbal    HEENT:  [ ] Normal   [x ] Dry mouth   [ ] ET Tube    [ ] Trach  [ ] Oral lesions    Lungs:   [x ] Clear [ ] Tachypnea  [ ] Audible excessive secretions   [ ] Rhonchi        [ ] Right [ ] Left [ ] Bilateral  [ ] Crackles        [ ] Right [ ] Left [ ] Bilateral  [ ] Wheezing     [ ] Right [ ] Left [ ] Bilateral  [ ] Respiratory failure [ ] Acute [ ] Chronic [ ] Hypoxemic [ ] Hypercarbic [ ] Other    Cardiovascular:   [x ] Regular [ ] Irregular [ ] Tachycardia   [ ] Bradycardia  [ ] Murmur [ ] Other  [ ] Shock [ ] Septic [ ] Hypovolemic [ ] Neurogenic [ ] Cardiogenic   [ ] Vasopressors [ ] Inotrophs    Abdomen:   [x ] Soft  [x ] Distended   [ ] +BS  [ ] Non tender [x ] Tender  [ ]PEG   [ ]OGT/ NGT   Last BM:   6/7  [ ] Other    Genitourinary:  [ ] Normal [x ] Incontinent   [ ] Oliguria/Anuria   [ ] Strange  [ ] Other       Musculoskeletal:    [ ] Normal   [x ] Weakness  [ ] Edema  [ x] Bedbound/Wheelchair bound [ ]  Ambulatory [ ] with [ ] without assistance [ x] Functional quadriplegia    Neurological: [ ] No focal deficits  [ ] Cognitive impairment  [ ] Dysphagia [ ] Dysarthria [ ] Paresis [ ] Other   [ ] Brain compression  [ ] Cerebral edema [x ] Encephalopathy    Skin: [ ] Normal   [ ] Ulcer(s) type [ ] Diabetic [ ] Pressure [ ] Other   Stage        POA [ ]  Yes [ ]  No       [ ] Rash  extremities cool and dusky    LABS:    06-06    145  |  115<H>  |  26<H>  ----------------------------<  77  4.5   |  19<L>  |  1.10    Ca    7.8<L>      06 Jun 2018 13:57            Protein Calorie Malnutrition: [ ] Mild [ ] Moderate [x ] Severe    Oral Intake: [x ] Unable/mouth care only [ ] Minimal [ ] Moderate [ ] Full Capability  Diet: [ ] NPO [ ] Tube feeds [ ] TPN [ ] Other     RADIOLOGY & ADDITIONAL STUDIES:    REFERRALS:   [ ] Chaplaincy  [ ] Hospice Care  [ ] Child Life  [x ] Social Work  [ ] Case management [ ] Nutrition [ ] Holistic Therapy [ ] Wound Care [ ]Physical Therapy [ ] Other [ ]See goals of care note in [place] cleansed/copious irrigation Geriatric and Palliative Care Unit Progress Note [ ] Hospice Progress Note [ ]     HPI:  History obtained via chart review and telephone phone call with the patient's son, Ralph Reymundo Rivera ().    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 Per the patient, the abdominal pain is "ok." Per the son, the patient has been complaining of abdominal pain for the last 2 weeks with diarrhea and nausea, and unable to tolerate PO. The patient receives all her care at [facility] in [facility]. Per the son, the patient has had multiple admissions at [facility]. The first admission was 3-4 months ago for SOB in the setting of pleural effusions, which were thought to be from possible pancreatic leak and inflammation. The patient was discharged to rehab, where she developed melena, and was found to have the duodenal mass, and was treated with protonix, radiation and Xarelto was discontinued. The patient's last EGD was 1-2 months ago, but the son states she did not have a colonoscopy at that time. The patient is currently denying and F C NVD CP SOB rash HA or change in neurological function or sensation. The patient is aware she is in the hospital and states that her pain is better. Per son, the patient is at her baseline. The son is investigating how to transfer the patient to [facility].    Vital Signs Last 24 Hrs  T(C): 35.9 (21 May 2018 00:08), Max: 36.7 (20 May 2018 16:50)  T(F): 96.7 (21 May 2018 00:08), Max: 98 (20 May 2018 16:50)  HR: 112 (20 May 2018 23:15) (106 - 144)  BP: 98/45 (20 May 2018 23:15) (69/56 - 98/45)  BP(mean): --  RR: 16 (20 May 2018 23:15) (15 - 22)  SpO2: 98% (20 May 2018 23:15) (96% - 100%)    In the ED: CT CAP performed. Being transfused 2 u pRBC. Vanc, Zosyn x1. 1L LR. (21 May 2018 01:47)    Indication for Palliative Care Unit Services:    ADVANCE DIRECTIVES:    DNR [x ] YES [ ] NO                            [ ] Completed  MOLST  [ ] YES [x ] NO                      [ ] Completed  Health Care Proxy [x ] YES  [ ] NO   [ ] Completed  Living Will  [x ] YES [ ] NO             [ ] Surrogate  [x ] HCP  [ ] Guardian:     son  Dr. Reymundo Rivera                        Phone#:  ([phone])    Allergies  No Known Allergies    Intolerances        MEDICATIONS  (STANDING):  dextrose 5% + sodium chloride 0.45%. 1000 milliLiter(s) (30 mL/Hr) IV Continuous <Continuous>  HYDROmorphone  Injectable 0.2 milliGRAM(s) IV Push every 6 hours  levothyroxine Injectable 64 MICROGram(s) IV Push <User Schedule>  nystatin Powder 1 Application(s) Topical two times a day  pantoprazole  Injectable 40 milliGRAM(s) IV Push two times a day    MEDICATIONS  (PRN):  acetaminophen    Suspension 650 milliGRAM(s) Oral every 6 hours PRN Mild pain  ALBUTerol/ipratropium for Nebulization 3 milliLiter(s) Nebulizer every 6 hours PRN Wheezing  HYDROmorphone  Injectable 0.2 milliGRAM(s) IV Push every 2 hours PRN moderate-severe painHOLD for sedation, RR < 12  HYDROmorphone  Injectable 0.2 milliGRAM(s) IV Push every 2 hours PRN dyspnea  ondansetron Injectable 4 milliGRAM(s) IV Push every 8 hours PRN Nausea and/or Vomiting        PRESENT SYMPTOMS:  Source: [ ] Patient   [x ] Family   [x ] Team     Pain:                        [ ] No [x ] Yes             [ ] Mild [ ] Moderate [ ] Severe  unable to decscribe below.  ad  pain scale pt moaning and furrowing brow   Onset -  Location -  Duration -  Character -  Alleviating/Aggravating -  Radiation -  Timing -      Dyspnea:                [x No [ ] Yes             [ ] Mild [ ] Moderate [ ] Severe    Anxiety:                  [x ] No [ ] Yes             [ ] Mild [ ] Moderate [ ] Severe    Fatigue:                  [ ] No [x ] Yes             [ ] Mild [ ] Moderate [x ] Severe    Nausea:                  [x ] No [ ] Yes             [ ] Mild [ ] Moderate [ ] Severe    Loss of appetite:   [x ] No [ ] Yes             [ ] Mild [ ] Moderate [ ] Severe    Constipation:        [x ] No [ ] Yes             [ ] Mild [ ] Moderate [ ] Severe    Other Symptoms:  [ ] All other review of systems negative   [x ] Unable to obtain due to poor mentation     Karnofsky Performance Score/Palliative Performance Status Version 2:   20    %    PHYSICAL EXAM:  Vital Signs Last 24 Hrs  T(C): 35.4 (07 Jun 2018 10:50), Max: 35.4 (07 Jun 2018 10:50)  T(F): 95.7 (07 Jun 2018 10:50), Max: 95.7 (07 Jun 2018 10:50)  HR: 61 (08 Jun 2018 00:30) (61 - 105)  BP: 101/71 (08 Jun 2018 00:30) (101/71 - 112/78)  BP(mean): --  RR: 20 (08 Jun 2018 00:30) (20 - 22)  SpO2: 83% (07 Jun 2018 18:11) (83% - 100%) I&O's Summary    07 Jun 2018 07:01  -  08 Jun 2018 07:00  --------------------------------------------------------  IN: 340 mL / OUT: 60 mL / NET: 280 mL        General:  [ ] Alert  [ ] Oriented x      [x ] Lethargic  [ ] Agitated   [x ] Cachexia   [ ] Unarousable  [ ] Verbal  [x Non-Verbal    HEENT:  [ ] Normal   [x ] Dry mouth   [ ] ET Tube    [ ] Trach  [ ] Oral lesions    Lungs:   [x ] Clear [ ] Tachypnea  [ ] Audible excessive secretions   [ ] Rhonchi        [ ] Right [ ] Left [ ] Bilateral  [ ] Crackles        [ ] Right [ ] Left [ ] Bilateral  [ ] Wheezing     [ ] Right [ ] Left [ ] Bilateral  [ ] Respiratory failure [ ] Acute [ ] Chronic [ ] Hypoxemic [ ] Hypercarbic [ ] Other    Cardiovascular:   [x ] Regular [ ] Irregular [ ] Tachycardia   [ ] Bradycardia  [ ] Murmur [ ] Other  [ ] Shock [ ] Septic [ ] Hypovolemic [ ] Neurogenic [ ] Cardiogenic   [ ] Vasopressors [ ] Inotrophs    Abdomen:   [x ] Soft  [x ] Distended   [ ] +BS  [ ] Non tender [x ] Tender  [ ]PEG   [ ]OGT/ NGT   Last BM:   6/7  [ ] Other    Genitourinary:  [ ] Normal [x ] Incontinent   [ ] Oliguria/Anuria   [ ] Strange  [ ] Other       Musculoskeletal:    [ ] Normal   [x ] Weakness  [ ] Edema  [ x] Bedbound/Wheelchair bound [ ]  Ambulatory [ ] with [ ] without assistance [ x] Functional quadriplegia    Neurological: [ ] No focal deficits  [ ] Cognitive impairment  [ ] Dysphagia [ ] Dysarthria [ ] Paresis [ ] Other   [ ] Brain compression  [ ] Cerebral edema [x ] Encephalopathy    Skin: [ ] Normal   [ ] Ulcer(s) type [ ] Diabetic [ ] Pressure [ ] Other   Stage        POA [ ]  Yes [ ]  No       [ ] Rash  extremities cool and dusky    LABS:    06-06    145  |  115<H>  |  26<H>  ----------------------------<  77  4.5   |  19<L>  |  1.10    Ca    7.8<L>      06 Jun 2018 13:57            Protein Calorie Malnutrition: [ ] Mild [ ] Moderate [x ] Severe    Oral Intake: [x ] Unable/mouth care only [ ] Minimal [ ] Moderate [ ] Full Capability  Diet: [ ] NPO [ ] Tube feeds [ ] TPN [ ] Other     RADIOLOGY & ADDITIONAL STUDIES:    REFERRALS:   [ ] Chaplaincy  [ ] Hospice Care  [ ] Child Life  [x ] Social Work  [ ] Case management [ ] Nutrition [ ] Holistic Therapy [ ] Wound Care [ ]Physical Therapy [ ] Other [ ]See goals of care note in [place]

## 2021-12-14 NOTE — PROGRESS NOTE ADULT - PROBLEM SELECTOR PLAN 9
Sheath #1: Sheath: inserted. Sheath inserted/placed in the right femoral  artery. Hemostasis achieved. 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 No current treatment this admission. Discussed care with pt's oncologist at [facility]. Possible RT but can be pursued as an outpatient. At this time no further treatment planned given poor performance status. Ongoing C discussions with HCP regarding dispo. She has been living with children prior to admission.  -patient is comfort measures  -Transfer to [facility] for palliative RT  -morphine 0.5mg IV q3 prn DVT: No pharmacologic 2/2 GIB, SCDs  Diet: mechanical soft  Dispo: Bedboard for PCU, comfort measures  Atrial fibrillation: d/c dig and lorpessor due to hypotension, no AC 2/2 GIB. Monitor off tele

## 2022-02-08 NOTE — PROGRESS NOTE ADULT - PROBLEM SELECTOR PLAN 10
[de-identified] : no palpable thyroid nodules [Midline] : located in midline position [Normal] : orientation to person, place, and time: normal [FreeTextEntry2] : right cheek and chin sites well  healed. no evidence of recurrent disease. no new lesions noted DVT: No pharmacologic 2/2 GIB, SCDs  Diet: mechanical soft  Dispo: Transfer to [facility] placed by HCP for palliative RT, comfort measures

## 2022-11-23 NOTE — PATIENT PROFILE ADULT. - CAREGIVER ADDRESS
Samples Given: Eucerin anti itch moisturizing cream Initiate Treatment: Eucrisa 2% ointment- Apply to itchy areas on legs once to twice daily x4-6 weeks with moisturizer on top until clear. Detail Level: Zone Render In Strict Bullet Format?: No Discontinue Regimen: Triamcinolone 0.1% cream Plan: Recommended Eucerin anti itch moisturizing cream daily Initiate Treatment: Desonide 0.05% ointment- Apply to affected areas of eyelids/around eyes BID x2 weeks with thick moisturizer on top, then discontinue. Discontinue Regimen: Eucrisa Not available at this time

## 2023-09-28 NOTE — H&P ADULT - PROBLEM SELECTOR PLAN 8
Please inform, that if he is having EGD due to dysphagia again, that is worsening, Dr Stein will be able to give the fastest relief/available.     He should follow up with me for a visit post EGD to discuss further.     We can involve Dr Hollis at a later date if needed.   Right now, standard EGD with possible dilation does not warrant transfer to Dr. Hollis ( who is booking out well into march).     THanks      Discussed with radha's son, Dr. Rivera at length. Patient wishes to be full code, as patient's son is getting  in August.
